# Patient Record
Sex: MALE | Race: BLACK OR AFRICAN AMERICAN | NOT HISPANIC OR LATINO
[De-identification: names, ages, dates, MRNs, and addresses within clinical notes are randomized per-mention and may not be internally consistent; named-entity substitution may affect disease eponyms.]

---

## 2017-04-08 ENCOUNTER — APPOINTMENT (OUTPATIENT)
Dept: MRI IMAGING | Facility: HOSPITAL | Age: 77
End: 2017-04-08

## 2017-04-11 ENCOUNTER — OUTPATIENT (OUTPATIENT)
Dept: OUTPATIENT SERVICES | Facility: HOSPITAL | Age: 77
LOS: 1 days | Discharge: HOME | End: 2017-04-11
Payer: MEDICARE

## 2017-04-11 ENCOUNTER — APPOINTMENT (OUTPATIENT)
Dept: MRI IMAGING | Facility: HOSPITAL | Age: 77
End: 2017-04-11

## 2017-04-11 DIAGNOSIS — H81.319 AURAL VERTIGO, UNSPECIFIED EAR: ICD-10-CM

## 2017-04-11 PROCEDURE — 70551 MRI BRAIN STEM W/O DYE: CPT | Mod: 26

## 2019-08-20 ENCOUNTER — INPATIENT (INPATIENT)
Facility: HOSPITAL | Age: 79
LOS: 1 days | Discharge: HOME | End: 2019-08-22
Attending: INTERNAL MEDICINE | Admitting: INTERNAL MEDICINE
Payer: MEDICARE

## 2019-08-20 VITALS
DIASTOLIC BLOOD PRESSURE: 75 MMHG | WEIGHT: 220.02 LBS | HEIGHT: 68 IN | HEART RATE: 89 BPM | TEMPERATURE: 99 F | RESPIRATION RATE: 18 BRPM | SYSTOLIC BLOOD PRESSURE: 150 MMHG | OXYGEN SATURATION: 96 %

## 2019-08-20 DIAGNOSIS — R42 DIZZINESS AND GIDDINESS: ICD-10-CM

## 2019-08-20 DIAGNOSIS — S22.42XA MULTIPLE FRACTURES OF RIBS, LEFT SIDE, INITIAL ENCOUNTER FOR CLOSED FRACTURE: ICD-10-CM

## 2019-08-20 DIAGNOSIS — E11.9 TYPE 2 DIABETES MELLITUS WITHOUT COMPLICATIONS: ICD-10-CM

## 2019-08-20 DIAGNOSIS — I10 ESSENTIAL (PRIMARY) HYPERTENSION: ICD-10-CM

## 2019-08-20 DIAGNOSIS — F10.10 ALCOHOL ABUSE, UNCOMPLICATED: ICD-10-CM
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
## 2019-08-20 DIAGNOSIS — Z29.9 ENCOUNTER FOR PROPHYLACTIC MEASURES, UNSPECIFIED: ICD-10-CM

## 2019-08-20 DIAGNOSIS — E78.00 PURE HYPERCHOLESTEROLEMIA, UNSPECIFIED: ICD-10-CM

## 2019-08-20 LAB
ALBUMIN SERPL ELPH-MCNC: 3.4 G/DL — SIGNIFICANT CHANGE UP (ref 3.3–5)
ALP SERPL-CCNC: 83 U/L — SIGNIFICANT CHANGE UP (ref 40–120)
ALT FLD-CCNC: 31 U/L — SIGNIFICANT CHANGE UP (ref 12–78)
ANION GAP SERPL CALC-SCNC: 6 MMOL/L — SIGNIFICANT CHANGE UP (ref 5–17)
AST SERPL-CCNC: 31 U/L — SIGNIFICANT CHANGE UP (ref 15–37)
BASOPHILS # BLD AUTO: 0.08 K/UL — SIGNIFICANT CHANGE UP (ref 0–0.2)
BASOPHILS NFR BLD AUTO: 1 % — SIGNIFICANT CHANGE UP (ref 0–2)
BILIRUB SERPL-MCNC: 0.4 MG/DL — SIGNIFICANT CHANGE UP (ref 0.2–1.2)
BUN SERPL-MCNC: 14 MG/DL — SIGNIFICANT CHANGE UP (ref 7–23)
CALCIUM SERPL-MCNC: 9.1 MG/DL — SIGNIFICANT CHANGE UP (ref 8.5–10.1)
CHLORIDE SERPL-SCNC: 103 MMOL/L — SIGNIFICANT CHANGE UP (ref 96–108)
CO2 SERPL-SCNC: 27 MMOL/L — SIGNIFICANT CHANGE UP (ref 22–31)
CREAT SERPL-MCNC: 1.06 MG/DL — SIGNIFICANT CHANGE UP (ref 0.5–1.3)
EOSINOPHIL # BLD AUTO: 0.31 K/UL — SIGNIFICANT CHANGE UP (ref 0–0.5)
EOSINOPHIL NFR BLD AUTO: 3.9 % — SIGNIFICANT CHANGE UP (ref 0–6)
ETHANOL SERPL-MCNC: <10 MG/DL — SIGNIFICANT CHANGE UP (ref 0–10)
GLUCOSE BLDC GLUCOMTR-MCNC: 218 MG/DL — HIGH (ref 70–99)
GLUCOSE SERPL-MCNC: 127 MG/DL — HIGH (ref 70–99)
HCT VFR BLD CALC: 34.4 % — LOW (ref 39–50)
HGB BLD-MCNC: 11.8 G/DL — LOW (ref 13–17)
IMM GRANULOCYTES NFR BLD AUTO: 0.2 % — SIGNIFICANT CHANGE UP (ref 0–1.5)
LYMPHOCYTES # BLD AUTO: 2.79 K/UL — SIGNIFICANT CHANGE UP (ref 1–3.3)
LYMPHOCYTES # BLD AUTO: 34.7 % — SIGNIFICANT CHANGE UP (ref 13–44)
MAGNESIUM SERPL-MCNC: 2.3 MG/DL — SIGNIFICANT CHANGE UP (ref 1.6–2.6)
MCHC RBC-ENTMCNC: 33.9 PG — SIGNIFICANT CHANGE UP (ref 27–34)
MCHC RBC-ENTMCNC: 34.3 GM/DL — SIGNIFICANT CHANGE UP (ref 32–36)
MCV RBC AUTO: 98.9 FL — SIGNIFICANT CHANGE UP (ref 80–100)
MONOCYTES # BLD AUTO: 0.63 K/UL — SIGNIFICANT CHANGE UP (ref 0–0.9)
MONOCYTES NFR BLD AUTO: 7.8 % — SIGNIFICANT CHANGE UP (ref 2–14)
NEUTROPHILS # BLD AUTO: 4.2 K/UL — SIGNIFICANT CHANGE UP (ref 1.8–7.4)
NEUTROPHILS NFR BLD AUTO: 52.4 % — SIGNIFICANT CHANGE UP (ref 43–77)
NRBC # BLD: 0 /100 WBCS — SIGNIFICANT CHANGE UP (ref 0–0)
PLATELET # BLD AUTO: 206 K/UL — SIGNIFICANT CHANGE UP (ref 150–400)
POTASSIUM SERPL-MCNC: 4 MMOL/L — SIGNIFICANT CHANGE UP (ref 3.5–5.3)
POTASSIUM SERPL-SCNC: 4 MMOL/L — SIGNIFICANT CHANGE UP (ref 3.5–5.3)
PROT SERPL-MCNC: 7.9 GM/DL — SIGNIFICANT CHANGE UP (ref 6–8.3)
RBC # BLD: 3.48 M/UL — LOW (ref 4.2–5.8)
RBC # FLD: 12.5 % — SIGNIFICANT CHANGE UP (ref 10.3–14.5)
SODIUM SERPL-SCNC: 136 MMOL/L — SIGNIFICANT CHANGE UP (ref 135–145)
WBC # BLD: 8.03 K/UL — SIGNIFICANT CHANGE UP (ref 3.8–10.5)
WBC # FLD AUTO: 8.03 K/UL — SIGNIFICANT CHANGE UP (ref 3.8–10.5)

## 2019-08-20 PROCEDURE — 99285 EMERGENCY DEPT VISIT HI MDM: CPT

## 2019-08-20 PROCEDURE — 72125 CT NECK SPINE W/O DYE: CPT | Mod: 26

## 2019-08-20 PROCEDURE — 93010 ELECTROCARDIOGRAM REPORT: CPT

## 2019-08-20 PROCEDURE — 76376 3D RENDER W/INTRP POSTPROCES: CPT | Mod: 26

## 2019-08-20 PROCEDURE — 76377 3D RENDER W/INTRP POSTPROCES: CPT | Mod: 26,76

## 2019-08-20 PROCEDURE — 74176 CT ABD & PELVIS W/O CONTRAST: CPT | Mod: 26

## 2019-08-20 PROCEDURE — 70486 CT MAXILLOFACIAL W/O DYE: CPT | Mod: 26

## 2019-08-20 PROCEDURE — 70450 CT HEAD/BRAIN W/O DYE: CPT | Mod: 26

## 2019-08-20 PROCEDURE — 99223 1ST HOSP IP/OBS HIGH 75: CPT | Mod: AI

## 2019-08-20 PROCEDURE — 71250 CT THORAX DX C-: CPT | Mod: 26

## 2019-08-20 RX ORDER — ENOXAPARIN SODIUM 100 MG/ML
40 INJECTION SUBCUTANEOUS DAILY
Refills: 0 | Status: DISCONTINUED | OUTPATIENT
Start: 2019-08-20 | End: 2019-08-22

## 2019-08-20 RX ORDER — LOSARTAN POTASSIUM 100 MG/1
100 TABLET, FILM COATED ORAL DAILY
Refills: 0 | Status: DISCONTINUED | OUTPATIENT
Start: 2019-08-20 | End: 2019-08-22

## 2019-08-20 RX ORDER — DEXTROSE 50 % IN WATER 50 %
15 SYRINGE (ML) INTRAVENOUS ONCE
Refills: 0 | Status: DISCONTINUED | OUTPATIENT
Start: 2019-08-20 | End: 2019-08-22

## 2019-08-20 RX ORDER — OXYCODONE AND ACETAMINOPHEN 5; 325 MG/1; MG/1
2 TABLET ORAL EVERY 6 HOURS
Refills: 0 | Status: DISCONTINUED | OUTPATIENT
Start: 2019-08-20 | End: 2019-08-20

## 2019-08-20 RX ORDER — DEXTROSE 50 % IN WATER 50 %
12.5 SYRINGE (ML) INTRAVENOUS ONCE
Refills: 0 | Status: DISCONTINUED | OUTPATIENT
Start: 2019-08-20 | End: 2019-08-22

## 2019-08-20 RX ORDER — INSULIN LISPRO 100/ML
VIAL (ML) SUBCUTANEOUS AT BEDTIME
Refills: 0 | Status: DISCONTINUED | OUTPATIENT
Start: 2019-08-20 | End: 2019-08-22

## 2019-08-20 RX ORDER — THIAMINE MONONITRATE (VIT B1) 100 MG
100 TABLET ORAL DAILY
Refills: 0 | Status: DISCONTINUED | OUTPATIENT
Start: 2019-08-20 | End: 2019-08-22

## 2019-08-20 RX ORDER — INSULIN LISPRO 100/ML
VIAL (ML) SUBCUTANEOUS
Refills: 0 | Status: DISCONTINUED | OUTPATIENT
Start: 2019-08-20 | End: 2019-08-22

## 2019-08-20 RX ORDER — DEXTROSE 50 % IN WATER 50 %
25 SYRINGE (ML) INTRAVENOUS ONCE
Refills: 0 | Status: DISCONTINUED | OUTPATIENT
Start: 2019-08-20 | End: 2019-08-22

## 2019-08-20 RX ORDER — HYDROCHLOROTHIAZIDE 25 MG
25 TABLET ORAL DAILY
Refills: 0 | Status: DISCONTINUED | OUTPATIENT
Start: 2019-08-20 | End: 2019-08-22

## 2019-08-20 RX ORDER — MECLIZINE HCL 12.5 MG
25 TABLET ORAL EVERY 8 HOURS
Refills: 0 | Status: DISCONTINUED | OUTPATIENT
Start: 2019-08-20 | End: 2019-08-22

## 2019-08-20 RX ORDER — GLUCAGON INJECTION, SOLUTION 0.5 MG/.1ML
1 INJECTION, SOLUTION SUBCUTANEOUS ONCE
Refills: 0 | Status: DISCONTINUED | OUTPATIENT
Start: 2019-08-20 | End: 2019-08-22

## 2019-08-20 RX ORDER — OXYCODONE AND ACETAMINOPHEN 5; 325 MG/1; MG/1
1 TABLET ORAL EVERY 6 HOURS
Refills: 0 | Status: DISCONTINUED | OUTPATIENT
Start: 2019-08-20 | End: 2019-08-22

## 2019-08-20 RX ORDER — FOLIC ACID 0.8 MG
1 TABLET ORAL DAILY
Refills: 0 | Status: DISCONTINUED | OUTPATIENT
Start: 2019-08-20 | End: 2019-08-22

## 2019-08-20 RX ORDER — OXYCODONE AND ACETAMINOPHEN 5; 325 MG/1; MG/1
1 TABLET ORAL ONCE
Refills: 0 | Status: DISCONTINUED | OUTPATIENT
Start: 2019-08-20 | End: 2019-08-20

## 2019-08-20 RX ORDER — AMLODIPINE BESYLATE 2.5 MG/1
5 TABLET ORAL DAILY
Refills: 0 | Status: DISCONTINUED | OUTPATIENT
Start: 2019-08-20 | End: 2019-08-22

## 2019-08-20 RX ORDER — SODIUM CHLORIDE 9 MG/ML
1000 INJECTION, SOLUTION INTRAVENOUS
Refills: 0 | Status: DISCONTINUED | OUTPATIENT
Start: 2019-08-20 | End: 2019-08-22

## 2019-08-20 RX ORDER — ATORVASTATIN CALCIUM 80 MG/1
10 TABLET, FILM COATED ORAL AT BEDTIME
Refills: 0 | Status: DISCONTINUED | OUTPATIENT
Start: 2019-08-20 | End: 2019-08-22

## 2019-08-20 RX ORDER — ACETAMINOPHEN 500 MG
650 TABLET ORAL EVERY 6 HOURS
Refills: 0 | Status: DISCONTINUED | OUTPATIENT
Start: 2019-08-20 | End: 2019-08-22

## 2019-08-20 RX ADMIN — OXYCODONE AND ACETAMINOPHEN 1 TABLET(S): 5; 325 TABLET ORAL at 15:34

## 2019-08-20 RX ADMIN — ATORVASTATIN CALCIUM 10 MILLIGRAM(S): 80 TABLET, FILM COATED ORAL at 22:04

## 2019-08-20 RX ADMIN — OXYCODONE AND ACETAMINOPHEN 1 TABLET(S): 5; 325 TABLET ORAL at 16:48

## 2019-08-20 RX ADMIN — Medication 25 MILLIGRAM(S): at 22:05

## 2019-08-20 NOTE — ED ADULT NURSE NOTE - NSIMPLEMENTINTERV_GEN_ALL_ED
Implemented All Fall Risk Interventions:  [place] to call system. Call bell, personal items and telephone within reach. Instruct patient to call for assistance. Room bathroom lighting operational. Non-slip footwear when patient is off stretcher. Physically safe environment: no spills, clutter or unnecessary equipment. Stretcher in lowest position, wheels locked, appropriate side rails in place. Provide visual cue, wrist band, yellow gown, etc. Monitor gait and stability. Monitor for mental status changes and reorient to person, place, and time. Review medications for side effects contributing to fall risk. Reinforce activity limits and safety measures with patient and family.

## 2019-08-20 NOTE — H&P ADULT - NSHPLABSRESULTS_GEN_ALL_CORE
11.8   8.03  )-----------( 206      ( 20 Aug 2019 17:09 )             34.4     08-20    136  |  103  |  14  ----------------------------<  127<H>  4.0   |  27  |  1.06    Ca    9.1      20 Aug 2019 17:09  Mg     2.3     08-20    TPro  7.9  /  Alb  3.4  /  TBili  0.4  /  DBili  x   /  AST  31  /  ALT  31  /  AlkPhos  83  08-20    CT Maxillofacial No Cont (08.20.19 @ 15:45)  IMPRESSION:  Facial bones intact without fracture.       CT Cervical Spine No Cont (08.20.19 @ 15:30)  IMPRESSION:  Degenerative changes without evidence of fracture    CT Abdomen and Pelvis No Cont (08.20.19 @ 15:29)  IMPRESSION:   Acute, minimally displaced fracture involving the first right   costosternal joint, extending into the lateral aspect of the manubrium.   There is also a nondisplaced fracture of the right anterior second rib.  There is a 3.5 cm hypodense lesion in the left kidney which cannot be   characterized as a simple cyst. Correlation with nonemergent renal   ultrasound is recommended.

## 2019-08-20 NOTE — ED ADULT NURSE NOTE - HOW OFTEN DO YOU HAVE SIX OR MORE DRINKS ON ONE OCCASION?
"Chief Complaint   Patient presents with     Breast Problem       Initial BP 96/58 (BP Location: Left arm, Patient Position: Sitting, Cuff Size: Adult Regular)   Pulse 68   Temp 97.6  F (36.4  C) (Tympanic)   Resp 20   Ht 1.6 m (5' 3\")   Wt 75.1 kg (165 lb 9.6 oz)   SpO2 96%   BMI 29.33 kg/m   Estimated body mass index is 29.33 kg/m  as calculated from the following:    Height as of this encounter: 1.6 m (5' 3\").    Weight as of this encounter: 75.1 kg (165 lb 9.6 oz).  Medication Reconciliation: complete    Zuleika Rossi LPN  "
Less than Monthly

## 2019-08-20 NOTE — ED ADULT TRIAGE NOTE - ESI TRIAGE ACUITY LEVEL, MLM
Last 5 Patient Entered Readings                                      Current 30 Day Average: 137/76     Recent Readings 5/20/2019 5/20/2019 5/13/2019 5/13/2019 5/12/2019    SBP (mmHg) 137 137 139 139 133    DBP (mmHg) 74 74 75 75 71    Pulse 79 79 79 79 81        Digital Medicine: Health  Follow Up    Lifestyle Modifications:    Dietary Modifications (Sodium intake <2,000mg/day, food labels, dining out):   Patient incorporates plenty of fresh fruits and vegetables into her diet and seldom eats fast foods.  Patient states that she probably eats too much sodium.  Reviewed AHA recommendations of low sodium diet and the range for what a low-sodium option is.    Physical Activity:   Patient states that she has a gym membership to [place] but with the lenten season and running her [employer]'s weekly fish koch it has limited her ability time wise to attend the gym regularly but expresses intentions to make it a part of her routine. I suggested that until she can get back on track there that we can set a goal for her to start walking 2-3x per week for at least 10 minutes at a time.   Patient agreed to walking goal.     Medication Therapy:   Patient has been compliant with the medication regimen.    Patient has the following medication side effects/concerns: None  (Frequency/Alleviating factors/Precipitating factors, etc.)     Follow up with Mrs. Parisa EARL [place] completed. No further questions or concerns. Will continue to follow up to achieve health goals.    Notes:  Patient agreed to goal of walking 2-3x per week for at least 10 minutes at a time.  Will f/u on exercise goal, diet (water intake) at next outreach.      
2

## 2019-08-20 NOTE — ED ADULT NURSE NOTE - OBJECTIVE STATEMENT
received er bed 16 c/o r orbital ecchymosis with pain at site c/o b/l clavicular pain and b/l rib area pain s/p missed a step on saturday and fell while outside in yard denies loc at time of incident hx dizziness x 2 years seen here in past and at ent no abnormal findings per pt denies anticoagulation

## 2019-08-20 NOTE — ED PROVIDER NOTE - CLINICAL SUMMARY MEDICAL DECISION MAKING FREE TEXT BOX
pt pw fall, sustained rib fractures. will admit for pain control for alcohol withdrawal as patient's wife believes him to be a daily etoh drinker.

## 2019-08-20 NOTE — ED PROVIDER NOTE - PHYSICAL EXAMINATION
Gen: Alert, NAD  Head: NC, AT   Eyes: PERRL, EOMI, normal lids/conjunctiva  ENT: normal hearing, patent oropharynx without erythema/exudate, uvula midline  Neck: supple, no tenderness, Trachea midline  Pulm: Bilateral BS, normal resp effort, no wheeze/stridor/retractions  CV: RRR, no M/R/G, 2+ radial and dp pulses bl, no edema  Abd: morbidly obese. soft, NT/ND, +BS, no hepatosplenomegaly  Mskel: extremities x4 with normal ROM and no joint effusions. no ctl spine ttp.   Skin: no rash, no bruising   Neuro: AAOx3, no sensory/motor deficits, CN 2-12 intact

## 2019-08-20 NOTE — H&P ADULT - NSHPPHYSICALEXAM_GEN_ALL_CORE
Physical Exam:   GENERAL: NAD, well-groomed, well-developed  HEAD:  Atraumatic, Normocephalic  EYES: +ecchymotic eye to left eye. EOMI, PERRLA, conjunctiva and sclera clear  ENMT: No tonsillar erythema, exudates, or enlargement; Moist mucous membranes, No lesions  NECK: Supple, No JVD  NERVOUS SYSTEM:  Alert & Oriented X3, Good concentration; Motor Strength 5/5 B/L upper and lower extremities, No arm/leg drift, good finger grasp  CHEST/LUNG: +TTP right 1st and 2nd rib. Clear to percussion bilaterally; No rales, rhonchi, wheezing, or rubs  HEART: Regular rate and rhythm; No murmurs  ABDOMEN: Soft, Nontender, Nondistended; Bowel sounds present  EXTREMITIES:  2+ Peripheral Pulses, No clubbing, cyanosis, or edema  LYMPH: No lymphadenopathy noted  SKIN: No rashes or lesions

## 2019-08-20 NOTE — PATIENT PROFILE ADULT - NSPROHMDIABETBLDGLCUSUAL_GEN_A_NUR
Nutrition Care Plan    Nutrition Diagnosis:   Overweight/obesity  related to poor food choices as evidenced by pt report of \"I'd really like to start eating better,\" BMI 49.31 kg/m^2  Altered nutrition-related laboratory values  related to diabetes milltus, type 2, uncontrolled as evidenced by fasting glucose 272-295 mg/dL, exogenous insulin dependency.     Intervention:  Modified diet:    Regular diet discontinued. RD ordered consistent carb liberal to manage blood glucose levels. Pt reports not following any diet at home however, is agreeable to beginning a path towards wellness and learning about carbohydrate counting.  RD assisted with menus x3.   Purpose of the nutrition education:   Discussed and provided [facility] handout \"Carbohydrates and Effects on blood glucose,\" stressing the importance of consistency in intake results in a consistent blood glucose level; 15 g of CHO = 1 exchange and how this translates to [facility] menu items, and specific foods that do and do not have impact on blood glucose levels. Pt verbalized inadequate knowledge before education and basic knowledge after education.      Monitoring and Evaluation:  Amount of food:   Goal - pt to consume >75% of 3 meals in accordance with diet ordered.. Will monitor intakes.    Glucose, fasting:    Glucose, casual:    Goal - fasting glucose <140, causal glucose <180 mg/dL      known

## 2019-08-20 NOTE — ED ADULT NURSE NOTE - CHIEF COMPLAINT QUOTE
as per pt " On Saturday afternoon I was walking down the stairs when I fell to the ground. I remember having couple alcoholic drinks but no recollection falling, and for the past couple days I have been dizzy, bruise to the right side of my face, hand, left shoulder and abdomen." hx htn, vertigo. dm, hdl.

## 2019-08-20 NOTE — ED ADULT TRIAGE NOTE - CHIEF COMPLAINT QUOTE
as per pt " On Saturday afternoon I was walking down the stairs when I fell to the ground. I have no recollection falling, and for the past couple days I have been dizzy, bruise to the right side of my face, hand, left shoulder and abdomen." hx htn, vertigo. dm, hdl. as per pt " On Saturday afternoon I was walking down the stairs when I fell to the ground. I remember having couple alcoholic drinks but no recollection falling, and for the past couple days I have been dizzy, bruise to the right side of my face, hand, left shoulder and abdomen." hx htn, vertigo. dm, hdl.

## 2019-08-20 NOTE — H&P ADULT - PROBLEM SELECTOR PLAN 2
closed fractures, first rib minimally displaced, 2nd rib nondisplaced.   -tylenol and percocet for pain control

## 2019-08-20 NOTE — H&P ADULT - HISTORY OF PRESENT ILLNESS
Patient is a 79 y/o male with PMHx DM, HTN, HLD, etoh abuse complaining of generalized pain 2/2 to fall on saturday. Patient admits to feeling dizzy for 2 years and has been taking meclizine. Patient states he had MRI, which was negative for acute findings. Patient take meclizine for dizziness, which has been helping at night. Patient admits to drinking prior to fall and states "drinking makes me dizzier". Patient believe fall was mechanical but cannot fully recall fall. +head trauma. +rib pain and abdominal pain. Denies N/V/D, fever, chills, chest pain, palpitations, sob, urinary/bowel changes.     In Er, labs unremarkable. CT head negative for acute changes/hemorrhage, CT chest and AP showing ribs fractures, renal cyst.     ER attending discussed patient with Dr. Christianson, who accepted patient for admission.

## 2019-08-20 NOTE — ED PROVIDER NOTE - OBJECTIVE STATEMENT
Pertinent PMH/PSH/FHx/SHx and Review of Systems contained within:  78m hx htn, dm pw and etoh abuser pw fall 3 days ago. patient notes that he was in the yard having some drinks when he tripped and fell onto his face and left side. he notes no loc but cannot recall parts of the fall. he has left abd and shoulder pain. he has no ha, vision loss, epistaxis, hematuria, cp, sob, numbness. nothing taken for symptoms.   Fh and Sh not otherwise contributory  ROS otherwise negative

## 2019-08-20 NOTE — SBIRT NOTE ADULT - NSSBIRTBRIEFINTDET_GEN_A_CORE
met with pt at bedside at reviewed AUDIT score. Pt was receptive to information and said that he wants to cut back after this fall; adding " I am scared". Pt commented that he abruptly quit cigarettes and believes that he can reduce this , too.

## 2019-08-20 NOTE — H&P ADULT - NSHPREVIEWOFSYSTEMS_GEN_ALL_CORE
REVIEW OF SYSTEMS:  CONSTITUTIONAL: No fever, weight loss, or fatigue  EYES: No eye pain, visual disturbances, or discharge  ENMT:  No difficulty hearing, tinnitus, vertigo; No sinus or throat pain  NECK: No pain or stiffness  BREASTS: No pain, masses, or nipple discharge  RESPIRATORY: No cough, wheezing, chills or hemoptysis; No shortness of breath  CARDIOVASCULAR: No chest pain, palpitations, dizziness, or leg swelling  GASTROINTESTINAL: +abdominal pain. No epigastric pain. No nausea, vomiting, or hematemesis; No diarrhea r constipation. No melena or hematochezia.  GENITOURINARY: No dysuria, No frequency, No hematuria, No incontinence  NEUROLOGICAL: No headaches, memory loss, loss of strength, numbness, or tremors  SKIN: No itching, burning, rashes, or lesions   LYMPH NODES: No enlarged glands  ENDOCRINE: No heat or cold intolerance; No hair loss  MUSCULOSKELETAL: +rib pain. No swelling; No muscle, back, or extremity pain  PSYCHIATRIC: No depression, anxiety, mood swings, or difficulty sleeping  HEME/LYMPH: No easy bruising, or bleeding gums  ALLERGY AND IMMUNOLOGIC: No hives or eczema

## 2019-08-20 NOTE — H&P ADULT - PROBLEM SELECTOR PLAN 4
CIWA protocol in placed, add taper if patient goes into withdrawal  -thiamine, folic acid, multivitamin

## 2019-08-21 DIAGNOSIS — N28.1 CYST OF KIDNEY, ACQUIRED: ICD-10-CM

## 2019-08-21 LAB
GLUCOSE BLDC GLUCOMTR-MCNC: 135 MG/DL — HIGH (ref 70–99)
GLUCOSE BLDC GLUCOMTR-MCNC: 159 MG/DL — HIGH (ref 70–99)
GLUCOSE BLDC GLUCOMTR-MCNC: 172 MG/DL — HIGH (ref 70–99)
GLUCOSE BLDC GLUCOMTR-MCNC: 198 MG/DL — HIGH (ref 70–99)
HBA1C BLD-MCNC: 7.3 % — HIGH (ref 4–5.6)

## 2019-08-21 PROCEDURE — 99233 SBSQ HOSP IP/OBS HIGH 50: CPT

## 2019-08-21 RX ADMIN — Medication 25 MILLIGRAM(S): at 05:46

## 2019-08-21 RX ADMIN — ENOXAPARIN SODIUM 40 MILLIGRAM(S): 100 INJECTION SUBCUTANEOUS at 11:23

## 2019-08-21 RX ADMIN — Medication 1 TABLET(S): at 11:22

## 2019-08-21 RX ADMIN — Medication 1 MILLIGRAM(S): at 11:22

## 2019-08-21 RX ADMIN — Medication 100 MILLIGRAM(S): at 11:22

## 2019-08-21 RX ADMIN — ATORVASTATIN CALCIUM 10 MILLIGRAM(S): 80 TABLET, FILM COATED ORAL at 21:53

## 2019-08-21 RX ADMIN — OXYCODONE AND ACETAMINOPHEN 1 TABLET(S): 5; 325 TABLET ORAL at 15:29

## 2019-08-21 RX ADMIN — AMLODIPINE BESYLATE 5 MILLIGRAM(S): 2.5 TABLET ORAL at 05:46

## 2019-08-21 RX ADMIN — LOSARTAN POTASSIUM 100 MILLIGRAM(S): 100 TABLET, FILM COATED ORAL at 05:45

## 2019-08-21 RX ADMIN — Medication 25 MILLIGRAM(S): at 15:26

## 2019-08-21 RX ADMIN — OXYCODONE AND ACETAMINOPHEN 1 TABLET(S): 5; 325 TABLET ORAL at 16:00

## 2019-08-21 RX ADMIN — Medication 25 MILLIGRAM(S): at 21:53

## 2019-08-21 NOTE — PROGRESS NOTE ADULT - SUBJECTIVE AND OBJECTIVE BOX
Patient is a 78y old  Male who presents with a chief complaint of fall, rib fractures (20 Aug 2019 19:30), has no SOB, complains rib pain.       OVERNIGHT EVENTS: none    MEDICATIONS  (STANDING):  amLODIPine   Tablet 5 milliGRAM(s) Oral daily  atorvastatin 10 milliGRAM(s) Oral at bedtime  dextrose 5%. 1000 milliLiter(s) (50 mL/Hr) IV Continuous <Continuous>  dextrose 50% Injectable 12.5 Gram(s) IV Push once  dextrose 50% Injectable 25 Gram(s) IV Push once  dextrose 50% Injectable 25 Gram(s) IV Push once  enoxaparin Injectable 40 milliGRAM(s) SubCutaneous daily  folic acid 1 milliGRAM(s) Oral daily  hydrochlorothiazide 25 milliGRAM(s) Oral daily  insulin lispro (HumaLOG) corrective regimen sliding scale   SubCutaneous three times a day before meals  insulin lispro (HumaLOG) corrective regimen sliding scale   SubCutaneous at bedtime  losartan 100 milliGRAM(s) Oral daily  meclizine 25 milliGRAM(s) Oral every 8 hours  multivitamin 1 Tablet(s) Oral daily  thiamine 100 milliGRAM(s) Oral daily    MEDICATIONS  (PRN):  acetaminophen   Tablet .. 650 milliGRAM(s) Oral every 6 hours PRN Mild Pain (1 - 3)  dextrose 40% Gel 15 Gram(s) Oral once PRN Blood Glucose LESS THAN 70 milliGRAM(s)/deciliter  glucagon  Injectable 1 milliGRAM(s) IntraMuscular once PRN Glucose LESS THAN 70 milligrams/deciliter  oxyCODONE    5 mG/acetaminophen 325 mG 1 Tablet(s) Oral every 6 hours PRN Moderate Pain (4 - 6)        REVIEW OF SYSTEMS:  CONSTITUTIONAL: No fever, weight loss, or fatigue  EYES: No eye pain, visual disturbances, or discharge  ENMT:  No difficulty hearing, tinnitus, vertigo; No sinus or throat pain  NECK: No pain or stiffness  RESPIRATORY: No cough, wheezing, chills or hemoptysis; No shortness of breath  CARDIOVASCULAR: No chest pain, palpitations, dizziness, or leg swelling  GASTROINTESTINAL: No abdominal or epigastric pain. No nausea, vomiting, or hematemesis;    GENITOURINARY: No dysuria, frequency, hematuria, or incontinence  NEUROLOGICAL: No headaches, memory loss, loss of strength, numbness, or tremors  SKIN: No itching, burning, rashes, or lesions      Vital Signs Last 24 Hrs  T(C): 36.9 (21 Aug 2019 12:01), Max: 37.1 (20 Aug 2019 20:20)  T(F): 98.5 (21 Aug 2019 12:01), Max: 98.8 (20 Aug 2019 20:20)  HR: 76 (21 Aug 2019 12:01) (64 - 76)  BP: 143/59 (21 Aug 2019 12:01) (119/71 - 155/62)  BP(mean): --  RR: 17 (21 Aug 2019 12:01) (17 - 18)  SpO2: 97% (21 Aug 2019 12:01) (95% - 99%)    PHYSICAL EXAM:  GENERAL: NAD, well-groomed, well-developed  HEAD:  Atraumatic, Normocephalic  EYES: EOMI, PERRLA, conjunctiva and sclera clear  ENMT: No tonsillar erythema, exudates, or enlargement; Moist mucous membranes   NECK: Supple, No JVD   NERVOUS SYSTEM:  Alert & Oriented X3, Good concentration; Motor Strength 5/5 B/L upper and lower extremities; DTRs 2+ intact and symmetric  CHEST/LUNG: Clear to auscultation bilaterally; No rales, rhonchi, wheezing, or rubs, some tenderness to bilateral lateral chest to palpation, or percussion.  HEART: Regular rate and rhythm; No murmurs, rubs, or gallops  ABDOMEN: Soft, Nontender, Nondistended; Bowel sounds present  EXTREMITIES:  2+ Peripheral Pulses, No clubbing, cyanosis, or edema  LYMPH: No lymphadenopathy noted  SKIN: No rashes or lesions    LABS:                        11.8   8.03  )-----------( 206      ( 20 Aug 2019 17:09 )             34.4     08-20    136  |  103  |  14  ----------------------------<  127<H>  4.0   |  27  |  1.06    Ca    9.1      20 Aug 2019 17:09  Mg     2.3     08-20    TPro  7.9  /  Alb  3.4  /  TBili  0.4  /  DBili  x   /  AST  31  /  ALT  31  /  AlkPhos  83  08-20       cardiac markers     CAPILLARY BLOOD GLUCOSE      POCT Blood Glucose.: 198 mg/dL (21 Aug 2019 11:07)  POCT Blood Glucose.: 159 mg/dL (21 Aug 2019 07:49)  POCT Blood Glucose.: 218 mg/dL (20 Aug 2019 22:03)    Cultures    RADIOLOGY & ADDITIONAL TESTS:    Imaging Personally Reviewed:  [ ] YES  [ ] NO    Consultant(s) Notes Reviewed:  [ ] YES  [ ] NO    Care Discussed with Consultants/Other Providers [ ] YES  [ ] NO

## 2019-08-22 ENCOUNTER — TRANSCRIPTION ENCOUNTER (OUTPATIENT)
Age: 79
End: 2019-08-22

## 2019-08-22 VITALS
SYSTOLIC BLOOD PRESSURE: 109 MMHG | OXYGEN SATURATION: 97 % | TEMPERATURE: 98 F | HEART RATE: 73 BPM | RESPIRATION RATE: 18 BRPM | DIASTOLIC BLOOD PRESSURE: 59 MMHG

## 2019-08-22 LAB
APPEARANCE UR: CLEAR — SIGNIFICANT CHANGE UP
BILIRUB UR-MCNC: NEGATIVE — SIGNIFICANT CHANGE UP
COLOR SPEC: YELLOW — SIGNIFICANT CHANGE UP
DIFF PNL FLD: NEGATIVE — SIGNIFICANT CHANGE UP
GLUCOSE BLDC GLUCOMTR-MCNC: 148 MG/DL — HIGH (ref 70–99)
GLUCOSE BLDC GLUCOMTR-MCNC: 156 MG/DL — HIGH (ref 70–99)
GLUCOSE BLDC GLUCOMTR-MCNC: 157 MG/DL — HIGH (ref 70–99)
GLUCOSE UR QL: NEGATIVE MG/DL — SIGNIFICANT CHANGE UP
KETONES UR-MCNC: NEGATIVE — SIGNIFICANT CHANGE UP
LEUKOCYTE ESTERASE UR-ACNC: NEGATIVE — SIGNIFICANT CHANGE UP
NITRITE UR-MCNC: NEGATIVE — SIGNIFICANT CHANGE UP
PH UR: 6 — SIGNIFICANT CHANGE UP (ref 5–8)
PROT UR-MCNC: NEGATIVE MG/DL — SIGNIFICANT CHANGE UP
SP GR SPEC: 1.01 — SIGNIFICANT CHANGE UP (ref 1.01–1.02)
UROBILINOGEN FLD QL: NEGATIVE MG/DL — SIGNIFICANT CHANGE UP

## 2019-08-22 PROCEDURE — 99239 HOSP IP/OBS DSCHRG MGMT >30: CPT

## 2019-08-22 RX ORDER — ATORVASTATIN CALCIUM 80 MG/1
1 TABLET, FILM COATED ORAL
Qty: 0 | Refills: 0 | DISCHARGE

## 2019-08-22 RX ORDER — FOLIC ACID 0.8 MG
1 TABLET ORAL
Qty: 30 | Refills: 0
Start: 2019-08-22 | End: 2019-09-20

## 2019-08-22 RX ORDER — AMLODIPINE BESYLATE 2.5 MG/1
1 TABLET ORAL
Qty: 0 | Refills: 0 | DISCHARGE

## 2019-08-22 RX ORDER — ATORVASTATIN CALCIUM 80 MG/1
1 TABLET, FILM COATED ORAL
Qty: 0 | Refills: 0 | DISCHARGE
Start: 2019-08-22

## 2019-08-22 RX ORDER — THIAMINE MONONITRATE (VIT B1) 100 MG
1 TABLET ORAL
Qty: 30 | Refills: 0
Start: 2019-08-22 | End: 2019-09-20

## 2019-08-22 RX ORDER — AMLODIPINE BESYLATE 2.5 MG/1
1 TABLET ORAL
Qty: 0 | Refills: 0 | DISCHARGE
Start: 2019-08-22

## 2019-08-22 RX ADMIN — LOSARTAN POTASSIUM 100 MILLIGRAM(S): 100 TABLET, FILM COATED ORAL at 06:13

## 2019-08-22 RX ADMIN — AMLODIPINE BESYLATE 5 MILLIGRAM(S): 2.5 TABLET ORAL at 06:13

## 2019-08-22 RX ADMIN — Medication 1 MILLIGRAM(S): at 11:26

## 2019-08-22 RX ADMIN — Medication 100 MILLIGRAM(S): at 11:25

## 2019-08-22 RX ADMIN — Medication 25 MILLIGRAM(S): at 06:13

## 2019-08-22 RX ADMIN — ENOXAPARIN SODIUM 40 MILLIGRAM(S): 100 INJECTION SUBCUTANEOUS at 11:25

## 2019-08-22 RX ADMIN — OXYCODONE AND ACETAMINOPHEN 1 TABLET(S): 5; 325 TABLET ORAL at 12:26

## 2019-08-22 RX ADMIN — Medication 1 TABLET(S): at 11:25

## 2019-08-22 RX ADMIN — OXYCODONE AND ACETAMINOPHEN 1 TABLET(S): 5; 325 TABLET ORAL at 11:26

## 2019-08-22 NOTE — PROGRESS NOTE ADULT - PROBLEM SELECTOR PLAN 4
-CIWA protocol in placed, add taper if patient goes into withdrawal  -thiamine, folic acid, multivitamin
-CIWA protocol in placed, add taper if patient goes into withdrawal  -thiamine, folic acid, multivitamin

## 2019-08-22 NOTE — PROGRESS NOTE ADULT - PROBLEM SELECTOR PLAN 2
-closed fractures, first rib minimally displaced, 2nd rib nondisplaced.   -pain meds as needed
-closed fractures, first rib minimally displaced, 2nd rib nondisplaced.   -pain meds as needed

## 2019-08-22 NOTE — DISCHARGE NOTE PROVIDER - HOSPITAL COURSE
77 y/o male with PMHx DM, HTN, HLD, etoh abuse, now admitted for fall (presumed mechanical fall 2/2 to alcoholic intake) , rib fractures              Problem/Plan - 1:    ·  Problem: Fall, initial encounter.  Plan: -Mechanical fall 2/2 to alcohol intake, fall precautions.          Problem/Plan - 2:    ·  Problem: Closed fracture of multiple ribs of left side, initial encounter.  Plan: -closed fractures, first rib minimally displaced, 2nd rib nondisplaced.     -pain meds as needed. Percocet sent to pharmacy.          Problem/Plan - 3:    ·  Problem: Vertigo.  Plan: - MRI in April 2017 negative for acute pathology    - continue meclizine 25mg q8hrs PRN    - PT eval noted, no need.          Problem/Plan - 4:    ·  Problem: Alcohol abuse.  Plan: -CIWA protocol in placed, add taper if patient goes into withdrawal    -thiamine, folic acid, multivitamin.          Problem/Plan - 5:    ·  Problem: High cholesterol.  Plan: continue atorvastatin.          Problem/Plan - 6:    Problem: Type 2 diabetes mellitus without complication, without long-term current use of insulin. Plan: AIC: 7.3. Dc with home mediations         Problem/Plan - 7:    ·  Problem: Essential hypertension.  Plan: - on Norvasc, Losartan, HCTZ.          Problem/Plan - 8:    ·  Problem: Renal cyst, left.  Plan: - cyst is 3.5 cm, needs renal sonogram as outpatient.     -outpatient renal u/s script given 79 y/o male with PMHx DM, HTN, HLD, etoh abuse, now admitted for fall (presumed mechanical fall 2/2 to alcoholic intake) , right second rib fracture              Problem/Plan - 1:    ·  Problem: Fall, initial encounter.  Plan: -Mechanical fall 2/2 to alcohol intake, fall precautions.          Problem/Plan - 2:    ·  Problem: Closed fracture of multiple ribs on the right side, initial encounter.  Plan: -closed fractures 2nd rib nondisplaced.     -pain meds as needed. Percocet sent to pharmacy.          Problem/Plan - 3:    ·  Problem: Vertigo.  Plan: - MRI in April 2017 negative for acute pathology    - continue meclizine 25mg q8hrs PRN    - PT eval noted, no need.          Problem/Plan - 4:    ·  Problem: Alcohol abuse.  Plan: -CIWA protocol in placed, add taper if patient goes into withdrawal    -thiamine, folic acid, multivitamin.          Problem/Plan - 5:    ·  Problem: High cholesterol.  Plan: continue atorvastatin.          Problem/Plan - 6:    Problem: Type 2 diabetes mellitus without complication, without long-term current use of insulin. Plan: AIC: 7.3. Dc with home mediations        Problem: Essential hypertension.  Plan: - on Norvasc, Losartan, HCTZ.          Problem/Plan - 8:    ·  Problem: Renal cyst, left.  Plan: - cyst is 3.5 cm, needs renal sonogram as outpatient. Script for sonogram given.         Note: Patient had acute right second rib fracture.

## 2019-08-22 NOTE — PROGRESS NOTE ADULT - ASSESSMENT
77 y/o male with PMHx DM, HTN, HLD, etoh abuse, now admitted for fall (presumed mechanical fall 2/2 to alcoholic intake) , rib fractures    IMPROVE VTE Individual Risk Assessment          RISK                                                          Points    [  ] Previous VTE                                                3    [  ] Thrombophilia                                             2    [  ] Lower limb paralysis                                   2        (unable to hold up >15 seconds)      [  ] Current Cancer                                             2         (within 6 months)    [  ] Immobilization > 24 hrs                              1    [  ] ICU/CCU stay > 24 hours                            1    [ x ] Age > 60                                                        1    IMPROVE VTE Score _____1____
77 y/o male with PMHx DM, HTN, HLD, etoh abuse, now admitted for fall (presumed mechanical fall 2/2 to alcoholic intake) , rib fractures    IMPROVE VTE Individual Risk Assessment          RISK                                                          Points    [  ] Previous VTE                                                3    [  ] Thrombophilia                                             2    [  ] Lower limb paralysis                                   2        (unable to hold up >15 seconds)      [  ] Current Cancer                                             2         (within 6 months)    [  ] Immobilization > 24 hrs                              1    [  ] ICU/CCU stay > 24 hours                            1    [ x ] Age > 60                                                        1    IMPROVE VTE Score _____1____

## 2019-08-22 NOTE — PROGRESS NOTE ADULT - PROBLEM SELECTOR PLAN 1
-Mechanical fall 2/2 to alcohol intake  -fall precautions
-Mechanical fall 2/2 to alcohol intake, fall precautions

## 2019-08-22 NOTE — DISCHARGE NOTE NURSING/CASE MANAGEMENT/SOCIAL WORK - NSDCDPATPORTLINK_GEN_ALL_CORE
You can access the [facility] Portal, offered by [facility], by registering with the following website: [email or web address]

## 2019-08-22 NOTE — PROGRESS NOTE ADULT - PROBLEM SELECTOR PLAN 3
- MRI in April 2017 negative for acute pathology  - continue meclizine 25mg q8hrs  - PT eval noted, no need.
- MRI in April 2017 negative for acute pathology  - continue meclizine 25mg q8hrs  - PT eval noted, no need.

## 2019-08-22 NOTE — DISCHARGE NOTE PROVIDER - NSDCFUSCHEDAPPT_GEN_ALL_CORE_FT
No VICENTE DAHL ; 08/29/2019 ; [facility]PP Ultrasnd  [address] Spine appears normal, range of motion is not limited, no muscle or joint tenderness

## 2019-08-22 NOTE — DISCHARGE NOTE PROVIDER - NSDCCPCAREPLAN_GEN_ALL_CORE_FT
PRINCIPAL DISCHARGE DIAGNOSIS  Diagnosis: Closed fracture of multiple ribs of left side, initial encounter  Assessment and Plan of Treatment: Physical therapy seen, no needs required. May discharge home. Pain management. Take percocet every 6 hours as needed for severe pain. Follow up with PCP within 2 weeks of discharge date. fall precautions.      SECONDARY DISCHARGE DIAGNOSES  Diagnosis: Renal cyst, left  Assessment and Plan of Treatment: script given to have bilateral alex ultrasound.    Diagnosis: Essential hypertension  Assessment and Plan of Treatment: continue home bp medications    Diagnosis: Alcohol abuse  Assessment and Plan of Treatment: encourage sobriety. thaimine, folic acid, multivitamin sent to pharmacy    Diagnosis: Vertigo  Assessment and Plan of Treatment: continue meclizine 25mg every 8 hours as needed for dizziness. STOP DRINKING BECAUSE IT MAKES YOU DIZZIER.    Diagnosis: High cholesterol  Assessment and Plan of Treatment: continue home cholesterol medication

## 2019-08-22 NOTE — DISCHARGE NOTE NURSING/CASE MANAGEMENT/SOCIAL WORK - NSDCPEWEB_GEN_ALL_CORE
[place] website --- [email or web address] Tobacco [email or web address] website --- [email or web address]

## 2019-08-22 NOTE — PROGRESS NOTE ADULT - SUBJECTIVE AND OBJECTIVE BOX
INTERVAL HPI/OVERNIGHT EVENTS:  Pt seen and examined at bedside.     Allergies/Intolerance: No Known Allergies      MEDICATIONS  (STANDING):  amLODIPine   Tablet 5 milliGRAM(s) Oral daily  atorvastatin 10 milliGRAM(s) Oral at bedtime  dextrose 5%. 1000 milliLiter(s) (50 mL/Hr) IV Continuous <Continuous>  dextrose 50% Injectable 12.5 Gram(s) IV Push once  dextrose 50% Injectable 25 Gram(s) IV Push once  dextrose 50% Injectable 25 Gram(s) IV Push once  enoxaparin Injectable 40 milliGRAM(s) SubCutaneous daily  folic acid 1 milliGRAM(s) Oral daily  hydrochlorothiazide 25 milliGRAM(s) Oral daily  insulin lispro (HumaLOG) corrective regimen sliding scale   SubCutaneous three times a day before meals  insulin lispro (HumaLOG) corrective regimen sliding scale   SubCutaneous at bedtime  losartan 100 milliGRAM(s) Oral daily  meclizine 25 milliGRAM(s) Oral every 8 hours  multivitamin 1 Tablet(s) Oral daily  thiamine 100 milliGRAM(s) Oral daily    MEDICATIONS  (PRN):  acetaminophen   Tablet .. 650 milliGRAM(s) Oral every 6 hours PRN Mild Pain (1 - 3)  dextrose 40% Gel 15 Gram(s) Oral once PRN Blood Glucose LESS THAN 70 milliGRAM(s)/deciliter  glucagon  Injectable 1 milliGRAM(s) IntraMuscular once PRN Glucose LESS THAN 70 milligrams/deciliter  oxyCODONE    5 mG/acetaminophen 325 mG 1 Tablet(s) Oral every 6 hours PRN Moderate Pain (4 - 6)        ROS: all systems reviewed and wnl      PHYSICAL EXAMINATION:  Vital Signs Last 24 Hrs  T(C): 36.5 (22 Aug 2019 05:54), Max: 36.9 (21 Aug 2019 12:01)  T(F): 97.7 (22 Aug 2019 05:54), Max: 98.5 (21 Aug 2019 12:01)  HR: 69 (22 Aug 2019 06:09) (63 - 77)  BP: 136/67 (22 Aug 2019 06:09) (119/63 - 143/59)  BP(mean): --  RR: 18 (22 Aug 2019 05:54) (17 - 18)  SpO2: 97% (22 Aug 2019 05:54) (95% - 98%)  CAPILLARY BLOOD GLUCOSE      POCT Blood Glucose.: 148 mg/dL (22 Aug 2019 07:53)  POCT Blood Glucose.: 172 mg/dL (21 Aug 2019 21:52)  POCT Blood Glucose.: 135 mg/dL (21 Aug 2019 15:54)  POCT Blood Glucose.: 198 mg/dL (21 Aug 2019 11:07)      08-22 @ 07:01  -  08-22 @ 10:51  --------------------------------------------------------  IN: 240 mL / OUT: 0 mL / NET: 240 mL        GENERAL:   NECK: supple, No JVD  CHEST/LUNG: clear to auscultation bilaterally; no rales, rhonchi, or wheezing b/l  HEART: normal S1, S2  ABDOMEN: BS+, soft, ND, NT   EXTREMITIES:  pulses palpable; no clubbing, cyanosis, or edema b/l LEs  SKIN: no rashes or lesions      LABS:                        11.8   8.03  )-----------( 206      ( 20 Aug 2019 17:09 )             34.4     08-20    136  |  103  |  14  ----------------------------<  127<H>  4.0   |  27  |  1.06    Ca    9.1      20 Aug 2019 17:09  Mg     2.3     08-20    TPro  7.9  /  Alb  3.4  /  TBili  0.4  /  DBili  x   /  AST  31  /  ALT  31  /  AlkPhos  83  08-20

## 2019-08-22 NOTE — DISCHARGE NOTE NURSING/CASE MANAGEMENT/SOCIAL WORK - NSDCPEEMAIL_GEN_ALL_CORE
[facility] email [email or web address]

## 2019-08-29 ENCOUNTER — APPOINTMENT (OUTPATIENT)
Dept: ULTRASOUND IMAGING | Facility: HOSPITAL | Age: 79
End: 2019-08-29

## 2019-08-29 ENCOUNTER — OUTPATIENT (OUTPATIENT)
Dept: OUTPATIENT SERVICES | Facility: HOSPITAL | Age: 79
LOS: 1 days | Discharge: HOME | End: 2019-08-29
Payer: MEDICARE

## 2019-08-29 DIAGNOSIS — N28.1 CYST OF KIDNEY, ACQUIRED: ICD-10-CM

## 2019-08-29 DIAGNOSIS — N23 UNSPECIFIED RENAL COLIC: ICD-10-CM

## 2019-08-29 DIAGNOSIS — R42 DIZZINESS AND GIDDINESS: ICD-10-CM

## 2019-08-29 DIAGNOSIS — E11.9 TYPE 2 DIABETES MELLITUS WITHOUT COMPLICATIONS: ICD-10-CM
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
## 2019-08-29 DIAGNOSIS — I10 ESSENTIAL (PRIMARY) HYPERTENSION: ICD-10-CM

## 2019-08-29 DIAGNOSIS — F10.10 ALCOHOL ABUSE, UNCOMPLICATED: ICD-10-CM

## 2019-08-29 DIAGNOSIS — E78.5 HYPERLIPIDEMIA, UNSPECIFIED: ICD-10-CM

## 2019-08-29 DIAGNOSIS — Z79.84 LONG TERM (CURRENT) USE OF ORAL HYPOGLYCEMIC DRUGS: ICD-10-CM

## 2019-08-29 DIAGNOSIS — S22.41XA MULTIPLE FRACTURES OF RIBS, RIGHT SIDE, INITIAL ENCOUNTER FOR CLOSED FRACTURE: ICD-10-CM
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
## 2019-08-29 PROCEDURE — 76775 US EXAM ABDO BACK WALL LIM: CPT | Mod: 26

## 2021-03-08 NOTE — ED ADULT NURSE NOTE - NSSEPSISNEWALTERMENTAL_ED_A_ED
No
17M p/w palpitations x 3 days. Hx of anxiety. Vitals reviewed to be wnl. Physical-nad,perrl,mmm,rrr,ctab,abd softntnd,fromx4,anox3. ekg- nl. ED CXR reviewed by me which did not reveal a ptx, infiltrate, or effusion. Pt is well appearing, no emergent interventions, will fu with pmd. dc home

## 2022-01-01 NOTE — ED ADULT NURSE NOTE - CAS TRG GENERAL AIRWAY, MLM
Received new patient referral to Pediatric Cardiology for Trisomy 21, Down syndrome . Patient is scheduled 5/5. Sent new patient letter via email.    Parents are aware, procedures/surgeries are completed in [place].    Does the patient have [employer] coverage? No    If yes, parent is aware any appointments or procedures scheduled in [place] may not be authorized and they may have to be seen at [facility] in [place] as they are in [facility].    
Patent

## 2022-09-09 NOTE — ED ADULT NURSE NOTE - TEMPLATE
-- DO NOT REPLY / DO NOT REPLY ALL --  -- Message is from Engagement Center Operations (ECO) --    General Patient Message Please call patient 9/9/2022. She is requesting if her procedure scheduled 9/23/2022 can be done earlier.     Caller Information       Type Contact Phone/Fax    09/08/2022 09:14 PM CDT Phone (Incoming) Sheryl Guo (Self) [phone] (M)        Alternative phone number: [phone]    Can a detailed message be left? Yes    Message Turnaround:     Is it Working Hours? No - After Hours/Weekend/Holiday     Did the caller agree that this message can wait until the office reopens in the morning? YES - The Message Can Wait - Send a message to the provider's clinical support pool     [place]:    Please give this turnaround time to the caller:   \"This message will be sent to [state Provider's name]. The clinical team will fulfill your request as soon as they review your message.\"                
-- DO NOT REPLY / DO NOT REPLY ALL --  -- Message is from [facility] Center [employer] (ECO) --    Provider paged via [employer] Documentation - The below message was copied and pasted from a [employer] page:      8/19/2022 3:04:16 PM  [facility] Contact Center  ACC NURSE  Umer Rodriguez MD  Secure Text  [phone] PATIENT NUMBER -------------------------------- ACC NURSE LINE (IF QUESTIONS ONLY - [phone]) URGENT FROM: [facility] REQUESTED DR:UMER RODRIGUEZ DR. DR. UMER RODRIGUEZ DECLINED ED DISPOSITION FOR INTERMITTENT GASPING FOR AIR WHILE WALKING , CHEST DISCOMFORT IN BETWEEN BREAST . PLEASE CALL TO ADVISED. [phone]. ANTU          Route encounter to 'Provider On-Call' clinical support pool that was paged. 'Sign and Close Workspace'  
Onset: since 5/8/2022  Location / description: rectal bleeding and left sided abdominal pain  Precipitating Factors: history of rectal bleeding, HTN, diabettes  Pain Scale (1-10), 10 highest: 3-4/10  Associated Symptoms: moderate bleeding and patient notices her medication in stool, frequent thirst, occasional lightheadedness,  What improves / worsens symptoms: none  Symptom specific medications: none  LMP : No LMP recorded (lmp unknown). Patient has had a hysterectomy.  Are you pregnant or breast feeding: NA  Recent visits (last 3-4 weeks) for same reason or recent surgery: 8/23/2022    PLAN:   Directed to Emergency Department    Patient/Caller refuses to follow recommendations.    Reason for Disposition  • [1] MODERATE rectal bleeding (small blood clots, passing blood without stool, or toilet water turns red) AND [2] more than once a day    Protocols used: RECTAL BLEEDING-A-  Patient has GI procedure scheduled 9/23/2022. Message sent to Dr. Mars's office (GI provider) per patient request for earlier date if possible.     
Patient having a lot of anxiety related symptoms, will evaluate on August 23  
Regarding: [place] rectal bleeding abdominal pain on left   ----- Message from Cassi Bill sent at 9/8/2022  9:18 PM CDT -----  ..Patient Name: Sheryl Guo    Specialist or PCP Name: Umer Rodriguez     Symptoms: rectal bleeding with abdominal pain on left side     Pregnant (females aged 13-60. If Yes, how long?) : n/a     Call Back # : [phone]    Which State are you currently located in?: [place]     Name of Clinic Site / [facility]     Use following scripting for patients waiting for a callback:   “Nurse callback times vary based on call volumes; please be aware the return phone call may come from an unidentified phone number. If your symptoms worsen or become life threatening while waiting, you should seek immediate assistance by calling 911 or going to the ER for evaluation.”   
Spoke to pt's daughter jayla brownx rescheduled to 09/16 from 09/23. Covid appt 09/13. Radha verbalized all understanding.  
Fall

## 2022-09-30 ENCOUNTER — INPATIENT (INPATIENT)
Facility: HOSPITAL | Age: 82
LOS: 4 days | Discharge: NURSING, REHAB OR LONG-TERM CARE | End: 2022-10-05
Attending: HOSPITALIST | Admitting: HOSPITALIST

## 2022-09-30 VITALS
SYSTOLIC BLOOD PRESSURE: 177 MMHG | RESPIRATION RATE: 17 BRPM | DIASTOLIC BLOOD PRESSURE: 84 MMHG | HEIGHT: 68 IN | TEMPERATURE: 99 F | WEIGHT: 229.94 LBS | OXYGEN SATURATION: 97 % | HEART RATE: 93 BPM

## 2022-09-30 LAB
ALBUMIN SERPL ELPH-MCNC: 3.5 G/DL — SIGNIFICANT CHANGE UP (ref 3.3–5)
ALP SERPL-CCNC: 84 U/L — SIGNIFICANT CHANGE UP (ref 40–120)
ALT FLD-CCNC: 16 U/L — SIGNIFICANT CHANGE UP (ref 12–78)
AMMONIA BLD-MCNC: <10 UMOL/L — LOW (ref 11–32)
ANION GAP SERPL CALC-SCNC: 10 MMOL/L — SIGNIFICANT CHANGE UP (ref 5–17)
APTT BLD: 28.2 SEC — SIGNIFICANT CHANGE UP (ref 27.5–35.5)
AST SERPL-CCNC: 11 U/L — LOW (ref 15–37)
BASOPHILS # BLD AUTO: 0.04 K/UL — SIGNIFICANT CHANGE UP (ref 0–0.2)
BASOPHILS NFR BLD AUTO: 0.5 % — SIGNIFICANT CHANGE UP (ref 0–2)
BILIRUB SERPL-MCNC: 0.8 MG/DL — SIGNIFICANT CHANGE UP (ref 0.2–1.2)
BLD GP AB SCN SERPL QL: SIGNIFICANT CHANGE UP
BUN SERPL-MCNC: 7 MG/DL — SIGNIFICANT CHANGE UP (ref 7–23)
CALCIUM SERPL-MCNC: 9.6 MG/DL — SIGNIFICANT CHANGE UP (ref 8.5–10.1)
CHLORIDE SERPL-SCNC: 102 MMOL/L — SIGNIFICANT CHANGE UP (ref 96–108)
CO2 SERPL-SCNC: 26 MMOL/L — SIGNIFICANT CHANGE UP (ref 22–31)
CREAT SERPL-MCNC: 1.07 MG/DL — SIGNIFICANT CHANGE UP (ref 0.5–1.3)
EGFR: 70 ML/MIN/1.73M2 — SIGNIFICANT CHANGE UP
EOSINOPHIL # BLD AUTO: 0 K/UL — SIGNIFICANT CHANGE UP (ref 0–0.5)
EOSINOPHIL NFR BLD AUTO: 0 % — SIGNIFICANT CHANGE UP (ref 0–6)
FLUAV AG NPH QL: SIGNIFICANT CHANGE UP
FLUBV AG NPH QL: SIGNIFICANT CHANGE UP
GLUCOSE BLDC GLUCOMTR-MCNC: 180 MG/DL — HIGH (ref 70–99)
GLUCOSE SERPL-MCNC: 176 MG/DL — HIGH (ref 70–99)
HCT VFR BLD CALC: 41.7 % — SIGNIFICANT CHANGE UP (ref 39–50)
HGB BLD-MCNC: 14.5 G/DL — SIGNIFICANT CHANGE UP (ref 13–17)
IMM GRANULOCYTES NFR BLD AUTO: 0.2 % — SIGNIFICANT CHANGE UP (ref 0–0.9)
INR BLD: 1.03 RATIO — SIGNIFICANT CHANGE UP (ref 0.88–1.16)
LACTATE SERPL-SCNC: 2.6 MMOL/L — HIGH (ref 0.7–2)
LYMPHOCYTES # BLD AUTO: 1.06 K/UL — SIGNIFICANT CHANGE UP (ref 1–3.3)
LYMPHOCYTES # BLD AUTO: 12.6 % — LOW (ref 13–44)
MAGNESIUM SERPL-MCNC: 1.5 MG/DL — LOW (ref 1.6–2.6)
MCHC RBC-ENTMCNC: 34 PG — SIGNIFICANT CHANGE UP (ref 27–34)
MCHC RBC-ENTMCNC: 34.8 G/DL — SIGNIFICANT CHANGE UP (ref 32–36)
MCV RBC AUTO: 97.7 FL — SIGNIFICANT CHANGE UP (ref 80–100)
MONOCYTES # BLD AUTO: 0.22 K/UL — SIGNIFICANT CHANGE UP (ref 0–0.9)
MONOCYTES NFR BLD AUTO: 2.6 % — SIGNIFICANT CHANGE UP (ref 2–14)
NEUTROPHILS # BLD AUTO: 7.07 K/UL — SIGNIFICANT CHANGE UP (ref 1.8–7.4)
NEUTROPHILS NFR BLD AUTO: 84.1 % — HIGH (ref 43–77)
NRBC # BLD: 0 /100 WBCS — SIGNIFICANT CHANGE UP (ref 0–0)
PLATELET # BLD AUTO: 234 K/UL — SIGNIFICANT CHANGE UP (ref 150–400)
POTASSIUM SERPL-MCNC: 3.8 MMOL/L — SIGNIFICANT CHANGE UP (ref 3.5–5.3)
POTASSIUM SERPL-SCNC: 3.8 MMOL/L — SIGNIFICANT CHANGE UP (ref 3.5–5.3)
PROT SERPL-MCNC: 8.3 GM/DL — SIGNIFICANT CHANGE UP (ref 6–8.3)
PROTHROM AB SERPL-ACNC: 12.3 SEC — SIGNIFICANT CHANGE UP (ref 10.5–13.4)
RBC # BLD: 4.27 M/UL — SIGNIFICANT CHANGE UP (ref 4.2–5.8)
RBC # FLD: 13.2 % — SIGNIFICANT CHANGE UP (ref 10.3–14.5)
SARS-COV-2 RNA SPEC QL NAA+PROBE: SIGNIFICANT CHANGE UP
SODIUM SERPL-SCNC: 138 MMOL/L — SIGNIFICANT CHANGE UP (ref 135–145)
WBC # BLD: 8.41 K/UL — SIGNIFICANT CHANGE UP (ref 3.8–10.5)
WBC # FLD AUTO: 8.41 K/UL — SIGNIFICANT CHANGE UP (ref 3.8–10.5)

## 2022-09-30 PROCEDURE — 74177 CT ABD & PELVIS W/CONTRAST: CPT | Mod: 26,MA

## 2022-09-30 PROCEDURE — 99291 CRITICAL CARE FIRST HOUR: CPT

## 2022-09-30 RX ORDER — IBUPROFEN 200 MG
600 TABLET ORAL ONCE
Refills: 0 | Status: COMPLETED | OUTPATIENT
Start: 2022-09-30 | End: 2022-09-30

## 2022-09-30 RX ORDER — ONDANSETRON 8 MG/1
8 TABLET, FILM COATED ORAL ONCE
Refills: 0 | Status: COMPLETED | OUTPATIENT
Start: 2022-09-30 | End: 2022-09-30

## 2022-09-30 RX ORDER — PHENOBARBITAL 60 MG
260 TABLET ORAL ONCE
Refills: 0 | Status: DISCONTINUED | OUTPATIENT
Start: 2022-09-30 | End: 2022-09-30

## 2022-09-30 RX ORDER — THIAMINE MONONITRATE (VIT B1) 100 MG
500 TABLET ORAL ONCE
Refills: 0 | Status: COMPLETED | OUTPATIENT
Start: 2022-09-30 | End: 2022-09-30

## 2022-09-30 RX ORDER — SODIUM CHLORIDE 9 MG/ML
1000 INJECTION INTRAMUSCULAR; INTRAVENOUS; SUBCUTANEOUS ONCE
Refills: 0 | Status: COMPLETED | OUTPATIENT
Start: 2022-09-30 | End: 2022-09-30

## 2022-09-30 RX ORDER — ACETAMINOPHEN 500 MG
975 TABLET ORAL ONCE
Refills: 0 | Status: COMPLETED | OUTPATIENT
Start: 2022-09-30 | End: 2022-09-30

## 2022-09-30 RX ORDER — PIPERACILLIN AND TAZOBACTAM 4; .5 G/20ML; G/20ML
3.38 INJECTION, POWDER, LYOPHILIZED, FOR SOLUTION INTRAVENOUS ONCE
Refills: 0 | Status: COMPLETED | OUTPATIENT
Start: 2022-09-30 | End: 2022-09-30

## 2022-09-30 RX ADMIN — PIPERACILLIN AND TAZOBACTAM 200 GRAM(S): 4; .5 INJECTION, POWDER, LYOPHILIZED, FOR SOLUTION INTRAVENOUS at 22:25

## 2022-09-30 RX ADMIN — ONDANSETRON 8 MILLIGRAM(S): 8 TABLET, FILM COATED ORAL at 20:33

## 2022-09-30 RX ADMIN — Medication 975 MILLIGRAM(S): at 23:51

## 2022-09-30 RX ADMIN — Medication 260 MILLIGRAM(S): at 22:25

## 2022-09-30 RX ADMIN — Medication 2 MILLIGRAM(S): at 22:25

## 2022-09-30 RX ADMIN — SODIUM CHLORIDE 1000 MILLILITER(S): 9 INJECTION INTRAMUSCULAR; INTRAVENOUS; SUBCUTANEOUS at 22:40

## 2022-09-30 RX ADMIN — SODIUM CHLORIDE 1000 MILLILITER(S): 9 INJECTION INTRAMUSCULAR; INTRAVENOUS; SUBCUTANEOUS at 20:33

## 2022-09-30 NOTE — ED ADULT TRIAGE NOTE - CHIEF COMPLAINT QUOTE
As per EMS pt's family reports pt with nausea and vomiting. Vomits described as coffee grounds. Denies taking blood thinners.

## 2022-09-30 NOTE — ED ADULT NURSE NOTE - ED STAT RN HANDOFF DETAILS
Handoff report given to CHANDRIKA Benitez. Pt responsive to painful stimuli, respirations appear equal and unlabored, v/s as documented. Tylenol administered for elevated temp. NAD noted. Safety measures maintained.

## 2022-09-30 NOTE — ED PROVIDER NOTE - CLINICAL SUMMARY MEDICAL DECISION MAKING FREE TEXT BOX
pt pw ams/delirium. concern for etoh withdrawal. will give phenobarb and benzos. concern for sepsis with abd pathology. needs ct scan. needs fluids.  pt hx of etoh abuse. consider withdrawal? start phenobarb and ativan. ciwa protocol. give thiamine.   I read ekg as afib rate 83, no st elevation or depression, qtc 470, narrow qrs, normal axis. no hx of afib. favor starting on heparin AFTER rule out does not need OR.

## 2022-09-30 NOTE — ED ADULT NURSE NOTE - NSIMPLEMENTINTERV_GEN_ALL_ED
Implemented All Universal Safety Interventions:  [place] to call system. Call bell, personal items and telephone within reach. Instruct patient to call for assistance. Room bathroom lighting operational. Non-slip footwear when patient is off stretcher. Physically safe environment: no spills, clutter or unnecessary equipment. Stretcher in lowest position, wheels locked, appropriate side rails in place.

## 2022-09-30 NOTE — ED PROVIDER NOTE - OBJECTIVE STATEMENT
81M hx dm, htn, chronic cholecystitis pw vomiting x3 hrs. per daughter, she came home and saw him with coffee grounds in his vomit basin and decreased responsiveness.

## 2022-09-30 NOTE — ED ADULT NURSE NOTE - OBJECTIVE STATEMENT
81 year old male came in c/o As per EMS pt's family reports pt with nausea and vomiting. Vomits described as coffee grounds. Denies taking blood thinners. placed on the monitors upon A&Ox3, family a bedside, not actively vomiting, iv placed , lab done, fluids running, PMH HTN, Diabetes complaint with medication, denies abd pain, denies constipation denies past alcohol abuse

## 2022-09-30 NOTE — PHARMACOTHERAPY INTERVENTION NOTE - COMMENTS
Spoke to MD and suggested to d/c ativan or phenobarbital, not both. MD wants to continue order as is.

## 2022-09-30 NOTE — ED PROVIDER NOTE - PHYSICAL EXAMINATION
Gen: Alert, NAD  Head: NC, AT   Eyes: PERRL, EOMI, normal lids/conjunctiva  ENT: normal hearing, patent oropharynx without erythema/exudate, uvula midline  Neck: supple, no tenderness, Trachea midline  Pulm: Bilateral BS, normal resp effort, no wheeze/stridor/retractions  CV: RRR, no M/R/G, 2+ radial and dp pulses bl, no edema  Abd: obese, epigastric ttp.   Mskel: extremities x4 with normal ROM and no joint effusions. no ctl spine ttp.   Skin: no rash, no bruising   Neuro: AAOx1 not to self or time, whole body tremors

## 2022-10-01 DIAGNOSIS — F10.11 ALCOHOL ABUSE, IN REMISSION: ICD-10-CM

## 2022-10-01 DIAGNOSIS — E11.9 TYPE 2 DIABETES MELLITUS WITHOUT COMPLICATIONS: ICD-10-CM

## 2022-10-01 DIAGNOSIS — E87.2 ACIDOSIS: ICD-10-CM

## 2022-10-01 DIAGNOSIS — I10 ESSENTIAL (PRIMARY) HYPERTENSION: ICD-10-CM

## 2022-10-01 DIAGNOSIS — K92.0 HEMATEMESIS: ICD-10-CM

## 2022-10-01 DIAGNOSIS — I48.91 UNSPECIFIED ATRIAL FIBRILLATION: ICD-10-CM

## 2022-10-01 DIAGNOSIS — E83.42 HYPOMAGNESEMIA: ICD-10-CM

## 2022-10-01 DIAGNOSIS — K92.2 GASTROINTESTINAL HEMORRHAGE, UNSPECIFIED: ICD-10-CM

## 2022-10-01 LAB
A1C WITH ESTIMATED AVERAGE GLUCOSE RESULT: 6.4 % — HIGH (ref 4–5.6)
ALBUMIN SERPL ELPH-MCNC: 3.5 G/DL — SIGNIFICANT CHANGE UP (ref 3.3–5)
ALP SERPL-CCNC: 83 U/L — SIGNIFICANT CHANGE UP (ref 40–120)
ALT FLD-CCNC: 15 U/L — SIGNIFICANT CHANGE UP (ref 12–78)
ANION GAP SERPL CALC-SCNC: 8 MMOL/L — SIGNIFICANT CHANGE UP (ref 5–17)
APPEARANCE UR: CLEAR — SIGNIFICANT CHANGE UP
AST SERPL-CCNC: 13 U/L — LOW (ref 15–37)
BACTERIA # UR AUTO: NEGATIVE — SIGNIFICANT CHANGE UP
BASE EXCESS BLDA CALC-SCNC: 2.8 MMOL/L — SIGNIFICANT CHANGE UP (ref -2–3)
BASOPHILS # BLD AUTO: 0.02 K/UL — SIGNIFICANT CHANGE UP (ref 0–0.2)
BASOPHILS # BLD AUTO: 0.03 K/UL — SIGNIFICANT CHANGE UP (ref 0–0.2)
BASOPHILS NFR BLD AUTO: 0.2 % — SIGNIFICANT CHANGE UP (ref 0–2)
BASOPHILS NFR BLD AUTO: 0.3 % — SIGNIFICANT CHANGE UP (ref 0–2)
BILIRUB SERPL-MCNC: 0.9 MG/DL — SIGNIFICANT CHANGE UP (ref 0.2–1.2)
BILIRUB UR-MCNC: NEGATIVE — SIGNIFICANT CHANGE UP
BLOOD GAS COMMENTS ARTERIAL: SIGNIFICANT CHANGE UP
BUN SERPL-MCNC: 8 MG/DL — SIGNIFICANT CHANGE UP (ref 7–23)
CALCIUM SERPL-MCNC: 9.2 MG/DL — SIGNIFICANT CHANGE UP (ref 8.5–10.1)
CHLORIDE SERPL-SCNC: 102 MMOL/L — SIGNIFICANT CHANGE UP (ref 96–108)
CHOLEST SERPL-MCNC: 184 MG/DL — SIGNIFICANT CHANGE UP
CO2 BLDA-SCNC: 27 MMOL/L — HIGH (ref 19–24)
CO2 SERPL-SCNC: 26 MMOL/L — SIGNIFICANT CHANGE UP (ref 22–31)
COLOR SPEC: SIGNIFICANT CHANGE UP
CREAT SERPL-MCNC: 1 MG/DL — SIGNIFICANT CHANGE UP (ref 0.5–1.3)
DIFF PNL FLD: ABNORMAL
EGFR: 76 ML/MIN/1.73M2 — SIGNIFICANT CHANGE UP
EOSINOPHIL # BLD AUTO: 0 K/UL — SIGNIFICANT CHANGE UP (ref 0–0.5)
EOSINOPHIL # BLD AUTO: 0.01 K/UL — SIGNIFICANT CHANGE UP (ref 0–0.5)
EOSINOPHIL NFR BLD AUTO: 0 % — SIGNIFICANT CHANGE UP (ref 0–6)
EOSINOPHIL NFR BLD AUTO: 0.1 % — SIGNIFICANT CHANGE UP (ref 0–6)
EPI CELLS # UR: SIGNIFICANT CHANGE UP
ESTIMATED AVERAGE GLUCOSE: 137 MG/DL — HIGH (ref 68–114)
ETHANOL SERPL-MCNC: <10 MG/DL — SIGNIFICANT CHANGE UP (ref 0–10)
GAS PNL BLDA: SIGNIFICANT CHANGE UP
GLUCOSE BLDC GLUCOMTR-MCNC: 121 MG/DL — HIGH (ref 70–99)
GLUCOSE BLDC GLUCOMTR-MCNC: 139 MG/DL — HIGH (ref 70–99)
GLUCOSE SERPL-MCNC: 116 MG/DL — HIGH (ref 70–99)
GLUCOSE UR QL: 50 MG/DL
HCO3 BLDA-SCNC: 26 MMOL/L — SIGNIFICANT CHANGE UP (ref 21–28)
HCT VFR BLD CALC: 40.6 % — SIGNIFICANT CHANGE UP (ref 39–50)
HCT VFR BLD CALC: 41 % — SIGNIFICANT CHANGE UP (ref 39–50)
HDLC SERPL-MCNC: 56 MG/DL — SIGNIFICANT CHANGE UP
HGB BLD-MCNC: 14.1 G/DL — SIGNIFICANT CHANGE UP (ref 13–17)
HGB BLD-MCNC: 14.2 G/DL — SIGNIFICANT CHANGE UP (ref 13–17)
HOROWITZ INDEX BLDA+IHG-RTO: 21 — SIGNIFICANT CHANGE UP
IMM GRANULOCYTES NFR BLD AUTO: 0.3 % — SIGNIFICANT CHANGE UP (ref 0–0.9)
IMM GRANULOCYTES NFR BLD AUTO: 0.4 % — SIGNIFICANT CHANGE UP (ref 0–0.9)
KETONES UR-MCNC: ABNORMAL
LACTATE SERPL-SCNC: 2.1 MMOL/L — HIGH (ref 0.7–2)
LEUKOCYTE ESTERASE UR-ACNC: ABNORMAL
LIDOCAIN IGE QN: 105 U/L — SIGNIFICANT CHANGE UP (ref 73–393)
LIPID PNL WITH DIRECT LDL SERPL: 112 MG/DL — HIGH
LYMPHOCYTES # BLD AUTO: 1.4 K/UL — SIGNIFICANT CHANGE UP (ref 1–3.3)
LYMPHOCYTES # BLD AUTO: 16.7 % — SIGNIFICANT CHANGE UP (ref 13–44)
LYMPHOCYTES # BLD AUTO: 2.55 K/UL — SIGNIFICANT CHANGE UP (ref 1–3.3)
LYMPHOCYTES # BLD AUTO: 26.6 % — SIGNIFICANT CHANGE UP (ref 13–44)
MAGNESIUM SERPL-MCNC: 2.2 MG/DL — SIGNIFICANT CHANGE UP (ref 1.6–2.6)
MCHC RBC-ENTMCNC: 34.1 PG — HIGH (ref 27–34)
MCHC RBC-ENTMCNC: 34.6 G/DL — SIGNIFICANT CHANGE UP (ref 32–36)
MCHC RBC-ENTMCNC: 34.7 G/DL — SIGNIFICANT CHANGE UP (ref 32–36)
MCHC RBC-ENTMCNC: 34.8 PG — HIGH (ref 27–34)
MCV RBC AUTO: 100.2 FL — HIGH (ref 80–100)
MCV RBC AUTO: 98.3 FL — SIGNIFICANT CHANGE UP (ref 80–100)
MONOCYTES # BLD AUTO: 0.4 K/UL — SIGNIFICANT CHANGE UP (ref 0–0.9)
MONOCYTES # BLD AUTO: 0.87 K/UL — SIGNIFICANT CHANGE UP (ref 0–0.9)
MONOCYTES NFR BLD AUTO: 4.8 % — SIGNIFICANT CHANGE UP (ref 2–14)
MONOCYTES NFR BLD AUTO: 9.1 % — SIGNIFICANT CHANGE UP (ref 2–14)
NEUTROPHILS # BLD AUTO: 6.1 K/UL — SIGNIFICANT CHANGE UP (ref 1.8–7.4)
NEUTROPHILS # BLD AUTO: 6.53 K/UL — SIGNIFICANT CHANGE UP (ref 1.8–7.4)
NEUTROPHILS NFR BLD AUTO: 63.7 % — SIGNIFICANT CHANGE UP (ref 43–77)
NEUTROPHILS NFR BLD AUTO: 77.8 % — HIGH (ref 43–77)
NITRITE UR-MCNC: POSITIVE
NON HDL CHOLESTEROL: 128 MG/DL — SIGNIFICANT CHANGE UP
NRBC # BLD: 0 /100 WBCS — SIGNIFICANT CHANGE UP (ref 0–0)
NRBC # BLD: 0 /100 WBCS — SIGNIFICANT CHANGE UP (ref 0–0)
PCO2 BLDA: 35 MMHG — SIGNIFICANT CHANGE UP (ref 32–46)
PH BLDA: 7.48 — HIGH (ref 7.35–7.45)
PH UR: 7 — SIGNIFICANT CHANGE UP (ref 5–8)
PHOSPHATE SERPL-MCNC: 2.9 MG/DL — SIGNIFICANT CHANGE UP (ref 2.5–4.5)
PLATELET # BLD AUTO: 220 K/UL — SIGNIFICANT CHANGE UP (ref 150–400)
PLATELET # BLD AUTO: 231 K/UL — SIGNIFICANT CHANGE UP (ref 150–400)
PO2 BLDA: 93 MMHG — SIGNIFICANT CHANGE UP (ref 83–108)
POTASSIUM SERPL-MCNC: 3.3 MMOL/L — LOW (ref 3.5–5.3)
POTASSIUM SERPL-SCNC: 3.3 MMOL/L — LOW (ref 3.5–5.3)
PROT SERPL-MCNC: 8.5 GM/DL — HIGH (ref 6–8.3)
PROT UR-MCNC: 15 MG/DL
RBC # BLD: 4.05 M/UL — LOW (ref 4.2–5.8)
RBC # BLD: 4.17 M/UL — LOW (ref 4.2–5.8)
RBC # FLD: 13.3 % — SIGNIFICANT CHANGE UP (ref 10.3–14.5)
RBC # FLD: 13.4 % — SIGNIFICANT CHANGE UP (ref 10.3–14.5)
RBC CASTS # UR COMP ASSIST: SIGNIFICANT CHANGE UP /HPF (ref 0–4)
SAO2 % BLDA: 98.9 % — HIGH (ref 94–98)
SODIUM SERPL-SCNC: 136 MMOL/L — SIGNIFICANT CHANGE UP (ref 135–145)
SP GR SPEC: 1 — LOW (ref 1.01–1.02)
TRIGL SERPL-MCNC: 79 MG/DL — SIGNIFICANT CHANGE UP
TSH SERPL-MCNC: 0.56 UIU/ML — SIGNIFICANT CHANGE UP (ref 0.36–3.74)
UROBILINOGEN FLD QL: NEGATIVE MG/DL — SIGNIFICANT CHANGE UP
WBC # BLD: 8.39 K/UL — SIGNIFICANT CHANGE UP (ref 3.8–10.5)
WBC # BLD: 9.58 K/UL — SIGNIFICANT CHANGE UP (ref 3.8–10.5)
WBC # FLD AUTO: 8.39 K/UL — SIGNIFICANT CHANGE UP (ref 3.8–10.5)
WBC # FLD AUTO: 9.58 K/UL — SIGNIFICANT CHANGE UP (ref 3.8–10.5)
WBC UR QL: SIGNIFICANT CHANGE UP

## 2022-10-01 PROCEDURE — 93306 TTE W/DOPPLER COMPLETE: CPT | Mod: 26

## 2022-10-01 PROCEDURE — 93010 ELECTROCARDIOGRAM REPORT: CPT

## 2022-10-01 PROCEDURE — 88305 TISSUE EXAM BY PATHOLOGIST: CPT | Mod: 26

## 2022-10-01 PROCEDURE — 70450 CT HEAD/BRAIN W/O DYE: CPT | Mod: 26

## 2022-10-01 PROCEDURE — 88312 SPECIAL STAINS GROUP 1: CPT | Mod: 26

## 2022-10-01 PROCEDURE — 12345: CPT | Mod: NC

## 2022-10-01 PROCEDURE — 99223 1ST HOSP IP/OBS HIGH 75: CPT

## 2022-10-01 RX ORDER — MECLIZINE HCL 12.5 MG
25 TABLET ORAL
Refills: 0 | Status: DISCONTINUED | OUTPATIENT
Start: 2022-10-01 | End: 2022-10-05

## 2022-10-01 RX ORDER — SODIUM CHLORIDE 9 MG/ML
1000 INJECTION, SOLUTION INTRAVENOUS
Refills: 0 | Status: DISCONTINUED | OUTPATIENT
Start: 2022-10-01 | End: 2022-10-05

## 2022-10-01 RX ORDER — LANOLIN ALCOHOL/MO/W.PET/CERES
3 CREAM (GRAM) TOPICAL AT BEDTIME
Refills: 0 | Status: DISCONTINUED | OUTPATIENT
Start: 2022-10-01 | End: 2022-10-05

## 2022-10-01 RX ORDER — INSULIN LISPRO 100/ML
VIAL (ML) SUBCUTANEOUS
Refills: 0 | Status: DISCONTINUED | OUTPATIENT
Start: 2022-10-01 | End: 2022-10-05

## 2022-10-01 RX ORDER — DEXTROSE 50 % IN WATER 50 %
12.5 SYRINGE (ML) INTRAVENOUS ONCE
Refills: 0 | Status: DISCONTINUED | OUTPATIENT
Start: 2022-10-01 | End: 2022-10-05

## 2022-10-01 RX ORDER — GLUCAGON INJECTION, SOLUTION 0.5 MG/.1ML
1 INJECTION, SOLUTION SUBCUTANEOUS ONCE
Refills: 0 | Status: DISCONTINUED | OUTPATIENT
Start: 2022-10-01 | End: 2022-10-05

## 2022-10-01 RX ORDER — THIAMINE MONONITRATE (VIT B1) 100 MG
100 TABLET ORAL DAILY
Refills: 0 | Status: DISCONTINUED | OUTPATIENT
Start: 2022-10-01 | End: 2022-10-05

## 2022-10-01 RX ORDER — DEXTROSE 50 % IN WATER 50 %
25 SYRINGE (ML) INTRAVENOUS ONCE
Refills: 0 | Status: DISCONTINUED | OUTPATIENT
Start: 2022-10-01 | End: 2022-10-05

## 2022-10-01 RX ORDER — POTASSIUM CHLORIDE 20 MEQ
10 PACKET (EA) ORAL
Refills: 0 | Status: COMPLETED | OUTPATIENT
Start: 2022-10-01 | End: 2022-10-01

## 2022-10-01 RX ORDER — DEXTROSE MONOHYDRATE, SODIUM CHLORIDE, AND POTASSIUM CHLORIDE 50; .745; 4.5 G/1000ML; G/1000ML; G/1000ML
1000 INJECTION, SOLUTION INTRAVENOUS
Refills: 0 | Status: DISCONTINUED | OUTPATIENT
Start: 2022-10-01 | End: 2022-10-05

## 2022-10-01 RX ORDER — SODIUM CHLORIDE 9 MG/ML
1000 INJECTION INTRAMUSCULAR; INTRAVENOUS; SUBCUTANEOUS
Refills: 0 | Status: DISCONTINUED | OUTPATIENT
Start: 2022-10-01 | End: 2022-10-01

## 2022-10-01 RX ORDER — INFLUENZA VIRUS VACCINE 15; 15; 15; 15 UG/.5ML; UG/.5ML; UG/.5ML; UG/.5ML
0.7 SUSPENSION INTRAMUSCULAR ONCE
Refills: 0 | Status: DISCONTINUED | OUTPATIENT
Start: 2022-10-01 | End: 2022-10-05

## 2022-10-01 RX ORDER — ACETAMINOPHEN 500 MG
650 TABLET ORAL EVERY 6 HOURS
Refills: 0 | Status: DISCONTINUED | OUTPATIENT
Start: 2022-10-01 | End: 2022-10-05

## 2022-10-01 RX ORDER — POTASSIUM CHLORIDE 20 MEQ
40 PACKET (EA) ORAL EVERY 4 HOURS
Refills: 0 | Status: COMPLETED | OUTPATIENT
Start: 2022-10-01 | End: 2022-10-01

## 2022-10-01 RX ORDER — AMLODIPINE BESYLATE 2.5 MG/1
5 TABLET ORAL DAILY
Refills: 0 | Status: DISCONTINUED | OUTPATIENT
Start: 2022-10-01 | End: 2022-10-03

## 2022-10-01 RX ORDER — ONDANSETRON 8 MG/1
4 TABLET, FILM COATED ORAL EVERY 8 HOURS
Refills: 0 | Status: DISCONTINUED | OUTPATIENT
Start: 2022-10-01 | End: 2022-10-05

## 2022-10-01 RX ORDER — DEXTROSE 50 % IN WATER 50 %
15 SYRINGE (ML) INTRAVENOUS ONCE
Refills: 0 | Status: DISCONTINUED | OUTPATIENT
Start: 2022-10-01 | End: 2022-10-05

## 2022-10-01 RX ORDER — LOSARTAN POTASSIUM 100 MG/1
100 TABLET, FILM COATED ORAL DAILY
Refills: 0 | Status: DISCONTINUED | OUTPATIENT
Start: 2022-10-01 | End: 2022-10-05

## 2022-10-01 RX ORDER — PANTOPRAZOLE SODIUM 20 MG/1
40 TABLET, DELAYED RELEASE ORAL
Refills: 0 | Status: DISCONTINUED | OUTPATIENT
Start: 2022-10-01 | End: 2022-10-05

## 2022-10-01 RX ORDER — HYDROCHLOROTHIAZIDE 25 MG
25 TABLET ORAL DAILY
Refills: 0 | Status: DISCONTINUED | OUTPATIENT
Start: 2022-10-01 | End: 2022-10-05

## 2022-10-01 RX ORDER — FOLIC ACID 0.8 MG
1 TABLET ORAL DAILY
Refills: 0 | Status: DISCONTINUED | OUTPATIENT
Start: 2022-10-01 | End: 2022-10-05

## 2022-10-01 RX ORDER — ATORVASTATIN CALCIUM 80 MG/1
10 TABLET, FILM COATED ORAL AT BEDTIME
Refills: 0 | Status: DISCONTINUED | OUTPATIENT
Start: 2022-10-01 | End: 2022-10-05

## 2022-10-01 RX ORDER — MAGNESIUM SULFATE 500 MG/ML
1 VIAL (ML) INJECTION ONCE
Refills: 0 | Status: COMPLETED | OUTPATIENT
Start: 2022-10-01 | End: 2022-10-01

## 2022-10-01 RX ADMIN — PANTOPRAZOLE SODIUM 40 MILLIGRAM(S): 20 TABLET, DELAYED RELEASE ORAL at 17:05

## 2022-10-01 RX ADMIN — Medication 105 MILLIGRAM(S): at 00:13

## 2022-10-01 RX ADMIN — PANTOPRAZOLE SODIUM 40 MILLIGRAM(S): 20 TABLET, DELAYED RELEASE ORAL at 06:37

## 2022-10-01 RX ADMIN — Medication 100 GRAM(S): at 03:42

## 2022-10-01 RX ADMIN — Medication 100 MILLIEQUIVALENT(S): at 21:43

## 2022-10-01 RX ADMIN — PIPERACILLIN AND TAZOBACTAM 3.38 GRAM(S): 4; .5 INJECTION, POWDER, LYOPHILIZED, FOR SOLUTION INTRAVENOUS at 00:00

## 2022-10-01 RX ADMIN — Medication 25 MILLIGRAM(S): at 06:36

## 2022-10-01 RX ADMIN — Medication 1 MILLIGRAM(S): at 14:16

## 2022-10-01 RX ADMIN — Medication 1 TABLET(S): at 14:16

## 2022-10-01 RX ADMIN — DEXTROSE MONOHYDRATE, SODIUM CHLORIDE, AND POTASSIUM CHLORIDE 60 MILLILITER(S): 50; .745; 4.5 INJECTION, SOLUTION INTRAVENOUS at 15:16

## 2022-10-01 RX ADMIN — SODIUM CHLORIDE 100 MILLILITER(S): 9 INJECTION INTRAMUSCULAR; INTRAVENOUS; SUBCUTANEOUS at 03:55

## 2022-10-01 RX ADMIN — DEXTROSE MONOHYDRATE, SODIUM CHLORIDE, AND POTASSIUM CHLORIDE 60 MILLILITER(S): 50; .745; 4.5 INJECTION, SOLUTION INTRAVENOUS at 15:55

## 2022-10-01 RX ADMIN — Medication 100 MILLIGRAM(S): at 14:16

## 2022-10-01 RX ADMIN — SODIUM CHLORIDE 100 MILLILITER(S): 9 INJECTION INTRAMUSCULAR; INTRAVENOUS; SUBCUTANEOUS at 06:36

## 2022-10-01 RX ADMIN — Medication 975 MILLIGRAM(S): at 00:50

## 2022-10-01 RX ADMIN — Medication 650 MILLIGRAM(S): at 03:42

## 2022-10-01 RX ADMIN — LOSARTAN POTASSIUM 100 MILLIGRAM(S): 100 TABLET, FILM COATED ORAL at 06:37

## 2022-10-01 RX ADMIN — Medication 500 MILLIGRAM(S): at 01:30

## 2022-10-01 RX ADMIN — AMLODIPINE BESYLATE 5 MILLIGRAM(S): 2.5 TABLET ORAL at 06:37

## 2022-10-01 NOTE — PROGRESS NOTE ADULT - SUBJECTIVE AND OBJECTIVE BOX
81M hx DM, HLD, HTN, chronic cholecystitis, prior ETOH abuse p/w several episodes of coffee ground emesis, lactic acidosis and new onstet A. Fib and has begun to go into alcohol withdrawal. Pt has been satrted on IV PPI. Will call GI. Pt admitted to tele and consult cardio for     new onset AF. Hold off on AC/ASA due to likely upper GI bleed. Echo read is pending. Will also start CIWA w/ Ativan for alcohol withdrawal. [facility].    81M hx DM, HLD, HTN, chronic cholecystitis, prior ETOH abuse p/w several episodes of coffee ground emesis, lactic acidosis and new onstet A. Fib and has begun to go into alcohol withdrawal. Pt has been satrted on IV PPI. Will call GI. Pt admitted to tele and consult cardio for     new onset AF. Hold off on AC/ASA due to likely upper GI bleed. Echo read is pending. Will also start CIWA w/ Ativan for alcohol withdrawal. Pt is lying in bed in NAD, lethargic. Will get CT head.

## 2022-10-01 NOTE — H&P ADULT - PROBLEM SELECTOR PROBLEM 6
Hide Topical Anesthesia?: No
Additional Anesthesia Volume In Cc (Will Not Render If 0): 0
Hemostasis: Aluminum Chloride
DM2 (diabetes mellitus, type 2)
Silver Nitrate Text: The wound bed was treated with silver nitrate after the biopsy was performed.
Detail Level: Detailed
Anesthesia Type: 1% lidocaine with epinephrine
Biopsy Method: Dermablade
Notification Instructions: Patient will be notified of biopsy results. However, patient instructed to call the office if not contacted within 2 weeks.
Electrodesiccation Text: The wound bed was treated with electrodesiccation after the biopsy was performed.
Curettage Text: The wound bed was treated with curettage after the biopsy was performed.
Dressing: bandage
Biopsy Type: H and E
Was A Bandage Applied: Yes
Billing Type: Third-Party Bill
Electrodesiccation And Curettage Text: The wound bed was treated with electrodesiccation and curettage after the biopsy was performed.
Wound Care: Vaseline
Type Of Destruction Used: Curettage
Information: Selecting Yes will display possible errors in your note based on the variables you have selected. This validation is only offered as a suggestion for you. PLEASE NOTE THAT THE VALIDATION TEXT WILL BE REMOVED WHEN YOU FINALIZE YOUR NOTE. IF YOU WANT TO FAX A PRELIMINARY NOTE YOU WILL NEED TO TOGGLE THIS TO 'NO' IF YOU DO NOT WANT IT IN YOUR FAXED NOTE.
Consent: Written consent was obtained and risks were reviewed including but not limited to scarring, infection, bleeding, scabbing, incomplete removal, nerve damage and allergy to anesthesia.
Cryotherapy Text: The wound bed was treated with cryotherapy after the biopsy was performed.
Depth Of Biopsy: dermis
Post-Care Instructions: I reviewed with the patient in detail post-care instructions. Patient is to keep the biopsy site dry overnight, and then apply bacitracin twice daily until healed. Patient may apply hydrogen peroxide soaks to remove any crusting.
Anesthesia Volume In Cc: 0.5
Path Notes (To The Dermatopathologist): .

## 2022-10-01 NOTE — CONSULT NOTE ADULT - SUBJECTIVE AND OBJECTIVE BOX
HPI:  This is an 81M hx dm, htn, chronic cholecystitis, prior ETOH abuse quit a few mo ago, presenting due to several episodes of coffee ground emesis per daughter. He also appears more lethargic. Poor historian. on presentation juan j 177/84 and ekg shows new onstet AF 93 bpm. labs significant for normal Hgb 14.5, normal platelets, lactate 2.6, mag 1.5. Ct abd/pelvis unremarkable for acute pathology. denies cp, palpitations, syncope, orthopnea, pnd, h/a, abd pain, diarrhea, hematochezia, melena.      (01 Oct 2022 00:48)  ----- As Above ----- Lethargic, History obtained from chart, daughter  The patient had been in his USOH until yesterday when he was found by his daughter to be very lethargic and had black material all over him. He then vomited more black material. The last time he vomited black material, he had vomited a small amount of black material in the beginning of the year. The  last time he drank was fathers day weekend. The daughter denies all NSAIDs.  The patient has  " Chronic Cholecystitis. " The daughter states that they have been trying to get a doctor to put in a fistula.       PAST MEDICAL & SURGICAL HISTORY:  High blood pressure      Diabetes      High cholesterol          MEDICATIONS  (STANDING):  amLODIPine   Tablet 5 milliGRAM(s) Oral daily  atorvastatin 10 milliGRAM(s) Oral at bedtime  dextrose 5%. 1000 milliLiter(s) (50 mL/Hr) IV Continuous <Continuous>  dextrose 5%. 1000 milliLiter(s) (100 mL/Hr) IV Continuous <Continuous>  dextrose 50% Injectable 25 Gram(s) IV Push once  dextrose 50% Injectable 12.5 Gram(s) IV Push once  dextrose 50% Injectable 25 Gram(s) IV Push once  folic acid 1 milliGRAM(s) Oral daily  glucagon  Injectable 1 milliGRAM(s) IntraMuscular once  hydrochlorothiazide 25 milliGRAM(s) Oral daily  insulin lispro (ADMELOG) corrective regimen sliding scale   SubCutaneous three times a day before meals  losartan 100 milliGRAM(s) Oral daily  multivitamin 1 Tablet(s) Oral daily  pantoprazole  Injectable 40 milliGRAM(s) IV Push two times a day  potassium chloride   Powder 40 milliEquivalent(s) Oral every 4 hours  sodium chloride 0.9% with potassium chloride 20 mEq/L 1000 milliLiter(s) (60 mL/Hr) IV Continuous <Continuous>  thiamine 100 milliGRAM(s) Oral daily    MEDICATIONS  (PRN):  acetaminophen     Tablet .. 650 milliGRAM(s) Oral every 6 hours PRN Temp greater or equal to 38C (100.4F), Mild Pain (1 - 3)  aluminum hydroxide/magnesium hydroxide/simethicone Suspension 30 milliLiter(s) Oral every 4 hours PRN Dyspepsia  dextrose Oral Gel 15 Gram(s) Oral once PRN Blood Glucose LESS THAN 70 milliGRAM(s)/deciliter  LORazepam   Injectable 1 milliGRAM(s) IV Push every 2 hours PRN CIWA-Ar score increase by 2 points and a total score of 7 or less  LORazepam   Injectable 1 milliGRAM(s) IV Push every 1 hour PRN CIWA-Ar score 8 or greater  meclizine 25 milliGRAM(s) Oral four times a day PRN Dizziness  melatonin 3 milliGRAM(s) Oral at bedtime PRN Insomnia  ondansetron Injectable 4 milliGRAM(s) IV Push every 8 hours PRN Nausea and/or Vomiting      Allergies    No Known Allergies    Intolerances        FAMILY HISTORY:  No pertinent family history in first degree relatives        REVIEW OF SYSTEMS: Very lethargic     CONSTITUTIONAL: No fever, weight loss, or fatigue  EYES: No eye pain, visual disturbances, or discharge  ENMT:  No difficulty hearing, tinnitus, vertigo; No sinus or throat pain  NECK: No pain or stiffness  BREASTS: No pain, masses, or nipple discharge  RESPIRATORY: No cough, wheezing, chills or hemoptysis; No shortness of breath  CARDIOVASCULAR: No chest pain, palpitations, dizziness, or leg swelling  GASTROINTESTINAL: See above   GENITOURINARY: No dysuria, frequency, hematuria, or incontinence  NEUROLOGICAL: No headaches,  numbness, or tremors  SKIN: No itching, burning, rashes, or lesions   LYMPH NODES: No enlarged glands  ENDOCRINE: No heat or cold intolerance; No hair loss  MUSCULOSKELETAL: No joint pain or swelling; No muscle, back, or extremity pain  PSYCHIATRIC: No depression, anxiety, mood swings, or difficulty sleeping  HEME/LYMPH: No easy bruising, or bleeding gums  ALLERGY AND IMMUNOLOGIC: No hives or eczema          SOCIAL HISTORY:    FAMILY HISTORY:  No pertinent family history in first degree relatives        Vital Signs Last 24 Hrs  T(C): 36.4 (01 Oct 2022 15:50), Max: 38.5 (01 Oct 2022 03:12)  T(F): 97.6 (01 Oct 2022 15:50), Max: 101.3 (01 Oct 2022 03:12)  HR: 88 (01 Oct 2022 15:50) (70 - 109)  BP: 158/94 (01 Oct 2022 15:50) (106/79 - 200/83)  BP(mean): --  RR: 20 (01 Oct 2022 15:50) (17 - 21)  SpO2: 93% (01 Oct 2022 15:50) (93% - 100%)    Parameters below as of 01 Oct 2022 15:50  Patient On (Oxygen Delivery Method): room air        PHYSICAL EXAM:    GENERAL: NAD, well-groomed, well-developed  HEAD:  Atraumatic, Normocephalic  EYES: EOMI, PERRLA, conjunctiva and sclera clear  ENMT: No tonsillar erythema, exudates, or enlargement; Moist mucous membranes, Good dentition, No lesions  NECK: Supple, No JVD, Normal thyroid  NERVOUS SYSTEM:  Alert & Oriented X3, Good concentration; Motor Strength 5/5 B/L upper and lower extremities; DTRs 2+ intact and symmetric  CHEST/LUNG: Clear to percussion bilaterally; No rales, rhonchi, wheezing, or rubs  HEART: Regular rate and rhythm; No murmurs, rubs, or gallops  ABDOMEN: Soft, Nontender, Nondistended; Bowel sounds present  EXTREMITIES:  2+ Peripheral Pulses, No clubbing, cyanosis, or edema  LYMPH: No lymphadenopathy noted   RECTAL: No masses, prostate normal size and smooth, Guaiaci negative   BREAST: No palpable masses, skin no lesions no retractions, no discharges. adnexal no palpable masses noted   GYN: uterus normal size, adnexal, no palpable masses, no CMT, no uterine discharge   SKIN: No rashes or lesions    LABS:                        14.1   9.58  )-----------( 231      ( 01 Oct 2022 08:00 )             40.6       CBC:  10-01 @ 08:00  WBC  9.58  HGB 14.1  HCT 40.6 Plate 231  .2  10-01 @ 01:08  WBC  8.39  HGB 14.2  HCT 41.0 Plate 220  MCV 98.3  09-30 @ 20:25  WBC  8.41  HGB 14.5  HCT 41.7 Plate 234  MCV 97.7           01 Oct 2022 08:00    136    |  102    |  8      ----------------------------<  116    3.3     |  26     |  1.00   30 Sep 2022 20:25    138    |  102    |  7      ----------------------------<  176    3.8     |  26     |  1.07     Ca    9.2        01 Oct 2022 08:00  Ca    9.6        30 Sep 2022 20:25  Phos  2.9       01 Oct 2022 08:00  Mg     2.2       01 Oct 2022 08:00  Mg     1.5       30 Sep 2022 20:25    TPro  8.5    /  Alb  3.5    /  TBili  0.9    /  DBili  x      /  AST  13     /  ALT  15     /  AlkPhos  83     01 Oct 2022 08:00  TPro  8.3    /  Alb  3.5    /  TBili  0.8    /  DBili  x      /  AST  11     /  ALT  16     /  AlkPhos  84     30 Sep 2022 20:25    PT/INR - ( 30 Sep 2022 20:25 )   PT: 12.3 sec;   INR: 1.03 ratio         PTT - ( 30 Sep 2022 20:25 )  PTT:28.2 sec  Urinalysis Basic - ( 01 Oct 2022 01:08 )    Color: Pale Yellow / Appearance: Clear / S.005 / pH: x  Gluc: x / Ketone: Trace  / Bili: Negative / Urobili: Negative mg/dL   Blood: x / Protein: 15 mg/dL / Nitrite: Positive   Leuk Esterase: Trace / RBC: 0-2 /HPF / WBC 0-2   Sq Epi: x / Non Sq Epi: Occasional / Bacteria: Negative          RADIOLOGY & ADDITIONAL STUDIES: HPI:  This is an 81M hx dm, htn, chronic cholecystitis, prior ETOH abuse quit a few mo ago, presenting due to several episodes of coffee ground emesis per daughter. He also appears more lethargic. Poor historian. on presentation juan j 177/84 and ekg shows new onstet AF 93 bpm. labs significant for normal Hgb 14.5, normal platelets, lactate 2.6, mag 1.5. Ct abd/pelvis unremarkable for acute pathology. denies cp, palpitations, syncope, orthopnea, pnd, h/a, abd pain, diarrhea, hematochezia, melena.      (01 Oct 2022 00:48)  ----- As Above ----- Lethargic, History obtained from chart, daughter  The patient had been in his USOH until yesterday when he was found by his daughter to be very lethargic and had black material all over him. He then vomited more black material. The last time he vomited black material, he had vomited a small amount of black material in the beginning of the year. The  last time he drank was fathers day weekend. The daughter denies all NSAIDs.  The patient has  " Chronic Cholecystitis. " The daughter states that they have been trying to get a doctor to put in a fistula.       PAST MEDICAL & SURGICAL HISTORY:  High blood pressure      Diabetes      High cholesterol          MEDICATIONS  (STANDING):  amLODIPine   Tablet 5 milliGRAM(s) Oral daily  atorvastatin 10 milliGRAM(s) Oral at bedtime  dextrose 5%. 1000 milliLiter(s) (50 mL/Hr) IV Continuous <Continuous>  dextrose 5%. 1000 milliLiter(s) (100 mL/Hr) IV Continuous <Continuous>  dextrose 50% Injectable 25 Gram(s) IV Push once  dextrose 50% Injectable 12.5 Gram(s) IV Push once  dextrose 50% Injectable 25 Gram(s) IV Push once  folic acid 1 milliGRAM(s) Oral daily  glucagon  Injectable 1 milliGRAM(s) IntraMuscular once  hydrochlorothiazide 25 milliGRAM(s) Oral daily  insulin lispro (ADMELOG) corrective regimen sliding scale   SubCutaneous three times a day before meals  losartan 100 milliGRAM(s) Oral daily  multivitamin 1 Tablet(s) Oral daily  pantoprazole  Injectable 40 milliGRAM(s) IV Push two times a day  potassium chloride   Powder 40 milliEquivalent(s) Oral every 4 hours  sodium chloride 0.9% with potassium chloride 20 mEq/L 1000 milliLiter(s) (60 mL/Hr) IV Continuous <Continuous>  thiamine 100 milliGRAM(s) Oral daily    MEDICATIONS  (PRN):  acetaminophen     Tablet .. 650 milliGRAM(s) Oral every 6 hours PRN Temp greater or equal to 38C (100.4F), Mild Pain (1 - 3)  aluminum hydroxide/magnesium hydroxide/simethicone Suspension 30 milliLiter(s) Oral every 4 hours PRN Dyspepsia  dextrose Oral Gel 15 Gram(s) Oral once PRN Blood Glucose LESS THAN 70 milliGRAM(s)/deciliter  LORazepam   Injectable 1 milliGRAM(s) IV Push every 2 hours PRN CIWA-Ar score increase by 2 points and a total score of 7 or less  LORazepam   Injectable 1 milliGRAM(s) IV Push every 1 hour PRN CIWA-Ar score 8 or greater  meclizine 25 milliGRAM(s) Oral four times a day PRN Dizziness  melatonin 3 milliGRAM(s) Oral at bedtime PRN Insomnia  ondansetron Injectable 4 milliGRAM(s) IV Push every 8 hours PRN Nausea and/or Vomiting      Allergies    No Known Allergies    Intolerances        FAMILY HISTORY:  No pertinent family history in first degree relatives        REVIEW OF SYSTEMS: Very lethargic     CONSTITUTIONAL: No weight loss,   EYES: No eye pain, visual disturbances, or discharge  ENMT:  No difficulty hearing, tinnitus, vertigo; No sinus or throat pain  NECK: No pain or stiffness  BREASTS: No pain, masses, or nipple discharge  RESPIRATORY: No cough, wheezing, chills or hemoptysis; No shortness of breath  CARDIOVASCULAR: No chest pain, palpitations, dizziness, or leg swelling  GASTROINTESTINAL: See above   GENITOURINARY: No dysuria, frequency, hematuria, or incontinence  NEUROLOGICAL: No headaches,  numbness, or tremors  SKIN: No itching, burning, rashes, or lesions   LYMPH NODES: No enlarged glands  ENDOCRINE: No heat or cold intolerance; No hair loss  MUSCULOSKELETAL: No joint pain or swelling; No muscle, back, or extremity pain  PSYCHIATRIC: No depression, anxiety, mood swings, or difficulty sleeping  HEME/LYMPH: No easy bruising, or bleeding gums  ALLERGY AND IMMUNOLOGIC: No hives or eczema          SOCIAL HISTORY:    FAMILY HISTORY:  No pertinent family history in first degree relatives        Vital Signs Last 24 Hrs  T(C): 36.4 (01 Oct 2022 15:50), Max: 38.5 (01 Oct 2022 03:12)  T(F): 97.6 (01 Oct 2022 15:50), Max: 101.3 (01 Oct 2022 03:12)  HR: 88 (01 Oct 2022 15:50) (70 - 109)  BP: 158/94 (01 Oct 2022 15:50) (106/79 - 200/83)  BP(mean): --  RR: 20 (01 Oct 2022 15:50) (17 - 21)  SpO2: 93% (01 Oct 2022 15:50) (93% - 100%)    Parameters below as of 01 Oct 2022 15:50  Patient On (Oxygen Delivery Method): room air        PHYSICAL EXAM:    GENERAL: NAD, well-groomed, well-developed  HEAD:  Atraumatic, Normocephalic  EYES: EOMI, PERRLA, conjunctiva and sclera clear  NECK: Supple, No JVD, Normal thyroid  NERVOUS SYSTEM:  Very lethargic  CHEST/LUNG: Clear to percussion bilaterally; No rales, rhonchi, wheezing, or rubs  HEART: Regular rate and rhythm; No murmurs, rubs, or gallops  ABDOMEN: Soft, Nontender, Nondistended; Bowel sounds present  EXTREMITIES:  2+ Peripheral Pulses, No clubbing, cyanosis, or edema  LYMPH: No lymphadenopathy noted   RECTAL:  Deferred   SKIN: No rashes or lesions    LABS:                        14.1   9.58  )-----------( 231      ( 01 Oct 2022 08:00 )             40.6       CBC:  10-01 @ 08:00  WBC  9.58  HGB 14.1  HCT 40.6 Plate 231  .2  10-01 @ 01:08  WBC  8.39  HGB 14.2  HCT 41.0 Plate 220  MCV 98.3   @ 20:25  WBC  8.41  HGB 14.5  HCT 41.7 Plate 234  MCV 97.7           01 Oct 2022 08:00    136    |  102    |  8      ----------------------------<  116    3.3     |  26     |  1.00   30 Sep 2022 20:25    138    |  102    |  7      ----------------------------<  176    3.8     |  26     |  1.07     Ca    9.2        01 Oct 2022 08:00  Ca    9.6        30 Sep 2022 20:25  Phos  2.9       01 Oct 2022 08:00  Mg     2.2       01 Oct 2022 08:00  Mg     1.5       30 Sep 2022 20:25    TPro  8.5    /  Alb  3.5    /  TBili  0.9    /  DBili  x      /  AST  13     /  ALT  15     /  AlkPhos  83     01 Oct 2022 08:00  TPro  8.3    /  Alb  3.5    /  TBili  0.8    /  DBili  x      /  AST  11     /  ALT  16     /  AlkPhos  84     30 Sep 2022 20:25    PT/INR - ( 30 Sep 2022 20:25 )   PT: 12.3 sec;   INR: 1.03 ratio         PTT - ( 30 Sep 2022 20:25 )  PTT:28.2 sec  Urinalysis Basic - ( 01 Oct 2022 01:08 )    Color: Pale Yellow / Appearance: Clear / S.005 / pH: x  Gluc: x / Ketone: Trace  / Bili: Negative / Urobili: Negative mg/dL   Blood: x / Protein: 15 mg/dL / Nitrite: Positive   Leuk Esterase: Trace / RBC: 0-2 /HPF / WBC 0-2   Sq Epi: x / Non Sq Epi: Occasional / Bacteria: Negative          RADIOLOGY & ADDITIONAL STUDIES:  < from: CT Abdomen and Pelvis w/ IV Cont (22 @ 23:05) >    ACC: [id number] EXAM:  CT ABDOMEN AND PELVIS IC                          PROCEDURE DATE:  2022          INTERPRETATION:  CLINICAL INFORMATION: Abdominal pain    COMPARISON: CT chest abdomen pelvis 2019. Renal ultrasound 2019.    CONTRAST/COMPLICATIONS:  IV Contrast: Omnipaque 350  97 cc administered   03 cc discarded  Oral Contrast: NONE  Complications: None reported at time of study completion    PROCEDURE:  CT of the Abdomen and Pelvis was performed.  Sagittal and coronal reformats were performed.    FINDINGS:  LOWER CHEST: Within normal limits.    LIVER: Within normal limits.  BILE DUCTS: Normal caliber.  GALLBLADDER: Contracted limiting evaluation.  SPLEEN: Within normal limits.  PANCREAS: Within normal limits.  ADRENALS: Small right renal calcifications are unchanged.  KIDNEYS/URETERS: 5.5 cm left renal exophytic mildly complex cyst   unchanged from previous imaging.    BLADDER: Urinary bladder is collapsed limiting evaluation however the   bladder wall appears thickened. Correlate clinically UTI/bladder   pathology.  REPRODUCTIVE ORGANS: Prostate within normal limits.    BOWEL: Small hiatal hernia. No bowel obstruction. Appendix is normal.   Colonic diverticulosis without diverticulitis.  PERITONEUM: No ascites.  VESSELS: Atherosclerotic changes.  RETROPERITONEUM/LYMPH NODES: No lymphadenopathy.  ABDOMINAL WALL: Tiny fat-containing umbilical hernia. Small   fat-containing inguinal hernias.  BONES: Degenerative changes.    IMPRESSION:    Urinary bladder is collapsed limiting evaluation however the bladder wall   appears thickened. Correlate clinically UTI/bladder pathology.      --- End of Report ---            MARINO JUAN MD; Attending Radiologist  This document has been electronically signed. Sep 30 2022 11:46PM    < end of copied text >

## 2022-10-01 NOTE — H&P ADULT - HISTORY OF PRESENT ILLNESS
This is an 81M hx dm, htn, chronic cholecystitis, prior ETOH abuse quit a few mo ago, presenting due to several episodes of coffee ground emesis per daughter. He also appears more lethargic. Poor historian. on presentation juan j 177/84 and ekg shows new onstet AF 93 bpm. labs significant for normal Hgb 14.5, normal platelets, lactate 2.6, mag 1.5. Ct abd/pelvis unremarkable for acute pathology. denies cp, palpitations, syncope, orthopnea, pnd, h/a, abd pain, diarrhea, hematochezia, melena.

## 2022-10-01 NOTE — H&P ADULT - NSHPPHYSICALEXAM_GEN_ALL_CORE
Constitutional: NAD AAO x 3  HEENT PERRLA EOMI  CV irregularly irregular S1S2  Pulm cta b/l   GI soft nontender nondistended + BS   Neuro CN II-XII grossly intact   Extremities no edema or calf tenderness

## 2022-10-01 NOTE — H&P ADULT - ASSESSMENT
This is an 81M hx dm, htn, chronic cholecystitis, prior ETOH abuse quit a few mo ago, presenting due to several episodes of coffee ground emesis per daughter. He also appears more lethargic. Poor historian. on presentation juan j 177/84 and ekg shows new onstet AF 93 bpm. labs significant for normal Hgb 14.5, normal platelets, lactate 2.6, mag 1.5. Ct abd/pelvis unremarkable for acute pathology. denies cp, palpitations, syncope, orthopnea, pnd, h/a, abd pain, diarrhea, hematochezia, melena.     coffee ground emesis r/u uGIB  new onset AF  hypomagnesemia   lactic acidosis   past etoh abuse   htn   dm   -ppi iv 40 mg bid, IVF  -npo, gi consult   -monitor h/h  -rate controlled af. chadsvasc 4 however has possible gib. no ac at this time   -tte, tsh, lipid panel, a1c  -telemetry  -replete lytes prn  -iss bgm  -scds

## 2022-10-01 NOTE — PATIENT PROFILE ADULT - FALL HARM RISK - HARM RISK INTERVENTIONS
Assistance with ambulation/Assistance OOB with selected safe patient handling equipment/Communicate Risk of Fall with Harm to all staff/Discuss with provider need for PT consult/Monitor gait and stability/Reinforce activity limits and safety measures with patient and family/Sit up slowly, dangle for a short time, stand at bedside before walking/Tailored Fall Risk Interventions/Visual Cue: Yellow wristband and red socks/Bed in lowest position, wheels locked, appropriate side rails in place/Call bell, personal items and telephone in reach/Instruct patient to call for assistance before getting out of bed or chair/Non-slip footwear when patient is out of bed/[place] to call system/Physically safe environment - no spills, clutter or unnecessary equipment/Purposeful Proactive Rounding/Room/bathroom lighting operational, light cord in reach

## 2022-10-02 LAB
ANION GAP SERPL CALC-SCNC: 10 MMOL/L — SIGNIFICANT CHANGE UP (ref 5–17)
BUN SERPL-MCNC: 7 MG/DL — SIGNIFICANT CHANGE UP (ref 7–23)
CALCIUM SERPL-MCNC: 8.9 MG/DL — SIGNIFICANT CHANGE UP (ref 8.5–10.1)
CHLORIDE SERPL-SCNC: 101 MMOL/L — SIGNIFICANT CHANGE UP (ref 96–108)
CO2 SERPL-SCNC: 24 MMOL/L — SIGNIFICANT CHANGE UP (ref 22–31)
CREAT SERPL-MCNC: 0.9 MG/DL — SIGNIFICANT CHANGE UP (ref 0.5–1.3)
CULTURE RESULTS: NO GROWTH — SIGNIFICANT CHANGE UP
EGFR: 86 ML/MIN/1.73M2 — SIGNIFICANT CHANGE UP
GLUCOSE BLDC GLUCOMTR-MCNC: 113 MG/DL — HIGH (ref 70–99)
GLUCOSE BLDC GLUCOMTR-MCNC: 117 MG/DL — HIGH (ref 70–99)
GLUCOSE BLDC GLUCOMTR-MCNC: 123 MG/DL — HIGH (ref 70–99)
GLUCOSE BLDC GLUCOMTR-MCNC: 126 MG/DL — HIGH (ref 70–99)
GLUCOSE BLDC GLUCOMTR-MCNC: 131 MG/DL — HIGH (ref 70–99)
GLUCOSE BLDC GLUCOMTR-MCNC: 134 MG/DL — HIGH (ref 70–99)
GLUCOSE SERPL-MCNC: 122 MG/DL — HIGH (ref 70–99)
HCT VFR BLD CALC: 39.3 % — SIGNIFICANT CHANGE UP (ref 39–50)
HGB BLD-MCNC: 13.5 G/DL — SIGNIFICANT CHANGE UP (ref 13–17)
LACTATE SERPL-SCNC: 1.1 MMOL/L — SIGNIFICANT CHANGE UP (ref 0.7–2)
MAGNESIUM SERPL-MCNC: 1.8 MG/DL — SIGNIFICANT CHANGE UP (ref 1.6–2.6)
MCHC RBC-ENTMCNC: 32.9 PG — SIGNIFICANT CHANGE UP (ref 27–34)
MCHC RBC-ENTMCNC: 34.4 G/DL — SIGNIFICANT CHANGE UP (ref 32–36)
MCV RBC AUTO: 95.9 FL — SIGNIFICANT CHANGE UP (ref 80–100)
NRBC # BLD: 0 /100 WBCS — SIGNIFICANT CHANGE UP (ref 0–0)
PHOSPHATE SERPL-MCNC: 3 MG/DL — SIGNIFICANT CHANGE UP (ref 2.5–4.5)
PLATELET # BLD AUTO: 224 K/UL — SIGNIFICANT CHANGE UP (ref 150–400)
POTASSIUM SERPL-MCNC: 3.3 MMOL/L — LOW (ref 3.5–5.3)
POTASSIUM SERPL-SCNC: 3.3 MMOL/L — LOW (ref 3.5–5.3)
RBC # BLD: 4.1 M/UL — LOW (ref 4.2–5.8)
RBC # FLD: 13.1 % — SIGNIFICANT CHANGE UP (ref 10.3–14.5)
SODIUM SERPL-SCNC: 135 MMOL/L — SIGNIFICANT CHANGE UP (ref 135–145)
SPECIMEN SOURCE: SIGNIFICANT CHANGE UP
WBC # BLD: 6.82 K/UL — SIGNIFICANT CHANGE UP (ref 3.8–10.5)
WBC # FLD AUTO: 6.82 K/UL — SIGNIFICANT CHANGE UP (ref 3.8–10.5)

## 2022-10-02 PROCEDURE — 76775 US EXAM ABDO BACK WALL LIM: CPT | Mod: 26

## 2022-10-02 PROCEDURE — 99233 SBSQ HOSP IP/OBS HIGH 50: CPT

## 2022-10-02 RX ORDER — POTASSIUM CHLORIDE 20 MEQ
40 PACKET (EA) ORAL EVERY 4 HOURS
Refills: 0 | Status: COMPLETED | OUTPATIENT
Start: 2022-10-02 | End: 2022-10-02

## 2022-10-02 RX ADMIN — Medication 100 MILLIGRAM(S): at 12:01

## 2022-10-02 RX ADMIN — Medication 1 MILLIGRAM(S): at 12:01

## 2022-10-02 RX ADMIN — PANTOPRAZOLE SODIUM 40 MILLIGRAM(S): 20 TABLET, DELAYED RELEASE ORAL at 17:29

## 2022-10-02 RX ADMIN — Medication 1 MILLIGRAM(S): at 05:13

## 2022-10-02 RX ADMIN — DEXTROSE MONOHYDRATE, SODIUM CHLORIDE, AND POTASSIUM CHLORIDE 60 MILLILITER(S): 50; .745; 4.5 INJECTION, SOLUTION INTRAVENOUS at 05:16

## 2022-10-02 RX ADMIN — Medication 100 MILLIEQUIVALENT(S): at 00:34

## 2022-10-02 RX ADMIN — DEXTROSE MONOHYDRATE, SODIUM CHLORIDE, AND POTASSIUM CHLORIDE 60 MILLILITER(S): 50; .745; 4.5 INJECTION, SOLUTION INTRAVENOUS at 21:30

## 2022-10-02 RX ADMIN — Medication 1 TABLET(S): at 12:02

## 2022-10-02 RX ADMIN — Medication 40 MILLIEQUIVALENT(S): at 15:37

## 2022-10-02 RX ADMIN — Medication 1 DROP(S): at 21:31

## 2022-10-02 RX ADMIN — DEXTROSE MONOHYDRATE, SODIUM CHLORIDE, AND POTASSIUM CHLORIDE 60 MILLILITER(S): 50; .745; 4.5 INJECTION, SOLUTION INTRAVENOUS at 17:28

## 2022-10-02 RX ADMIN — PANTOPRAZOLE SODIUM 40 MILLIGRAM(S): 20 TABLET, DELAYED RELEASE ORAL at 05:14

## 2022-10-02 RX ADMIN — Medication 40 MILLIEQUIVALENT(S): at 17:29

## 2022-10-02 RX ADMIN — ATORVASTATIN CALCIUM 10 MILLIGRAM(S): 80 TABLET, FILM COATED ORAL at 21:31

## 2022-10-02 NOTE — PROGRESS NOTE ADULT - ASSESSMENT
81M hx DM, HLD, HTN, chronic cholecystitis, prior ETOH abuse p/w several episodes of coffee ground emesis, lactic acidosis and new onstet A. Fib.     Suspected UGIB  - c/w IV Protonix   - clear liquids for now   - GI plans on EGD   - monitor H&H    hypokalemia  - replace w/ KCl    A. fib  - may be new onset- daughter reports no history of A. fib  - c/w tele monitoring - patient had a 3.13 sec pause on monitor today  - cardio note read and appreciated   - no AC as patient had suspected UGIB  - Echo showed EF 60-65%, mildly enlarged left atrium & trace mitral valve regurgitation  - TSH is 0.557    Lethargy  - resolved  - CT head showed a chronic lacunar infarct in the lateral aspect of the right lentiform nucleus but no gross CT evidence of intracranial pathology    Confusion  - suspect dementia - daughter notes that the patient has been progressively more forgetful  - CT head showed a chronic lacunar infarct in the lateral aspect of the right lentiform nucleus but no gross CT evidence of intracranial pathology    Tremors  - unlikely to be alcohol withdrawal - daughter says patient has tremors at baseline      lactic acidosis   - resolved    5.5 cm left renal exophytic mildly complex cyst  - unchanged from previous imaging on 8/20/2019  - will get US    HTN  - c/w Norvasc, Losartan and HCTX    HLD  - c/w Lipitor     DM II  - hgb A1C is 6.4  - c/w ISS     Prophylaxis:  DVT: SCD  GI: Protonix

## 2022-10-02 NOTE — CONSULT NOTE ADULT - SUBJECTIVE AND OBJECTIVE BOX
CHIEF COMPLAINT:  Patient is a 81y old  Male who presents with a chief complaint of gi bleed    HPI:  This is an 81M hx dm, htn, chronic cholecystitis, prior ETOH abuse quit a few mo ago, presenting due to several episodes of coffee ground emesis per daughter. He also appears more lethargic. Poor historian. on presentation juan j 177/84 and ekg shows new onstet AF 93 bpm. labs significant for normal Hgb 14.5, normal platelets, lactate 2.6, mag 1.5. Ct abd/pelvis unremarkable for acute pathology. denies cp, palpitations, syncope, orthopnea, pnd, h/a, abd pain, diarrhea, hematochezia, melena.       ALLERGIES:  No Known Allergies    Home Medications:  amLODIPine 5 mg oral tablet: 1 tab(s) orally once a day (22 Aug 2019 14:46)  Artificial Tears ophthalmic solution: 1 drop(s) to each affected eye 4 times a day (02 Oct 2022 07:44)  atorvastatin 10 mg oral tablet: 1 tab(s) orally once a day (at bedtime) (22 Aug 2019 14:46)  Flonase 50 mcg/inh nasal spray: 1 spray(s) nasal once a day (02 Oct 2022 07:45)  indomethacin 75 mg oral capsule, extended release: 1 cap(s) orally once a day (02 Oct 2022 07:43)  losartan-hydroCHLOROthiazide 100 mg-25 mg oral tablet: 1 tab(s) orally once a day (20 Aug 2019 15:45)  meclizine 25 mg oral tablet: 1 tab(s) orally 2 times a day (20 Aug 2019 15:45)  metFORMIN: 500  orally 2 times a day (20 Aug 2019 15:45)  omeprazole 40 mg oral delayed release capsule: 1 cap(s) orally once a day (02 Oct 2022 07:42)  primidone 50 mg oral tablet: 50 milligram(s) orally once a day (02 Oct 2022 07:42)    PAST MEDICAL & SURGICAL HISTORY:  High blood pressure      Diabetes      High cholesterol            FAMILY HISTORY:  No pertinent family history in first degree relatives        SOCIAL HISTORY:    REVIEW OF SYSTEMS:  General:  No wt loss, fevers, chills, night sweats  Eyes:  Good vision, no reported pain  ENT:  No sore throat, pain, runny nose, dysphagia  CV:  No pain, palpitations, hypo/hypertension  Resp:  No dyspnea, cough, tachypnea, wheezing  GI:  No pain, nausea, vomiting, diarrhea, constipation  :  No pain, bleeding, incontinence, nocturia  Muscle:  No pain, weakness  Breast:  No pain, abscess, mass, discharge  Neuro:  No weakness, tingling, memory problems  Psych:  No fatigue, insomnia, mood problems, depression  Endocrine:  No polyuria, polydipsia, cold/heat intolerance  Heme:  No petechiae, ecchymosis, easy bruisability  Skin:  No rash, edema    PHYSICAL EXAM:  Vital Signs:  Vital Signs Last 24 Hrs  T(C): 37 (02 Oct 2022 04:24), Max: 37 (02 Oct 2022 04:24)  T(F): 98.6 (02 Oct 2022 04:24), Max: 98.6 (02 Oct 2022 04:24)  HR: 75 (02 Oct 2022 04:24) (70 - 100)  BP: 145/77 (02 Oct 2022 04:24) (145/77 - 160/85)  RR: 19 (02 Oct 2022 04:24) (18 - 20)  SpO2: 94% (02 Oct 2022 04:24) (93% - 96%)    Parameters below as of 01 Oct 2022 15:50  Patient On (Oxygen Delivery Method): room air      Tele:     Constitutional: well developed, normal appearance, well groomed, well nourished, no deformities and no acute distress.   Eyes: the conjunctiva exhibited no abnormalities and the eyelids demonstrated no xanthelasmas.   HEENT: normal oral mucosa, no oral pallor and no oral cyanosis.   Neck: normal jugular venous A waves present, normal jugular venous V waves present and no jugular venous mueller A waves.   Pulmonary: no respiratory distress, normal respiratory rhythm and effort, no accessory muscle use and lungs were clear to auscultation bilaterally.   Cardiovascular: heart rate and rhythm were normal, normal S1 and S2 and no murmur, gallop, rub, heave or thrill are present.   Abdomen: soft, non-tender, no hepato-splenomegaly and no abdominal mass palpated.   Musculoskeletal: the gait could not be assessed..   Extremities: no clubbing of the fingernails, no localized cyanosis, no petechial hemorrhages and no ischemic changes.   Skin: normal skin color and pigmentation, no rash, no venous stasis, no skin lesions, no skin ulcer and no xanthoma was observed.   Psychiatric: oriented to person, place, and time, the affect was normal, the mood was normal and not feeling anxious.      LABORATORY:                          13.5   6.82  )-----------( 224      ( 02 Oct 2022 06:55 )             39.3     10    135  |  101  |  7   ----------------------------<  122<H>  3.3<L>   |  24  |  0.90    Ca    8.9      02 Oct 2022 06:55  Phos  3.0     10  Mg     1.8     10-02    TPro  8.5<H>  /  Alb  3.5  /  TBili  0.9  /  DBili  x   /  AST  13<L>  /  ALT  15  /  AlkPhos  83  10    ABG - ( 01 Oct 2022 22:46 )  pH, Arterial: 7.48  pH, Blood: x     /  pCO2: 35    /  pO2: 93    / HCO3: 26    / Base Excess: 2.8   /  SaO2: 98.9        CAPILLARY BLOOD GLUCOSE  POCT Blood Glucose.: 131 mg/dL (02 Oct 2022 00:55)  POCT Blood Glucose.: 139 mg/dL (01 Oct 2022 17:00)    LIVER FUNCTIONS - ( 01 Oct 2022 08:00 )  Alb: 3.5 g/dL / Pro: 8.5 gm/dL / ALK PHOS: 83 U/L / ALT: 15 U/L / AST: 13 U/L / GGT: x           PT/INR - ( 30 Sep 2022 20:25 )   PT: 12.3 sec;   INR: 1.03 ratio         PTT - ( 30 Sep 2022 20:25 )  PTT:28.2 sec  Urinalysis Basic - ( 01 Oct 2022 01:08 )    Color: Pale Yellow / Appearance: Clear / S.005 / pH: x  Gluc: x / Ketone: Trace  / Bili: Negative / Urobili: Negative mg/dL   Blood: x / Protein: 15 mg/dL / Nitrite: Positive   Leuk Esterase: Trace / RBC: 0-2 /HPF / WBC 0-2   Sq Epi: x / Non Sq Epi: Occasional / Bacteria: Negative      IMAGING:  < from: CT Head No Cont (10.01.22 @ 20:06) >  Motion degraded.  No gross CT evidence of intracranial pathology.    < end of copied text >    < from: CT Abdomen and Pelvis w/ IV Cont (22 @ 23:05) >  Urinary bladder is collapsed limiting evaluation however the bladder wall   appears thickened. Correlate clinically UTI/bladder pathology.    < end of copied text >    ASSESSMENT:  This is an 81M hx dm, htn, chronic cholecystitis, prior ETOH abuse quit a few mo ago, presenting due to several episodes of coffee ground emesis per daughter. He also appears more lethargic. Poor historian. on presentation juan j 177/84 and ekg shows new onstet AF 93 bpm. labs significant for normal Hgb 14.5, normal platelets, lactate 2.6, mag 1.5. Ct abd/pelvis unremarkable for acute pathology. denies cp, palpitations, syncope, orthopnea, pnd, h/a, abd pain, diarrhea, hematochezia, melena.       PLAN:       amLODIPine   Tablet 5 milliGRAM(s) Oral daily  atorvastatin 10 milliGRAM(s) Oral at bedtime  folic acid 1 milliGRAM(s) Oral daily  glucagon  Injectable 1 milliGRAM(s) IntraMuscular once  hydrochlorothiazide 25 milliGRAM(s) Oral daily  influenza  Vaccine (HIGH DOSE) 0.7 milliLiter(s) IntraMuscular once  insulin lispro (ADMELOG) corrective regimen sliding scale   SubCutaneous three times a day before meals  losartan 100 milliGRAM(s) Oral daily  multivitamin 1 Tablet(s) Oral daily  pantoprazole  Injectable 40 milliGRAM(s) IV Push two times a day  sodium chloride 0.9% with potassium chloride 20 mEq/L 1000 milliLiter(s) (60 mL/Hr) IV Continuous <Continuous>  thiamine 100 milliGRAM(s) Oral daily    Prelim note    Jd Sykes MD, FACC, FASE, FASNC, FACP  Director, Heart Failure Services  [facility]  , Department of Cardiology  [facility]     CHIEF COMPLAINT:  Patient is a 81y old  Male who presents with a chief complaint of gi bleed    HPI:  81-year-old with hypertension, diabetes, chronic cholecystitis, history of alcohol abuse quit a few months back, admitted with coffee-ground emesis, lethargy and apparently is unable communicate today as he appears obtunded.  Seen to be in newly recognized atrial fibrillation.  He is seen resting comfortably in bed earlier today in no acute distress.  Plan is for an EGD and gastric work-up.  Baseline tremor also noted. Discussed with patient's adult daughter this morning at bedside.    ALLERGIES:  No Known Allergies    Home Medications:  amLODIPine 5 mg oral tablet: 1 tab(s) orally once a day (22 Aug 2019 14:46)  Artificial Tears ophthalmic solution: 1 drop(s) to each affected eye 4 times a day (02 Oct 2022 07:44)  atorvastatin 10 mg oral tablet: 1 tab(s) orally once a day (at bedtime) (22 Aug 2019 14:46)  Flonase 50 mcg/inh nasal spray: 1 spray(s) nasal once a day (02 Oct 2022 07:45)  indomethacin 75 mg oral capsule, extended release: 1 cap(s) orally once a day (02 Oct 2022 07:43)  losartan-hydroCHLOROthiazide 100 mg-25 mg oral tablet: 1 tab(s) orally once a day (20 Aug 2019 15:45)  meclizine 25 mg oral tablet: 1 tab(s) orally 2 times a day (20 Aug 2019 15:45)  metFORMIN: 500  orally 2 times a day (20 Aug 2019 15:45)  omeprazole 40 mg oral delayed release capsule: 1 cap(s) orally once a day (02 Oct 2022 07:42)  primidone 50 mg oral tablet: 50 milligram(s) orally once a day (02 Oct 2022 07:42)    PAST MEDICAL & SURGICAL HISTORY:  High blood pressure      Diabetes      High cholesterol            FAMILY HISTORY:  No pertinent family history in first degree relatives        SOCIAL HISTORY:  nonsmoker    REVIEW OF SYSTEMS:  General:  No wt loss, fevers, chills, night sweats  Eyes:  Good vision, no reported pain  ENT:  No sore throat, pain, runny nose, dysphagia  CV:  No pain, palpitations, hypo/hypertension  Resp:  No dyspnea, cough, tachypnea, wheezing  GI:  No pain, nausea, vomiting, diarrhea, constipation  :  No pain, bleeding, incontinence, nocturia  Muscle:  No pain, weakness  Breast:  No pain, abscess, mass, discharge  Neuro:  No weakness, tingling, memory problems  Psych:  No fatigue, insomnia, mood problems, depression  Endocrine:  No polyuria, polydipsia, cold/heat intolerance  Heme:  No petechiae, ecchymosis, easy bruisability  Skin:  No rash, edema    PHYSICAL EXAM:  Vital Signs:  Vital Signs Last 24 Hrs  T(C): 37 (02 Oct 2022 04:24), Max: 37 (02 Oct 2022 04:24)  T(F): 98.6 (02 Oct 2022 04:24), Max: 98.6 (02 Oct 2022 04:24)  HR: 75 (02 Oct 2022 04:24) (70 - 100)  BP: 145/77 (02 Oct 2022 04:24) (145/77 - 160/85)  RR: 19 (02 Oct 2022 04:24) (18 - 20)  SpO2: 94% (02 Oct 2022 04:24) (93% - 96%)    Parameters below as of 01 Oct 2022 15:50  Patient On (Oxygen Delivery Method): room air      Tele: Atrial fibrillation with periods of slow ventricular response.  3-second pause noted today.    Constitutional: well developed, normal appearance, well groomed, well nourished, no deformities and no acute distress.   Eyes: the conjunctiva exhibited no abnormalities and the eyelids demonstrated no xanthelasmas.   HEENT: normal oral mucosa, no oral pallor and no oral cyanosis.   Neck: normal jugular venous A waves present, normal jugular venous V waves present and no jugular venous mueller A waves.   Pulmonary: no respiratory distress, normal respiratory rhythm and effort, no accessory muscle use and lungs were clear to auscultation bilaterally.   Cardiovascular: heart rate and rhythm were normal, normal S1 and S2 and no murmur, gallop, rub, heave or thrill are present.   Abdomen: soft, non-tender.  Musculoskeletal: the gait could not be assessed.  Extremities: no clubbing of the fingernails, no localized cyanosis, no petechial hemorrhages and no ischemic changes.   Skin: normal skin color and pigmentation, no rash, no venous stasis, no skin lesions, no skin ulcer and no xanthoma was observed.       LABORATORY:                          13.5   6.82  )-----------( 224      ( 02 Oct 2022 06:55 )             39.3     10-02    135  |  101  |  7   ----------------------------<  122<H>  3.3<L>   |  24  |  0.90    Ca    8.9      02 Oct 2022 06:55  Phos  3.0     10-  Mg     1.8     10    TPro  8.5<H>  /  Alb  3.5  /  TBili  0.9  /  DBili  x   /  AST  13<L>  /  ALT  15  /  AlkPhos  83  10    ABG - ( 01 Oct 2022 22:46 )  pH, Arterial: 7.48  pH, Blood: x     /  pCO2: 35    /  pO2: 93    / HCO3: 26    / Base Excess: 2.8   /  SaO2: 98.9        CAPILLARY BLOOD GLUCOSE  POCT Blood Glucose.: 131 mg/dL (02 Oct 2022 00:55)  POCT Blood Glucose.: 139 mg/dL (01 Oct 2022 17:00)    LIVER FUNCTIONS - ( 01 Oct 2022 08:00 )  Alb: 3.5 g/dL / Pro: 8.5 gm/dL / ALK PHOS: 83 U/L / ALT: 15 U/L / AST: 13 U/L / GGT: x           PT/INR - ( 30 Sep 2022 20:25 )   PT: 12.3 sec;   INR: 1.03 ratio         PTT - ( 30 Sep 2022 20:25 )  PTT:28.2 sec  Urinalysis Basic - ( 01 Oct 2022 01:08 )    Color: Pale Yellow / Appearance: Clear / S.005 / pH: x  Gluc: x / Ketone: Trace  / Bili: Negative / Urobili: Negative mg/dL   Blood: x / Protein: 15 mg/dL / Nitrite: Positive   Leuk Esterase: Trace / RBC: 0-2 /HPF / WBC 0-2   Sq Epi: x / Non Sq Epi: Occasional / Bacteria: Negative      IMAGING:  < from: 12 Lead ECG (10.01.22 @ 16:47) >  Atrial fibrillation  Abnormal ECG  When compared with ECG of 01-OCT-2022 16:39,  Previous ECG has undetermined rhythm, needs review    < end of copied text >    < from: CT Head No Cont (10.01.22 @ 20:06) >  Motion degraded.  No gross CT evidence of intracranial pathology.    < end of copied text >    < from: CT Abdomen and Pelvis w/ IV Cont (22 @ 23:05) >  Urinary bladder is collapsed limiting evaluation however the bladder wall   appears thickened. Correlate clinically UTI/bladder pathology.    < end of copied text >    ASSESSMENT:  81-year-old with hypertension, diabetes, chronic cholecystitis, history of alcohol abuse quit a few months back, admitted with coffee-ground emesis, lethargy and apparently is unable communicate today as he appears obtunded.  Seen to be in newly recognized atrial fibrillation.  He is seen resting comfortably in bed earlier today in no acute distress.  Plan is for an EGD and gastric work-up.  Baseline tremor also noted. Discussed with patient's adult daughter this morning at bedside.    PLAN:     Continue current antihypertensive regimen of hydrochlorothiazide, amlodipine and losartan for blood pressure management.  Avoid AV mery blockade medications given slow ventricular response of A. fib which needs to be monitored for presence of early sick sinus syndrome on telemetry.  Continue GI management.  Watch for alcohol withdrawal. Echo ordered to assess LV function and valvular status.  Avoidance of anticoagulation antiplatelets in the setting is noted.  Follow H&H.      Jd Sykes MD, FACC, DEVON, BLAKE, FACP  Director, Heart Failure Services  [facility]  , Department of Cardiology  [facility]     CHIEF COMPLAINT:  Patient is a 81y old  Male who presents with a chief complaint of gi bleed    HPI:  81-year-old with hypertension, diabetes, chronic cholecystitis, history of alcohol abuse quit a few months back, admitted with coffee-ground emesis, lethargy and apparently is unable communicate today as he appears obtunded.  Seen to be in newly recognized atrial fibrillation.  He is seen resting comfortably in bed earlier today in no acute distress.  Plan is for an EGD and gastric work-up.  Baseline tremor also noted. Discussed with patient's adult daughter this morning at bedside.    ALLERGIES:  No Known Allergies    Home Medications:  amLODIPine 5 mg oral tablet: 1 tab(s) orally once a day (22 Aug 2019 14:46)  Artificial Tears ophthalmic solution: 1 drop(s) to each affected eye 4 times a day (02 Oct 2022 07:44)  atorvastatin 10 mg oral tablet: 1 tab(s) orally once a day (at bedtime) (22 Aug 2019 14:46)  Flonase 50 mcg/inh nasal spray: 1 spray(s) nasal once a day (02 Oct 2022 07:45)  indomethacin 75 mg oral capsule, extended release: 1 cap(s) orally once a day (02 Oct 2022 07:43)  losartan-hydroCHLOROthiazide 100 mg-25 mg oral tablet: 1 tab(s) orally once a day (20 Aug 2019 15:45)  meclizine 25 mg oral tablet: 1 tab(s) orally 2 times a day (20 Aug 2019 15:45)  metFORMIN: 500  orally 2 times a day (20 Aug 2019 15:45)  omeprazole 40 mg oral delayed release capsule: 1 cap(s) orally once a day (02 Oct 2022 07:42)  primidone 50 mg oral tablet: 50 milligram(s) orally once a day (02 Oct 2022 07:42)    PAST MEDICAL & SURGICAL HISTORY:  High blood pressure      Diabetes      High cholesterol            FAMILY HISTORY:  No pertinent family history in first degree relatives        SOCIAL HISTORY:  nonsmoker    REVIEW OF SYSTEMS:  General:  No wt loss, fevers, chills, night sweats  Eyes:  Good vision, no reported pain  ENT:  No sore throat, pain, runny nose, dysphagia  CV:  No pain, palpitations, hypo/hypertension  Resp:  No dyspnea, cough, tachypnea, wheezing  GI:  No pain, nausea, vomiting, diarrhea, constipation  :  No pain, bleeding, incontinence, nocturia  Muscle:  No pain, weakness  Breast:  No pain, abscess, mass, discharge  Neuro:  No weakness, tingling, memory problems  Psych:  No fatigue, insomnia, mood problems, depression  Endocrine:  No polyuria, polydipsia, cold/heat intolerance  Heme:  No petechiae, ecchymosis, easy bruisability  Skin:  No rash, edema    PHYSICAL EXAM:  Vital Signs:  Vital Signs Last 24 Hrs  T(C): 37 (02 Oct 2022 04:24), Max: 37 (02 Oct 2022 04:24)  T(F): 98.6 (02 Oct 2022 04:24), Max: 98.6 (02 Oct 2022 04:24)  HR: 75 (02 Oct 2022 04:24) (70 - 100)  BP: 145/77 (02 Oct 2022 04:24) (145/77 - 160/85)  RR: 19 (02 Oct 2022 04:24) (18 - 20)  SpO2: 94% (02 Oct 2022 04:24) (93% - 96%)    Parameters below as of 01 Oct 2022 15:50  Patient On (Oxygen Delivery Method): room air      Tele: Atrial fibrillation with periods of slow ventricular response.  3-second pause noted today.    Constitutional: well developed, normal appearance, well groomed, well nourished, no deformities and no acute distress.   Eyes: the conjunctiva exhibited no abnormalities and the eyelids demonstrated no xanthelasmas.   HEENT: normal oral mucosa, no oral pallor and no oral cyanosis.   Neck: normal jugular venous A waves present, normal jugular venous V waves present and no jugular venous mueller A waves.   Pulmonary: no respiratory distress, normal respiratory rhythm and effort, no accessory muscle use and lungs were clear to auscultation bilaterally.   Cardiovascular: heart rate and rhythm were normal, normal S1 and S2 and no murmur, gallop, rub, heave or thrill are present.   Abdomen: soft, non-tender.  Musculoskeletal: the gait could not be assessed.  Extremities: no clubbing of the fingernails, no localized cyanosis, no petechial hemorrhages and no ischemic changes.   Skin: normal skin color and pigmentation, no rash, no venous stasis, no skin lesions, no skin ulcer and no xanthoma was observed.       LABORATORY:                          13.5   6.82  )-----------( 224      ( 02 Oct 2022 06:55 )             39.3     10-02    135  |  101  |  7   ----------------------------<  122<H>  3.3<L>   |  24  |  0.90    Ca    8.9      02 Oct 2022 06:55  Phos  3.0     10-  Mg     1.8     10    TPro  8.5<H>  /  Alb  3.5  /  TBili  0.9  /  DBili  x   /  AST  13<L>  /  ALT  15  /  AlkPhos  83  10    ABG - ( 01 Oct 2022 22:46 )  pH, Arterial: 7.48  pH, Blood: x     /  pCO2: 35    /  pO2: 93    / HCO3: 26    / Base Excess: 2.8   /  SaO2: 98.9        CAPILLARY BLOOD GLUCOSE  POCT Blood Glucose.: 131 mg/dL (02 Oct 2022 00:55)  POCT Blood Glucose.: 139 mg/dL (01 Oct 2022 17:00)    LIVER FUNCTIONS - ( 01 Oct 2022 08:00 )  Alb: 3.5 g/dL / Pro: 8.5 gm/dL / ALK PHOS: 83 U/L / ALT: 15 U/L / AST: 13 U/L / GGT: x           PT/INR - ( 30 Sep 2022 20:25 )   PT: 12.3 sec;   INR: 1.03 ratio         PTT - ( 30 Sep 2022 20:25 )  PTT:28.2 sec  Urinalysis Basic - ( 01 Oct 2022 01:08 )    Color: Pale Yellow / Appearance: Clear / S.005 / pH: x  Gluc: x / Ketone: Trace  / Bili: Negative / Urobili: Negative mg/dL   Blood: x / Protein: 15 mg/dL / Nitrite: Positive   Leuk Esterase: Trace / RBC: 0-2 /HPF / WBC 0-2   Sq Epi: x / Non Sq Epi: Occasional / Bacteria: Negative      IMAGING:  < from: 12 Lead ECG (10.01.22 @ 16:47) >  Atrial fibrillation  Abnormal ECG  When compared with ECG of 01-OCT-2022 16:39,  Previous ECG has undetermined rhythm, needs review    < end of copied text >    < from: CT Head No Cont (10.01.22 @ 20:06) >  Motion degraded.  No gross CT evidence of intracranial pathology.    < end of copied text >    < from: CT Abdomen and Pelvis w/ IV Cont (22 @ 23:05) >  Urinary bladder is collapsed limiting evaluation however the bladder wall   appears thickened. Correlate clinically UTI/bladder pathology.    < end of copied text >    < from: TTE Echo Complete w/o Contrast w/ Doppler (10.01.22 @ 13:12) >  Summary:   1. Left ventricular ejection fraction, by visual estimation, is 60 to   65%.   2. Technically adequate study.   3. Normal global left ventricular systolic function.   4. Normal left ventricular internal cavity size.   5. The mitral in-flow pattern reveals no discernable A-wave, therefore   no comment on diastolic function can be made.   6. There is mild concentric left ventricular hypertrophy.   7. Normal right ventricular size and function.   8. Mildly enlarged left atrium.   9. Normal right atrial size.  10. There is no evidence of pericardial effusion.  11. Trace mitral valve regurgitation.  12. Mild tricuspid regurgitation.  13. Sclerotic aortic valve with normal opening.    < end of copied text >    ASSESSMENT:  81-year-old with hypertension, diabetes, chronic cholecystitis, history of alcohol abuse quit a few months back, admitted with coffee-ground emesis, lethargy and apparently is unable communicate today as he appears obtunded.  Seen to be in newly recognized atrial fibrillation.  He is seen resting comfortably in bed earlier today in no acute distress.  Plan is for an EGD and gastric work-up.  Baseline tremor also noted. Discussed with patient's adult daughter this morning at bedside.    PLAN:     Continue current antihypertensive regimen of hydrochlorothiazide, amlodipine and losartan for blood pressure management.  Avoid AV mery blockade medications given slow ventricular response of A. fib which needs to be monitored for presence of early sick sinus syndrome on telemetry.  Continue GI management.  Watch for alcohol withdrawal. Avoidance of anticoagulation antiplatelets in the setting is noted.  Follow H&H.      Jd Sykes MD, FACC, BLAKE OSORIO, FACP  Director, Heart Failure Services  [facility]  , Department of Cardiology  [facility]

## 2022-10-02 NOTE — PHYSICAL THERAPY INITIAL EVALUATION ADULT - ADDITIONAL COMMENTS
2 weeks ago pt could be left alone in home, he was able to walk to nearby grocery stores without AD, pay with credit card at the store, do his own laundry in basement . Did not use any diaper

## 2022-10-02 NOTE — PHYSICAL THERAPY INITIAL EVALUATION ADULT - GENERAL OBSERVATIONS, REHAB EVAL
Pt recd supine NAD. Pt in deep sleep - needed max tactile, verbal stimuli to open eyes. Pt's dtr Caty @ bedside throughout session. She called pt's girlfriend Rosmery & grandblaine Subramanian on face time video call during PT session to motivate the patient. Pt is max A in bed mob Needed max A to maintain static sitt at EOB for 3 min . Unable to std with WR in multiplel trials using RW despite motivation by family members. Remained mostly quiet in session but able answer with 1-2 words intermittently. .Tremors noted in RUE throughout session. Noted L AC joint separation -dislocation  . Per daughter pt has painful & limited ROM because of it since August 2019 after a fall.

## 2022-10-02 NOTE — PHYSICAL THERAPY INITIAL EVALUATION ADULT - PERTINENT HX OF CURRENT PROBLEM, REHAB EVAL
Admitted for several coffee ground emesis  Per dtr in the last 2 weeks pt's aaron  started declining. His voice got hoarse, balance in walking decreased, Unable to negotiate stairs to do laundry in basement, unable to take shower 2* inability to climb the tub & hand shakes worsened

## 2022-10-02 NOTE — PROGRESS NOTE ADULT - ASSESSMENT
HPI:  This is an 81M hx dm, htn, chronic cholecystitis, prior ETOH abuse quit a few mo ago, presenting due to several episodes of coffee ground emesis per daughter. He also appears more lethargic. Poor historian. on presentation juan j 177/84 and ekg shows new onstet AF 93 bpm. labs significant for normal Hgb 14.5, normal platelets, lactate 2.6, mag 1.5. Ct abd/pelvis unremarkable for acute pathology. denies cp, palpitations, syncope, orthopnea, pnd, h/a, abd pain, diarrhea, hematochezia, melena.      (01 Oct 2022 00:48)  ----- As Above ----- Lethargic, History obtained from chart, daughter  The patient had been in his USOH until yesterday when he was found by his daughter to be very lethargic and had black material all over him. He then vomited more black material. The last time he vomited black material, he had vomited a small amount of black material in the beginning of the year. The  last time he drank was fathers day weekend. The daughter denies all NSAIDs.  The patient has  " Chronic Cholecystitis. " The daughter states that they have been trying to get a doctor to put in a fistula.       A) Coffee ground emesis - R/O PUD, gastritis, gastropathy, etc H/H  14.5 14.2  14.1 13.5 1) PPI 2) Clear liquid diet when alert enough  3) Will eventually need an EGD 4) Hold anticoagulants 5) NGT if it continues  B) Lethargy - Ammonia < 10 Head CT neg  C) " chronic cholecystitis ? - See Ct scan  1) Old records

## 2022-10-02 NOTE — PROGRESS NOTE ADULT - SUBJECTIVE AND OBJECTIVE BOX
Patient is a 81y old  Male who presents with a chief complaint of gi bleed (02 Oct 2022 10:36)      HPI:  This is an 81M hx dm, htn, chronic cholecystitis, prior ETOH abuse quit a few mo ago, presenting due to several episodes of coffee ground emesis per daughter. He also appears more lethargic. Poor historian. on presentation juan j 177/84 and ekg shows new onstet AF 93 bpm. labs significant for normal Hgb 14.5, normal platelets, lactate 2.6, mag 1.5. Ct abd/pelvis unremarkable for acute pathology. denies cp, palpitations, syncope, orthopnea, pnd, h/a, abd pain, diarrhea, hematochezia, melena.      (01 Oct 2022 00:48)      INTERVAL HPI/OVERNIGHT EVENTS: Patient seen earlier today  Less lethargic. Can converse slightly. The nurse denies melena, hematochezia, hematemesis, nausea, vomiting, abdominal pain, constipation, diarrhea, or change in bowel movements       MEDICATIONS  (STANDING):  amLODIPine   Tablet 5 milliGRAM(s) Oral daily  atorvastatin 10 milliGRAM(s) Oral at bedtime  dextrose 5%. 1000 milliLiter(s) (100 mL/Hr) IV Continuous <Continuous>  dextrose 5%. 1000 milliLiter(s) (50 mL/Hr) IV Continuous <Continuous>  dextrose 50% Injectable 25 Gram(s) IV Push once  dextrose 50% Injectable 12.5 Gram(s) IV Push once  dextrose 50% Injectable 25 Gram(s) IV Push once  folic acid 1 milliGRAM(s) Oral daily  glucagon  Injectable 1 milliGRAM(s) IntraMuscular once  hydrochlorothiazide 25 milliGRAM(s) Oral daily  influenza  Vaccine (HIGH DOSE) 0.7 milliLiter(s) IntraMuscular once  insulin lispro (ADMELOG) corrective regimen sliding scale   SubCutaneous three times a day before meals  losartan 100 milliGRAM(s) Oral daily  multivitamin 1 Tablet(s) Oral daily  pantoprazole  Injectable 40 milliGRAM(s) IV Push two times a day  sodium chloride 0.9% with potassium chloride 20 mEq/L 1000 milliLiter(s) (60 mL/Hr) IV Continuous <Continuous>  thiamine 100 milliGRAM(s) Oral daily    MEDICATIONS  (PRN):  acetaminophen     Tablet .. 650 milliGRAM(s) Oral every 6 hours PRN Temp greater or equal to 38C (100.4F), Mild Pain (1 - 3)  aluminum hydroxide/magnesium hydroxide/simethicone Suspension 30 milliLiter(s) Oral every 4 hours PRN Dyspepsia  dextrose Oral Gel 15 Gram(s) Oral once PRN Blood Glucose LESS THAN 70 milliGRAM(s)/deciliter  LORazepam   Injectable 1 milliGRAM(s) IV Push every 2 hours PRN CIWA-Ar score increase by 2 points and a total score of 7 or less  LORazepam   Injectable 1 milliGRAM(s) IV Push every 1 hour PRN CIWA-Ar score 8 or greater  meclizine 25 milliGRAM(s) Oral four times a day PRN Dizziness  melatonin 3 milliGRAM(s) Oral at bedtime PRN Insomnia  ondansetron Injectable 4 milliGRAM(s) IV Push every 8 hours PRN Nausea and/or Vomiting      FAMILY HISTORY:  No pertinent family history in first degree relatives        Allergies    No Known Allergies    Intolerances        PMH/PSH:  High blood pressure    Diabetes    High cholesterol          REVIEW OF SYSTEMS:  CONSTITUTIONAL: No fever, weight loss, or fatigue  EYES: No eye pain, visual disturbances, or discharge  ENMT:  No difficulty hearing, tinnitus, vertigo; No sinus or throat pain  NECK: No pain or stiffness  BREASTS: No pain, masses, or nipple discharge  RESPIRATORY: No cough, wheezing, chills or hemoptysis; No shortness of breath  CARDIOVASCULAR: No chest pain, palpitations, dizziness, or leg swelling  GASTROINTESTINAL: See above   GENITOURINARY: No dysuria, frequency, hematuria, or incontinence  NEUROLOGICAL: No headaches,  numbness, or tremors  SKIN: No itching, burning, rashes, or lesions   LYMPH NODES: No enlarged glands  ENDOCRINE: No heat or cold intolerance; No hair loss  MUSCULOSKELETAL: No joint pain or swelling; No muscle, back, or extremity pain  PSYCHIATRIC: No depression, anxiety, mood swings, or difficulty sleeping  HEME/LYMPH: No easy bruising, or bleeding gums  ALLERGY AND IMMUNOLOGIC: No hives or eczema    Vital Signs Last 24 Hrs  T(C): 36.8 (02 Oct 2022 16:37), Max: 37 (02 Oct 2022 04:24)  T(F): 98.2 (02 Oct 2022 16:37), Max: 98.6 (02 Oct 2022 04:24)  HR: 64 (02 Oct 2022 16:37) (64 - 100)  BP: 127/89 (02 Oct 2022 16:37) (127/89 - 173/89)  BP(mean): --  RR: 18 (02 Oct 2022 16:37) (16 - 19)  SpO2: 98% (02 Oct 2022 16:37) (94% - 98%)    Parameters below as of 02 Oct 2022 16:37  Patient On (Oxygen Delivery Method): room air        PHYSICAL EXAM:  GENERAL: NAD, well-groomed, well-developed  HEAD:  Atraumatic, Normocephalic  EYES: EOMI, PERRLA, conjunctiva and sclera clear  NECK: Supple, No JVD, Normal thyroid  NERVOUS SYSTEM:  Alert & Oriented X0, Poor concentration;   CHEST/LUNG: Clear to percussion bilaterally; No rales, rhonchi, wheezing, or rubs  HEART: Regular rate and rhythm; No murmurs, rubs, or gallops  ABDOMEN: Soft, Nontender, Nondistended; Bowel sounds present  EXTREMITIES:  2+ Peripheral Pulses, No clubbing, cyanosis, or edema  LYMPH: No lymphadenopathy noted  SKIN: No rashes or lesions    LAB  10-01 @ 01:08  amylase --   lipase 105                           13.5   6.82  )-----------( 224      ( 02 Oct 2022 06:55 )             39.3       CBC:  10-02 @ 06:55  WBC 6.82   Hgb 13.5   Hct 39.3   Plts 224  MCV 95.9  10-01 @ 08:00  WBC 9.58   Hgb 14.1   Hct 40.6   Plts 231  .2  10-01 @ 01:08  WBC 8.39   Hgb 14.2   Hct 41.0   Plts 220  MCV 98.3   @ 20:25  WBC 8.41   Hgb 14.5   Hct 41.7   Plts 234  MCV 97.7      Chemistry:  10-02 @ 06:55  Na+ 135  K+ 3.3  Cl- 101  CO2 24  BUN 7  Cr 0.90     10-01 @ 08:00  Na+ 136  K+ 3.3  Cl- 102  CO2 26  BUN 8  Cr 1.00      @ 20:25  Na+ 138  K+ 3.8  Cl- 102  CO2 26  BUN 7  Cr 1.07         Glucose, Serum: 122 mg/dL (10-02 @ 06:55)  Glucose, Serum: 116 mg/dL (10-01 @ 08:00)  Glucose, Serum: 176 mg/dL ( @ 20:25)      02 Oct 2022 06:55    135    |  101    |  7      ----------------------------<  122    3.3     |  24     |  0.90   01 Oct 2022 08:00    136    |  102    |  8      ----------------------------<  116    3.3     |  26     |  1.00   30 Sep 2022 20:25    138    |  102    |  7      ----------------------------<  176    3.8     |  26     |  1.07     Ca    8.9        02 Oct 2022 06:55  Ca    9.2        01 Oct 2022 08:00  Ca    9.6        30 Sep 2022 20:25  Phos  3.0       02 Oct 2022 06:55  Phos  2.9       01 Oct 2022 08:00  Mg     1.8       02 Oct 2022 06:55  Mg     2.2       01 Oct 2022 08:00  Mg     1.5       30 Sep 2022 20:25    TPro  8.5    /  Alb  3.5    /  TBili  0.9    /  DBili  x      /  AST  13     /  ALT  15     /  AlkPhos  83     01 Oct 2022 08:00  TPro  8.3    /  Alb  3.5    /  TBili  0.8    /  DBili  x      /  AST  11     /  ALT  16     /  AlkPhos  84     30 Sep 2022 20:25      PT/INR - ( 30 Sep 2022 20:25 )   PT: 12.3 sec;   INR: 1.03 ratio         PTT - ( 30 Sep 2022 20:25 )  PTT:28.2 sec    Urinalysis Basic - ( 01 Oct 2022 01:08 )    Color: Pale Yellow / Appearance: Clear / S.005 / pH: x  Gluc: x / Ketone: Trace  / Bili: Negative / Urobili: Negative mg/dL   Blood: x / Protein: 15 mg/dL / Nitrite: Positive   Leuk Esterase: Trace / RBC: 0-2 /HPF / WBC 0-2   Sq Epi: x / Non Sq Epi: Occasional / Bacteria: Negative        CAPILLARY BLOOD GLUCOSE      POCT Blood Glucose.: 123 mg/dL (02 Oct 2022 16:23)  POCT Blood Glucose.: 117 mg/dL (02 Oct 2022 11:03)  POCT Blood Glucose.: 113 mg/dL (02 Oct 2022 08:31)  POCT Blood Glucose.: 126 mg/dL (02 Oct 2022 08:04)  POCT Blood Glucose.: 134 mg/dL (02 Oct 2022 05:52)  POCT Blood Glucose.: 131 mg/dL (02 Oct 2022 00:55)          RADIOLOGY & ADDITIONAL TESTS:    Imaging Personally Reviewed:  [ ] YES  [ ] NO    Consultant(s) Notes Reviewed:  [ ] YES  [ ] NO    Care Discussed with Consultants/Other Providers [ ] YES  [ ] NO

## 2022-10-02 NOTE — PROGRESS NOTE ADULT - SUBJECTIVE AND OBJECTIVE BOX
81M hx DM, HLD, HTN, chronic cholecystitis, prior ETOH abuse p/w several episodes of coffee ground emesis, lactic acidosis and new onstet A. Fib. He is lying in bed in NAD.       MEDICATIONS  (STANDING):  amLODIPine   Tablet 5 milliGRAM(s) Oral daily  artificial  tears Solution 1 Drop(s) Both EYES every 6 hours  atorvastatin 10 milliGRAM(s) Oral at bedtime  dextrose 5%. 1000 milliLiter(s) (50 mL/Hr) IV Continuous <Continuous>  dextrose 5%. 1000 milliLiter(s) (100 mL/Hr) IV Continuous <Continuous>  dextrose 50% Injectable 25 Gram(s) IV Push once  dextrose 50% Injectable 12.5 Gram(s) IV Push once  dextrose 50% Injectable 25 Gram(s) IV Push once  folic acid 1 milliGRAM(s) Oral daily  glucagon  Injectable 1 milliGRAM(s) IntraMuscular once  hydrochlorothiazide 25 milliGRAM(s) Oral daily  influenza  Vaccine (HIGH DOSE) 0.7 milliLiter(s) IntraMuscular once  insulin lispro (ADMELOG) corrective regimen sliding scale   SubCutaneous three times a day before meals  losartan 100 milliGRAM(s) Oral daily  multivitamin 1 Tablet(s) Oral daily  pantoprazole  Injectable 40 milliGRAM(s) IV Push two times a day  sodium chloride 0.9% with potassium chloride 20 mEq/L 1000 milliLiter(s) (60 mL/Hr) IV Continuous <Continuous>  thiamine 100 milliGRAM(s) Oral daily    MEDICATIONS  (PRN):  acetaminophen     Tablet .. 650 milliGRAM(s) Oral every 6 hours PRN Temp greater or equal to 38C (100.4F), Mild Pain (1 - 3)  aluminum hydroxide/magnesium hydroxide/simethicone Suspension 30 milliLiter(s) Oral every 4 hours PRN Dyspepsia  dextrose Oral Gel 15 Gram(s) Oral once PRN Blood Glucose LESS THAN 70 milliGRAM(s)/deciliter  meclizine 25 milliGRAM(s) Oral four times a day PRN Dizziness  melatonin 3 milliGRAM(s) Oral at bedtime PRN Insomnia  ondansetron Injectable 4 milliGRAM(s) IV Push every 8 hours PRN Nausea and/or Vomiting      Allergies    No Known Allergies    Intolerances        Vital Signs Last 24 Hrs  T(C): 36.8 (02 Oct 2022 16:37), Max: 37 (02 Oct 2022 04:24)  T(F): 98.2 (02 Oct 2022 16:37), Max: 98.6 (02 Oct 2022 04:24)  HR: 64 (02 Oct 2022 16:37) (64 - 100)  BP: 127/89 (02 Oct 2022 16:37) (127/89 - 173/89)  BP(mean): --  RR: 18 (02 Oct 2022 16:37) (16 - 19)  SpO2: 98% (02 Oct 2022 16:37) (94% - 98%)    Parameters below as of 02 Oct 2022 16:37  Patient On (Oxygen Delivery Method): room air        PHYSICAL EXAM:  GENERAL: NAD, well-groomed, well-developed  HEAD:  Atraumatic, Normocephalic  EYES: EOMI, PERRLA   NECK: Supple   NERVOUS SYSTEM:  Alert & confused   CHEST/LUNG: Clear to auscultation bilaterally; No rales, rhonchi, wheezing, or rubs  HEART: Regular rate and rhythm; No murmurs, rubs, or gallops  ABDOMEN: Soft, Nontender, Nondistended; Bowel sounds present  EXTREMITIES: No clubbing, cyanosis, or edema     LABS:                        13.5   6.82  )-----------( 224      ( 02 Oct 2022 06:55 )             39.3     10-02    135  |  101  |  7   ----------------------------<  122<H>  3.3<L>   |  24  |  0.90    Ca    8.9      02 Oct 2022 06:55  Phos  3.0     10-02  Mg     1.8     10-02    TPro  8.5<H>  /  Alb  3.5  /  TBili  0.9  /  DBili  x   /  AST  13<L>  /  ALT  15  /  AlkPhos  83  10-01    PT/INR - ( 30 Sep 2022 20:25 )   PT: 12.3 sec;   INR: 1.03 ratio         PTT - ( 30 Sep 2022 20:25 )  PTT:28.2 sec  Urinalysis Basic - ( 01 Oct 2022 01:08 )    Color: Pale Yellow / Appearance: Clear / S.005 / pH: x  Gluc: x / Ketone: Trace  / Bili: Negative / Urobili: Negative mg/dL   Blood: x / Protein: 15 mg/dL / Nitrite: Positive   Leuk Esterase: Trace / RBC: 0-2 /HPF / WBC 0-2   Sq Epi: x / Non Sq Epi: Occasional / Bacteria: Negative       POCT Blood Glucose.: 123 mg/dL (02 Oct 2022 16:23)  POCT Blood Glucose.: 117 mg/dL (02 Oct 2022 11:03)  POCT Blood Glucose.: 113 mg/dL (02 Oct 2022 08:31)  POCT Blood Glucose.: 126 mg/dL (02 Oct 2022 08:04)  POCT Blood Glucose.: 134 mg/dL (02 Oct 2022 05:52)  POCT Blood Glucose.: 131 mg/dL (02 Oct 2022 00:55)      Culture - Urine (collected 01 Oct 2022 01:30)  Source: Clean Catch Clean Catch (Midstream)  Final Report (02 Oct 2022 08:29):    No growth    Culture - Blood (collected 30 Sep 2022 22:10)  Source: .Blood Blood  Preliminary Report (02 Oct 2022 11:00):    No growth to date.    Culture - Blood (collected 30 Sep 2022 22:10)  Source: .Blood Blood  Preliminary Report (02 Oct 2022 11:00):    No growth to date.      RADIOLOGY & ADDITIONAL TESTS:

## 2022-10-02 NOTE — PHYSICAL THERAPY INITIAL EVALUATION ADULT - DIAGNOSIS, PT EVAL
Difficulty walking Mouth with normal mucosa. Throat has no vesicles, no oropharyngeal exudates and uvula is midline.

## 2022-10-02 NOTE — PHYSICAL THERAPY INITIAL EVALUATION ADULT - ACTIVE RANGE OF MOTION EXAMINATION, REHAB EVAL
Limitation of B/l Sh  ROM  L>R.  B elbows/hands wfl/bilateral  lower extremity Active ROM was WFL (within functional limits)

## 2022-10-03 ENCOUNTER — TRANSCRIPTION ENCOUNTER (OUTPATIENT)
Age: 82
End: 2022-10-03

## 2022-10-03 LAB
ANION GAP SERPL CALC-SCNC: 7 MMOL/L — SIGNIFICANT CHANGE UP (ref 5–17)
BUN SERPL-MCNC: 8 MG/DL — SIGNIFICANT CHANGE UP (ref 7–23)
CALCIUM SERPL-MCNC: 9 MG/DL — SIGNIFICANT CHANGE UP (ref 8.5–10.1)
CHLORIDE SERPL-SCNC: 102 MMOL/L — SIGNIFICANT CHANGE UP (ref 96–108)
CO2 SERPL-SCNC: 27 MMOL/L — SIGNIFICANT CHANGE UP (ref 22–31)
CREAT SERPL-MCNC: 0.91 MG/DL — SIGNIFICANT CHANGE UP (ref 0.5–1.3)
EGFR: 85 ML/MIN/1.73M2 — SIGNIFICANT CHANGE UP
GLUCOSE BLDC GLUCOMTR-MCNC: 103 MG/DL — HIGH (ref 70–99)
GLUCOSE BLDC GLUCOMTR-MCNC: 105 MG/DL — HIGH (ref 70–99)
GLUCOSE BLDC GLUCOMTR-MCNC: 106 MG/DL — HIGH (ref 70–99)
GLUCOSE BLDC GLUCOMTR-MCNC: 145 MG/DL — HIGH (ref 70–99)
GLUCOSE BLDC GLUCOMTR-MCNC: 239 MG/DL — HIGH (ref 70–99)
GLUCOSE SERPL-MCNC: 110 MG/DL — HIGH (ref 70–99)
HCT VFR BLD CALC: 42.5 % — SIGNIFICANT CHANGE UP (ref 39–50)
HGB BLD-MCNC: 14.4 G/DL — SIGNIFICANT CHANGE UP (ref 13–17)
MAGNESIUM SERPL-MCNC: 1.9 MG/DL — SIGNIFICANT CHANGE UP (ref 1.6–2.6)
MCHC RBC-ENTMCNC: 33.3 PG — SIGNIFICANT CHANGE UP (ref 27–34)
MCHC RBC-ENTMCNC: 33.9 G/DL — SIGNIFICANT CHANGE UP (ref 32–36)
MCV RBC AUTO: 98.2 FL — SIGNIFICANT CHANGE UP (ref 80–100)
NRBC # BLD: 0 /100 WBCS — SIGNIFICANT CHANGE UP (ref 0–0)
PHOSPHATE SERPL-MCNC: 2.8 MG/DL — SIGNIFICANT CHANGE UP (ref 2.5–4.5)
PLATELET # BLD AUTO: 212 K/UL — SIGNIFICANT CHANGE UP (ref 150–400)
POTASSIUM SERPL-MCNC: 3.7 MMOL/L — SIGNIFICANT CHANGE UP (ref 3.5–5.3)
POTASSIUM SERPL-SCNC: 3.7 MMOL/L — SIGNIFICANT CHANGE UP (ref 3.5–5.3)
RBC # BLD: 4.33 M/UL — SIGNIFICANT CHANGE UP (ref 4.2–5.8)
RBC # FLD: 13 % — SIGNIFICANT CHANGE UP (ref 10.3–14.5)
SODIUM SERPL-SCNC: 136 MMOL/L — SIGNIFICANT CHANGE UP (ref 135–145)
WBC # BLD: 6.96 K/UL — SIGNIFICANT CHANGE UP (ref 3.8–10.5)
WBC # FLD AUTO: 6.96 K/UL — SIGNIFICANT CHANGE UP (ref 3.8–10.5)

## 2022-10-03 PROCEDURE — 99232 SBSQ HOSP IP/OBS MODERATE 35: CPT

## 2022-10-03 PROCEDURE — 99233 SBSQ HOSP IP/OBS HIGH 50: CPT | Mod: FS

## 2022-10-03 RX ORDER — AMLODIPINE BESYLATE 2.5 MG/1
10 TABLET ORAL DAILY
Refills: 0 | Status: DISCONTINUED | OUTPATIENT
Start: 2022-10-03 | End: 2022-10-05

## 2022-10-03 RX ADMIN — AMLODIPINE BESYLATE 5 MILLIGRAM(S): 2.5 TABLET ORAL at 06:25

## 2022-10-03 RX ADMIN — ATORVASTATIN CALCIUM 10 MILLIGRAM(S): 80 TABLET, FILM COATED ORAL at 22:04

## 2022-10-03 RX ADMIN — DEXTROSE MONOHYDRATE, SODIUM CHLORIDE, AND POTASSIUM CHLORIDE 60 MILLILITER(S): 50; .745; 4.5 INJECTION, SOLUTION INTRAVENOUS at 11:09

## 2022-10-03 RX ADMIN — Medication 1 DROP(S): at 22:06

## 2022-10-03 RX ADMIN — Medication 1 DROP(S): at 17:01

## 2022-10-03 RX ADMIN — Medication 1 TABLET(S): at 11:08

## 2022-10-03 RX ADMIN — PANTOPRAZOLE SODIUM 40 MILLIGRAM(S): 20 TABLET, DELAYED RELEASE ORAL at 06:25

## 2022-10-03 RX ADMIN — PANTOPRAZOLE SODIUM 40 MILLIGRAM(S): 20 TABLET, DELAYED RELEASE ORAL at 17:01

## 2022-10-03 RX ADMIN — Medication 100 MILLIGRAM(S): at 11:09

## 2022-10-03 RX ADMIN — Medication 1 DROP(S): at 11:08

## 2022-10-03 RX ADMIN — Medication 1 MILLIGRAM(S): at 11:08

## 2022-10-03 RX ADMIN — Medication 25 MILLIGRAM(S): at 06:26

## 2022-10-03 RX ADMIN — Medication 1 DROP(S): at 06:25

## 2022-10-03 RX ADMIN — LOSARTAN POTASSIUM 100 MILLIGRAM(S): 100 TABLET, FILM COATED ORAL at 06:25

## 2022-10-03 NOTE — PROGRESS NOTE ADULT - SUBJECTIVE AND OBJECTIVE BOX
Patient is a 81y old  Male who presents with gi bleed (02 Oct 2022 10:36)    PAST MEDICAL & SURGICAL HISTORY:  High blood pressure    Diabetes    High cholesterol    Cholecystitis     ETOH    INTERVAL HISTORY: resting in bed in no distress, awake and responsive, denies any chest pain or sob   	  MEDICATIONS:  MEDICATIONS  (STANDING):  amLODIPine   Tablet 5 milliGRAM(s) Oral daily  artificial  tears Solution 1 Drop(s) Both EYES every 6 hours  atorvastatin 10 milliGRAM(s) Oral at bedtime  folic acid 1 milliGRAM(s) Oral daily  hydrochlorothiazide 25 milliGRAM(s) Oral daily  influenza  Vaccine (HIGH DOSE) 0.7 milliLiter(s) IntraMuscular once  insulin lispro (ADMELOG) corrective regimen sliding scale   SubCutaneous three times a day before meals  losartan 100 milliGRAM(s) Oral daily  multivitamin 1 Tablet(s) Oral daily  pantoprazole  Injectable 40 milliGRAM(s) IV Push two times a day  sodium chloride 0.9% with potassium chloride 20 mEq/L 1000 milliLiter(s) (60 mL/Hr) IV Continuous <Continuous>  thiamine 100 milliGRAM(s) Oral daily    MEDICATIONS  (PRN):  acetaminophen     Tablet .. 650 milliGRAM(s) Oral every 6 hours PRN Temp greater or equal to 38C (100.4F), Mild Pain (1 - 3)  aluminum hydroxide/magnesium hydroxide/simethicone Suspension 30 milliLiter(s) Oral every 4 hours PRN Dyspepsia  dextrose Oral Gel 15 Gram(s) Oral once PRN Blood Glucose LESS THAN 70 milliGRAM(s)/deciliter  meclizine 25 milliGRAM(s) Oral four times a day PRN Dizziness  melatonin 3 milliGRAM(s) Oral at bedtime PRN Insomnia  ondansetron Injectable 4 milliGRAM(s) IV Push every 8 hours PRN Nausea and/or Vomiting    Vitals:  T(F): 98.1 (10-03-22 @ 11:45), Max: 98.1 (10-03-22 @ 11:45)  HR: 80 (10-03-22 @ 11:45) (69 - 87)  BP: 159/79 (10-03-22 @ 11:45) (159/79 - 165/89)  RR: 17 (10-03-22 @ 11:45) (17 - 18)  SpO2: 97% (10-03-22 @ 11:45) (97% - 98%)    10-03 @ 07:01  -  10-03 @ 16:46  --------------------------------------------------------  IN:    Oral Fluid: 1680 mL  Total IN: 1680 mL    OUT:  Total OUT: 0 mL    Total NET: 1680 mL    Weight (kg): 88.2 (10-01 @ 15:50)  BMI (kg/m2): 29.6 (10-01 @ 15:50)    PHYSICAL EXAM:  Neuro: Awake, responsive  CV: S1 S2 irreg irregular   Lungs: CTABL  GI: Soft, BS +, ND, NT  Extremities: Trace LE edema    TELEMETRY: afib    RADIOLOGY: < from: CT Abdomen and Pelvis w/ IV Cont (09.30.22 @ 23:05) >  LOWER CHEST: Within normal limits.    LIVER: Within normal limits.  BILE DUCTS: Normal caliber.  GALLBLADDER: Contracted limiting evaluation.  SPLEEN: Within normal limits.  PANCREAS: Within normal limits.  ADRENALS: Small right renal calcifications are unchanged.  KIDNEYS/URETERS: 5.5 cm left renal exophytic mildly complex cyst   unchanged from previous imaging.    BLADDER: Urinary bladder is collapsed limiting evaluation however the   bladder wall appears thickened. Correlate clinically UTI/bladder   pathology.  REPRODUCTIVE ORGANS: Prostate within normal limits.    BOWEL: Small hiatal hernia. No bowel obstruction. Appendix is normal.   Colonic diverticulosis without diverticulitis.  PERITONEUM: No ascites.  VESSELS: Atherosclerotic changes.  RETROPERITONEUM/LYMPH NODES: No lymphadenopathy.  ABDOMINAL WALL: Tiny fat-containing umbilical hernia. Small   fat-containing inguinal hernias.  BONES: Degenerative changes.    IMPRESSION:    Urinary bladder is collapsed limiting evaluation however the bladder wall   appears thickened. Correlate clinically UTI/bladder pathology.    < end of copied text >    DIAGNOSTIC TESTING:    [x ] Echocardiogram: < from: TTE Echo Complete w/o Contrast w/ Doppler (10.01.22 @ 13:12) >  Left Ventricle: The left ventricular internal cavity size is normal.  Global LV systolic function was normal. Left ventricular ejection   fraction, by visual estimation, is 60 to 65%. The mitral in-flow pattern   reveals no discernable A-wave, therefore no comment on diastolic function   can be made.  Right Ventricle: Normal right ventricular size and function.  Left Atrium: Mildly enlarged left atrium.  Right Atrium: Normal right atrial size.  Pericardium: There is no evidence of pericardial effusion.  Mitral Valve: Structurally normal mitral valve, with normal leaflet   excursion. Trace mitral valve regurgitation is seen.  Tricuspid Valve: Structurally normal tricuspid valve, with normal leaflet   excursion. The tricuspid valve is normal in structure. Mild tricuspid   regurgitation is visualized.  Aortic Valve: The aortic valve was not well visualized. Sclerotic aortic   valve with normal opening. No evidence of aortic valve regurgitation is   seen.  Pulmonic Valve: The pulmonic valve was not well visualized. No indication   of pulmonic valve regurgitation.  Aorta: Aortic root measured at Sinus of Valsalva is normal.  Venous: The inferior vena cava was normal sized, with respiratory size   variation greater than 50%.      Summary:   1. Left ventricular ejection fraction, by visual estimation, is 60 to   65%.   2. Technically adequate study.   3. Normal global left ventricular systolic function.   4. Normal left ventricular internal cavity size.   5. The mitral in-flow pattern reveals no discernable A-wave, therefore   no comment on diastolic function can be made.   6. There is mild concentric left ventricular hypertrophy.   7. Normal right ventricular size and function.   8. Mildly enlarged left atrium.   9. Normal right atrial size.  10. There is no evidence of pericardial effusion.  11. Trace mitral valve regurgitation.  12. Mild tricuspid regurgitation.  13. Sclerotic aortic valve with normal opening.    < end of copied text >     LABS:	 	    03 Oct 2022 06:25    136    |  102    |  8      ----------------------------<  110    3.7     |  27     |  0.91   02 Oct 2022 06:55    135    |  101    |  7      ----------------------------<  122    3.3     |  24     |  0.90   01 Oct 2022 08:00    136    |  102    |  8      ----------------------------<  116    3.3     |  26     |  1.00     Ca    9.0        03 Oct 2022 06:25  Phos  2.8       03 Oct 2022 06:25  Mg     1.9       03 Oct 2022 06:25                        14.4   6.96  )-----------( 212      ( 03 Oct 2022 06:25 )             42.5 ,                       13.5   6.82  )-----------( 224      ( 02 Oct 2022 06:55 )             39.3 ,                       14.1   9.58  )-----------( 231      ( 01 Oct 2022 08:00 )             40.6 ,                       14.2   8.39  )-----------( 220      ( 01 Oct 2022 01:08 )             41.0 ,                       14.5   8.41  )-----------( 234      ( 30 Sep 2022 20:25 )             41.7   Lipid Profile: 10-01 @ 08:00  HDL/Total Cholesterol: --  HDL Chol:              56 mg/dL  Serum Chol:            184 mg/dL  Direct LDL:            --  Triglycerides:         79 mg/dL    TSH: Thyroid Stimulating Hormone, Serum: 0.557 uIU/mL (10-01 @ 08:00)    INR: 1.03 ratio (09-30 @ 20:25)

## 2022-10-03 NOTE — PROGRESS NOTE ADULT - SUBJECTIVE AND OBJECTIVE BOX
Patient is a 81y old  Male who presents with a chief complaint of gi bleed (02 Oct 2022 10:36)      HPI:  This is an 81M hx dm, htn, chronic cholecystitis, prior ETOH abuse quit a few mo ago, presenting due to several episodes of coffee ground emesis per daughter. He also appears more lethargic. Poor historian. on presentation juan j 177/84 and ekg shows new onstet AF 93 bpm. labs significant for normal Hgb 14.5, normal platelets, lactate 2.6, mag 1.5. Ct abd/pelvis unremarkable for acute pathology. denies cp, palpitations, syncope, orthopnea, pnd, h/a, abd pain, diarrhea, hematochezia, melena.      (01 Oct 2022 00:48)      INTERVAL HPI/OVERNIGHT EVENTS:  The patient / nurse deny  melena, hematochezia, hematemesis, nausea, vomiting, abdominal pain, constipation, diarrhea, or change in bowel movements     MEDICATIONS  (STANDING):  amLODIPine   Tablet 5 milliGRAM(s) Oral daily  artificial  tears Solution 1 Drop(s) Both EYES every 6 hours  atorvastatin 10 milliGRAM(s) Oral at bedtime  dextrose 5%. 1000 milliLiter(s) (50 mL/Hr) IV Continuous <Continuous>  dextrose 5%. 1000 milliLiter(s) (100 mL/Hr) IV Continuous <Continuous>  dextrose 50% Injectable 25 Gram(s) IV Push once  dextrose 50% Injectable 12.5 Gram(s) IV Push once  dextrose 50% Injectable 25 Gram(s) IV Push once  folic acid 1 milliGRAM(s) Oral daily  glucagon  Injectable 1 milliGRAM(s) IntraMuscular once  hydrochlorothiazide 25 milliGRAM(s) Oral daily  influenza  Vaccine (HIGH DOSE) 0.7 milliLiter(s) IntraMuscular once  insulin lispro (ADMELOG) corrective regimen sliding scale   SubCutaneous three times a day before meals  losartan 100 milliGRAM(s) Oral daily  multivitamin 1 Tablet(s) Oral daily  pantoprazole  Injectable 40 milliGRAM(s) IV Push two times a day  sodium chloride 0.9% with potassium chloride 20 mEq/L 1000 milliLiter(s) (60 mL/Hr) IV Continuous <Continuous>  thiamine 100 milliGRAM(s) Oral daily    MEDICATIONS  (PRN):  acetaminophen     Tablet .. 650 milliGRAM(s) Oral every 6 hours PRN Temp greater or equal to 38C (100.4F), Mild Pain (1 - 3)  aluminum hydroxide/magnesium hydroxide/simethicone Suspension 30 milliLiter(s) Oral every 4 hours PRN Dyspepsia  dextrose Oral Gel 15 Gram(s) Oral once PRN Blood Glucose LESS THAN 70 milliGRAM(s)/deciliter  meclizine 25 milliGRAM(s) Oral four times a day PRN Dizziness  melatonin 3 milliGRAM(s) Oral at bedtime PRN Insomnia  ondansetron Injectable 4 milliGRAM(s) IV Push every 8 hours PRN Nausea and/or Vomiting      FAMILY HISTORY:  No pertinent family history in first degree relatives        Allergies    No Known Allergies    Intolerances        PMH/PSH:  High blood pressure    Diabetes    High cholesterol          REVIEW OF SYSTEMS:  CONSTITUTIONAL: No fever, weight loss, or fatigue  EYES: No eye pain, visual disturbances, or discharge  ENMT:  No difficulty hearing, tinnitus, vertigo; No sinus or throat pain  NECK: No pain or stiffness  BREASTS: No pain, masses, or nipple discharge  RESPIRATORY: No cough, wheezing, chills or hemoptysis; No shortness of breath  CARDIOVASCULAR: No chest pain, palpitations, dizziness, or leg swelling  GASTROINTESTINAL: No abdominal or epigastric pain. No nausea, vomiting, or hematemesis; No diarrhea or constipation. No melena or hematochezia.  GENITOURINARY: No dysuria, frequency, hematuria, or incontinence  NEUROLOGICAL: No headaches, memory loss, loss of strength, numbness, or tremors  SKIN: No itching, burning, rashes, or lesions   LYMPH NODES: No enlarged glands  ENDOCRINE: No heat or cold intolerance; No hair loss  MUSCULOSKELETAL: No joint pain or swelling; No muscle, back, or extremity pain  PSYCHIATRIC: No depression, anxiety, mood swings, or difficulty sleeping  HEME/LYMPH: No easy bruising, or bleeding gums  ALLERGY AND IMMUNOLOGIC: No hives or eczema    Vital Signs Last 24 Hrs  T(C): 36.7 (03 Oct 2022 11:45), Max: 36.8 (02 Oct 2022 16:37)  T(F): 98.1 (03 Oct 2022 11:45), Max: 98.2 (02 Oct 2022 16:37)  HR: 80 (03 Oct 2022 11:45) (64 - 87)  BP: 159/79 (03 Oct 2022 11:45) (127/89 - 165/89)  BP(mean): --  RR: 17 (03 Oct 2022 11:45) (17 - 18)  SpO2: 97% (03 Oct 2022 11:45) (97% - 98%)    Parameters below as of 03 Oct 2022 11:45  Patient On (Oxygen Delivery Method): room air        PHYSICAL EXAM:  GENERAL: NAD, well-groomed, well-developed  HEAD:  Atraumatic, Normocephalic  EYES: EOMI, PERRLA, conjunctiva and sclera clear  ENMT: No tonsillar erythema, exudates, or enlargement; Moist mucous membranes, Good dentition, No lesions  NECK: Supple, No JVD, Normal thyroid  NERVOUS SYSTEM:  Alert & Oriented X3, Good concentration; Motor Strength 5/5 B/L upper and lower extremities; DTRs 2+ intact and symmetric  CHEST/LUNG: Clear to percussion bilaterally; No rales, rhonchi, wheezing, or rubs  HEART: Regular rate and rhythm; No murmurs, rubs, or gallops  ABDOMEN: Soft, Nontender, Nondistended; Bowel sounds present  EXTREMITIES:  2+ Peripheral Pulses, No clubbing, cyanosis, or edema  LYMPH: No lymphadenopathy noted  SKIN: No rashes or lesions    LAB  10-01 @ 01:08  amylase --   lipase 105                           14.4   6.96  )-----------( 212      ( 03 Oct 2022 06:25 )             42.5       CBC:  10-03 @ 06:25  WBC 6.96   Hgb 14.4   Hct 42.5   Plts 212  MCV 98.2  10-02 @ 06:55  WBC 6.82   Hgb 13.5   Hct 39.3   Plts 224  MCV 95.9  10-01 @ 08:00  WBC 9.58   Hgb 14.1   Hct 40.6   Plts 231  .2  10-01 @ 01:08  WBC 8.39   Hgb 14.2   Hct 41.0   Plts 220  MCV 98.3  09-30 @ 20:25  WBC 8.41   Hgb 14.5   Hct 41.7   Plts 234  MCV 97.7      Chemistry:  10-03 @ 06:25  Na+ 136  K+ 3.7  Cl- 102  CO2 27  BUN 8  Cr 0.91     10-02 @ 06:55  Na+ 135  K+ 3.3  Cl- 101  CO2 24  BUN 7  Cr 0.90     10-01 @ 08:00  Na+ 136  K+ 3.3  Cl- 102  CO2 26  BUN 8  Cr 1.00     09-30 @ 20:25  Na+ 138  K+ 3.8  Cl- 102  CO2 26  BUN 7  Cr 1.07         Glucose, Serum: 110 mg/dL (10-03 @ 06:25)  Glucose, Serum: 122 mg/dL (10-02 @ 06:55)  Glucose, Serum: 116 mg/dL (10-01 @ 08:00)  Glucose, Serum: 176 mg/dL (09-30 @ 20:25)      03 Oct 2022 06:25    136    |  102    |  8      ----------------------------<  110    3.7     |  27     |  0.91   02 Oct 2022 06:55    135    |  101    |  7      ----------------------------<  122    3.3     |  24     |  0.90   01 Oct 2022 08:00    136    |  102    |  8      ----------------------------<  116    3.3     |  26     |  1.00   30 Sep 2022 20:25    138    |  102    |  7      ----------------------------<  176    3.8     |  26     |  1.07     Ca    9.0        03 Oct 2022 06:25  Ca    8.9        02 Oct 2022 06:55  Ca    9.2        01 Oct 2022 08:00  Ca    9.6        30 Sep 2022 20:25  Phos  2.8       03 Oct 2022 06:25  Phos  3.0       02 Oct 2022 06:55  Phos  2.9       01 Oct 2022 08:00  Mg     1.9       03 Oct 2022 06:25  Mg     1.8       02 Oct 2022 06:55  Mg     2.2       01 Oct 2022 08:00  Mg     1.5       30 Sep 2022 20:25    TPro  8.5    /  Alb  3.5    /  TBili  0.9    /  DBili  x      /  AST  13     /  ALT  15     /  AlkPhos  83     01 Oct 2022 08:00  TPro  8.3    /  Alb  3.5    /  TBili  0.8    /  DBili  x      /  AST  11     /  ALT  16     /  AlkPhos  84     30 Sep 2022 20:25              CAPILLARY BLOOD GLUCOSE      POCT Blood Glucose.: 239 mg/dL (03 Oct 2022 10:44)  POCT Blood Glucose.: 106 mg/dL (03 Oct 2022 07:58)  POCT Blood Glucose.: 105 mg/dL (03 Oct 2022 00:27)          RADIOLOGY & ADDITIONAL TESTS:    Imaging Personally Reviewed:  [ ] YES  [ ] NO    Consultant(s) Notes Reviewed:  [ ] YES  [ ] NO    Care Discussed with Consultants/Other Providers [ ] YES  [ ] NO Patient is a 81y old  Male who presents with a chief complaint of gi bleed (02 Oct 2022 10:36)      HPI:  This is an 81M hx dm, htn, chronic cholecystitis, prior ETOH abuse quit a few mo ago, presenting due to several episodes of coffee ground emesis per daughter. He also appears more lethargic. Poor historian. on presentation juan j 177/84 and ekg shows new onstet AF 93 bpm. labs significant for normal Hgb 14.5, normal platelets, lactate 2.6, mag 1.5. Ct abd/pelvis unremarkable for acute pathology. denies cp, palpitations, syncope, orthopnea, pnd, h/a, abd pain, diarrhea, hematochezia, melena.      (01 Oct 2022 00:48)      INTERVAL HPI/OVERNIGHT EVENTS:   Patient communicative but terribly disorientated.   The patient / nurse deny  melena, hematochezia, hematemesis, nausea, vomiting, abdominal pain, constipation, diarrhea, or change in bowel movements     MEDICATIONS  (STANDING):  amLODIPine   Tablet 5 milliGRAM(s) Oral daily  artificial  tears Solution 1 Drop(s) Both EYES every 6 hours  atorvastatin 10 milliGRAM(s) Oral at bedtime  dextrose 5%. 1000 milliLiter(s) (50 mL/Hr) IV Continuous <Continuous>  dextrose 5%. 1000 milliLiter(s) (100 mL/Hr) IV Continuous <Continuous>  dextrose 50% Injectable 25 Gram(s) IV Push once  dextrose 50% Injectable 12.5 Gram(s) IV Push once  dextrose 50% Injectable 25 Gram(s) IV Push once  folic acid 1 milliGRAM(s) Oral daily  glucagon  Injectable 1 milliGRAM(s) IntraMuscular once  hydrochlorothiazide 25 milliGRAM(s) Oral daily  influenza  Vaccine (HIGH DOSE) 0.7 milliLiter(s) IntraMuscular once  insulin lispro (ADMELOG) corrective regimen sliding scale   SubCutaneous three times a day before meals  losartan 100 milliGRAM(s) Oral daily  multivitamin 1 Tablet(s) Oral daily  pantoprazole  Injectable 40 milliGRAM(s) IV Push two times a day  sodium chloride 0.9% with potassium chloride 20 mEq/L 1000 milliLiter(s) (60 mL/Hr) IV Continuous <Continuous>  thiamine 100 milliGRAM(s) Oral daily    MEDICATIONS  (PRN):  acetaminophen     Tablet .. 650 milliGRAM(s) Oral every 6 hours PRN Temp greater or equal to 38C (100.4F), Mild Pain (1 - 3)  aluminum hydroxide/magnesium hydroxide/simethicone Suspension 30 milliLiter(s) Oral every 4 hours PRN Dyspepsia  dextrose Oral Gel 15 Gram(s) Oral once PRN Blood Glucose LESS THAN 70 milliGRAM(s)/deciliter  meclizine 25 milliGRAM(s) Oral four times a day PRN Dizziness  melatonin 3 milliGRAM(s) Oral at bedtime PRN Insomnia  ondansetron Injectable 4 milliGRAM(s) IV Push every 8 hours PRN Nausea and/or Vomiting      FAMILY HISTORY:  No pertinent family history in first degree relatives        Allergies    No Known Allergies    Intolerances        PMH/PSH:  High blood pressure    Diabetes    High cholesterol          REVIEW OF SYSTEMS:  CONSTITUTIONAL: No fever, weight loss, or fatigue  EYES: No eye pain, visual disturbances, or discharge  ENMT:  No difficulty hearing, tinnitus, vertigo; No sinus or throat pain  NECK: No pain or stiffness  BREASTS: No pain, masses, or nipple discharge  RESPIRATORY: No cough, wheezing, chills or hemoptysis; No shortness of breath  CARDIOVASCULAR: No chest pain, palpitations, dizziness, or leg swelling  GASTROINTESTINAL:  see above   GENITOURINARY: No dysuria, frequency, hematuria, or incontinence  NEUROLOGICAL: No headaches, numbness, or tremors  SKIN: No itching, burning, rashes, or lesions   LYMPH NODES: No enlarged glands  ENDOCRINE: No heat or cold intolerance; No hair loss  MUSCULOSKELETAL: No joint pain or swelling; No muscle, back, or extremity pain  PSYCHIATRIC: No depression, anxiety, mood swings, or difficulty sleeping  HEME/LYMPH: No easy bruising, or bleeding gums  ALLERGY AND IMMUNOLOGIC: No hives or eczema    Vital Signs Last 24 Hrs  T(C): 36.7 (03 Oct 2022 11:45), Max: 36.8 (02 Oct 2022 16:37)  T(F): 98.1 (03 Oct 2022 11:45), Max: 98.2 (02 Oct 2022 16:37)  HR: 80 (03 Oct 2022 11:45) (64 - 87)  BP: 159/79 (03 Oct 2022 11:45) (127/89 - 165/89)  BP(mean): --  RR: 17 (03 Oct 2022 11:45) (17 - 18)  SpO2: 97% (03 Oct 2022 11:45) (97% - 98%)    Parameters below as of 03 Oct 2022 11:45  Patient On (Oxygen Delivery Method): room air        PHYSICAL EXAM:  GENERAL: NAD, well-groomed, well-developed  HEAD:  Atraumatic, Normocephalic  EYES: EOMI, PERRLA, conjunctiva and sclera clear  NECK: Supple, No JVD, Normal thyroid  NERVOUS SYSTEM:  Alert & Oriented X 0, Fair concentration; Motor Strength 5/5 B/L upper and lower extremities;   CHEST/LUNG: Clear to percussion bilaterally; No rales, rhonchi, wheezing, or rubs  HEART: Regular rate and rhythm; No murmurs, rubs, or gallops  ABDOMEN: Soft, Nontender, Nondistended; Bowel sounds present  EXTREMITIES:  2+ Peripheral Pulses, No clubbing, cyanosis, or edema  LYMPH: No lymphadenopathy noted  SKIN: No rashes or lesions    LAB  10-01 @ 01:08  amylase --   lipase 105                           14.4   6.96  )-----------( 212      ( 03 Oct 2022 06:25 )             42.5       CBC:  10-03 @ 06:25  WBC 6.96   Hgb 14.4   Hct 42.5   Plts 212  MCV 98.2  10-02 @ 06:55  WBC 6.82   Hgb 13.5   Hct 39.3   Plts 224  MCV 95.9  10-01 @ 08:00  WBC 9.58   Hgb 14.1   Hct 40.6   Plts 231  .2  10-01 @ 01:08  WBC 8.39   Hgb 14.2   Hct 41.0   Plts 220  MCV 98.3  09-30 @ 20:25  WBC 8.41   Hgb 14.5   Hct 41.7   Plts 234  MCV 97.7      Chemistry:  10-03 @ 06:25  Na+ 136  K+ 3.7  Cl- 102  CO2 27  BUN 8  Cr 0.91     10-02 @ 06:55  Na+ 135  K+ 3.3  Cl- 101  CO2 24  BUN 7  Cr 0.90     10-01 @ 08:00  Na+ 136  K+ 3.3  Cl- 102  CO2 26  BUN 8  Cr 1.00     09-30 @ 20:25  Na+ 138  K+ 3.8  Cl- 102  CO2 26  BUN 7  Cr 1.07         Glucose, Serum: 110 mg/dL (10-03 @ 06:25)  Glucose, Serum: 122 mg/dL (10-02 @ 06:55)  Glucose, Serum: 116 mg/dL (10-01 @ 08:00)  Glucose, Serum: 176 mg/dL (09-30 @ 20:25)      03 Oct 2022 06:25    136    |  102    |  8      ----------------------------<  110    3.7     |  27     |  0.91   02 Oct 2022 06:55    135    |  101    |  7      ----------------------------<  122    3.3     |  24     |  0.90   01 Oct 2022 08:00    136    |  102    |  8      ----------------------------<  116    3.3     |  26     |  1.00   30 Sep 2022 20:25    138    |  102    |  7      ----------------------------<  176    3.8     |  26     |  1.07     Ca    9.0        03 Oct 2022 06:25  Ca    8.9        02 Oct 2022 06:55  Ca    9.2        01 Oct 2022 08:00  Ca    9.6        30 Sep 2022 20:25  Phos  2.8       03 Oct 2022 06:25  Phos  3.0       02 Oct 2022 06:55  Phos  2.9       01 Oct 2022 08:00  Mg     1.9       03 Oct 2022 06:25  Mg     1.8       02 Oct 2022 06:55  Mg     2.2       01 Oct 2022 08:00  Mg     1.5       30 Sep 2022 20:25    TPro  8.5    /  Alb  3.5    /  TBili  0.9    /  DBili  x      /  AST  13     /  ALT  15     /  AlkPhos  83     01 Oct 2022 08:00  TPro  8.3    /  Alb  3.5    /  TBili  0.8    /  DBili  x      /  AST  11     /  ALT  16     /  AlkPhos  84     30 Sep 2022 20:25              CAPILLARY BLOOD GLUCOSE      POCT Blood Glucose.: 239 mg/dL (03 Oct 2022 10:44)  POCT Blood Glucose.: 106 mg/dL (03 Oct 2022 07:58)  POCT Blood Glucose.: 105 mg/dL (03 Oct 2022 00:27)          RADIOLOGY & ADDITIONAL TESTS:    Imaging Personally Reviewed:  [ ] YES  [ ] NO    Consultant(s) Notes Reviewed:  [ ] YES  [ ] NO    Care Discussed with Consultants/Other Providers [ ] YES  [ ] NO

## 2022-10-03 NOTE — CONSULT NOTE ADULT - SUBJECTIVE AND OBJECTIVE BOX
Patient seen and examined at bedside. 80 YO M with a sig PMHx of DM, HTN, prior ETOH abuse, admitted with upper GI Bleed. 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 At this time, patient offers no complaints. Denies any abdominal pain, nausea or vomiting.         PMH/PSH:PAST MEDICAL & SURGICAL HISTORY:  High blood pressure      Diabetes      High cholesterol          Allergies:  No Known Allergies      Medications:  acetaminophen     Tablet .. 650 milliGRAM(s) Oral every 6 hours PRN  aluminum hydroxide/magnesium hydroxide/simethicone Suspension 30 milliLiter(s) Oral every 4 hours PRN  amLODIPine   Tablet 5 milliGRAM(s) Oral daily  artificial  tears Solution 1 Drop(s) Both EYES every 6 hours  atorvastatin 10 milliGRAM(s) Oral at bedtime  dextrose 5%. 1000 milliLiter(s) IV Continuous <Continuous>  dextrose 5%. 1000 milliLiter(s) IV Continuous <Continuous>  dextrose 50% Injectable 25 Gram(s) IV Push once  dextrose 50% Injectable 12.5 Gram(s) IV Push once  dextrose 50% Injectable 25 Gram(s) IV Push once  dextrose Oral Gel 15 Gram(s) Oral once PRN  folic acid 1 milliGRAM(s) Oral daily  glucagon  Injectable 1 milliGRAM(s) IntraMuscular once  hydrochlorothiazide 25 milliGRAM(s) Oral daily  influenza  Vaccine (HIGH DOSE) 0.7 milliLiter(s) IntraMuscular once  insulin lispro (ADMELOG) corrective regimen sliding scale   SubCutaneous three times a day before meals  losartan 100 milliGRAM(s) Oral daily  meclizine 25 milliGRAM(s) Oral four times a day PRN  melatonin 3 milliGRAM(s) Oral at bedtime PRN  multivitamin 1 Tablet(s) Oral daily  ondansetron Injectable 4 milliGRAM(s) IV Push every 8 hours PRN  pantoprazole  Injectable 40 milliGRAM(s) IV Push two times a day  sodium chloride 0.9% with potassium chloride 20 mEq/L 1000 milliLiter(s) IV Continuous <Continuous>  thiamine 100 milliGRAM(s) Oral daily      REVIEW OF SYSTEMS:  All other review of systems is negative unless indicated above.    Relevant Family History:   FAMILY HISTORY:  No pertinent family history in first degree relatives        Relevant Social History:  Current tobacco smoker. smokes 1/2 pack daily for 60+years. History of ETOH use    Physical Exam:    Vital Signs:  Vital Signs Last 24 Hrs  T(C): 36.9 (03 Oct 2022 16:48), Max: 36.9 (03 Oct 2022 16:48)  T(F): 98.4 (03 Oct 2022 16:48), Max: 98.4 (03 Oct 2022 16:48)  HR: 83 (03 Oct 2022 16:48) (69 - 87)  BP: 118/70 (03 Oct 2022 16:48) (118/70 - 165/89)  RR: 18 (03 Oct 2022 16:48) (17 - 18)  SpO2: 96% (03 Oct 2022 16:48) (96% - 98%)    Parameters below as of 03 Oct 2022 16:48  Patient On (Oxygen Delivery Method): room air      Daily     Daily     Constitutional: NAD  HEENT: NC/AT, PERRL, EOMI, anicteric sclera, no nasal discharge; uvula midline, no oropharyngeal erythema or exudates  Neck: Supple; no JVD or thyromegaly  Respiratory: CTA B/L; no W/R/R, no retractions  Cardiac: +S1/S2; RRR; no M/R/G; PMI non-displaced  Gastrointestinal: soft, NT/ND; no rebound or guarding; +BS   Extremities: No clubbing or cyanosis; no peripheral edema  Musculoskeletal:  no joint swelling, tenderness or erythema  Vascular: 2+ radial, femoral, DP/PT pulses B/L  Skin: warm, dry and intact; no rashes, wounds, or scars  Neurologic: AAOx3; CNS grossly intact; no focal deficits no asterixis, no tremor, no encephalopathy    Laboratory:                          14.4   6.96  )-----------( 212      ( 03 Oct 2022 06:25 )             42.5     10-03    136  |  102  |  8   ----------------------------<  110<H>  3.7   |  27  |  0.91    Ca    9.0      03 Oct 2022 06:25  Phos  2.8     10-03  Mg     1.9     10-03              Imaging:    < from: CT Abdomen and Pelvis w/ IV Cont (09.30.22 @ 23:05) >    ACC: [id number] EXAM:  CT ABDOMEN AND PELVIS IC                          PROCEDURE DATE:  09/30/2022          INTERPRETATION:  CLINICAL INFORMATION: Abdominal pain    COMPARISON: CT chest abdomen pelvis 8/20/2019. Renal ultrasound 8/29/2019.    CONTRAST/COMPLICATIONS:  IV Contrast: Omnipaque 350  97 cc administered   03 cc discarded  Oral Contrast: NONE  Complications: None reported at time of study completion    PROCEDURE:  CT of the Abdomen and Pelvis was performed.  Sagittal and coronal reformats were performed.    FINDINGS:  LOWER CHEST: Within normal limits.    LIVER: Within normal limits.  BILE DUCTS: Normal caliber.  GALLBLADDER: Contracted limiting evaluation.  SPLEEN: Within normal limits.  PANCREAS: Within normal limits.  ADRENALS: Small right renal calcifications are unchanged.  KIDNEYS/URETERS: 5.5 cm left renal exophytic mildly complex cyst   unchanged from previous imaging.    BLADDER: Urinary bladder is collapsed limiting evaluation however the   bladder wall appears thickened. Correlate clinically UTI/bladder   pathology.  REPRODUCTIVE ORGANS: Prostate within normal limits.    BOWEL: Small hiatal hernia. No bowel obstruction. Appendix is normal.   Colonic diverticulosis without diverticulitis.  PERITONEUM: No ascites.  VESSELS: Atherosclerotic changes.  RETROPERITONEUM/LYMPH NODES: No lymphadenopathy.  ABDOMINAL WALL: Tiny fat-containing umbilical hernia. Small   fat-containing inguinal hernias.  BONES: Degenerative changes.    IMPRESSION:    Urinary bladder is collapsed limiting evaluation however the bladder wall   appears thickened. Correlate clinically UTI/bladder pathology.

## 2022-10-03 NOTE — PROGRESS NOTE ADULT - ASSESSMENT
81-year-old M with hypertension, diabetes, chronic cholecystitis, history of alcohol abuse quit a few months back, admitted with coffee-ground emesis, pt was with lethargy and apparently unable to communicate  as he appears obtunded.  Seen to be in newly recognized atrial fibrillation.   He is seen resting comfortably in bed  today in no acute distress. He is alert and responsive now  H/H remains stable       PLAN:     cont on tele  Continue current antihypertensive regimen of hydrochlorothiazide, amlodipine and losartan for blood pressure management. Can increase amlodipine to 10 if BP remains elevated   Avoid AV mery blockade medications given slow ventricular response of A. fib which needs to be monitored for presence of early sick sinus syndrome on telemetry.    TTE with normal EF  Currently not on AC  Continue GI management.  Plan is for EGD as per GI   81-year-old M with hypertension, diabetes, chronic cholecystitis, history of alcohol abuse quit a few months back, admitted with coffee-ground emesis, pt was with lethargy and apparently unable to communicate  as he appears obtunded.  Seen to be in newly recognized atrial fibrillation.   He is seen resting comfortably in bed  today in no acute distress. He is alert and responsive now  H/H remains stable       PLAN:     cont on tele  Continue current antihypertensive regimen of hydrochlorothiazide, amlodipine and losartan for blood pressure management. Can increase amlodipine to 10 if BP remains elevated   Avoid AV mery blockade medications given slow ventricular response of A. fib which needs to be monitored for presence of early sick sinus syndrome on telemetry.    TTE with normal EF  Currently not on AC, as per GI management.  Plan is for EGD as per GI

## 2022-10-03 NOTE — PROGRESS NOTE ADULT - SUBJECTIVE AND OBJECTIVE BOX
Patient is a 81y old  Male who presents with a chief complaint of gi bleed (02 Oct 2022 10:36)      INTERVAL HPI/OVERNIGHT EVENTS:    MEDICATIONS  (STANDING):  amLODIPine   Tablet 5 milliGRAM(s) Oral daily  artificial  tears Solution 1 Drop(s) Both EYES every 6 hours  atorvastatin 10 milliGRAM(s) Oral at bedtime  dextrose 5%. 1000 milliLiter(s) (50 mL/Hr) IV Continuous <Continuous>  dextrose 5%. 1000 milliLiter(s) (100 mL/Hr) IV Continuous <Continuous>  dextrose 50% Injectable 25 Gram(s) IV Push once  dextrose 50% Injectable 12.5 Gram(s) IV Push once  dextrose 50% Injectable 25 Gram(s) IV Push once  folic acid 1 milliGRAM(s) Oral daily  glucagon  Injectable 1 milliGRAM(s) IntraMuscular once  hydrochlorothiazide 25 milliGRAM(s) Oral daily  influenza  Vaccine (HIGH DOSE) 0.7 milliLiter(s) IntraMuscular once  insulin lispro (ADMELOG) corrective regimen sliding scale   SubCutaneous three times a day before meals  losartan 100 milliGRAM(s) Oral daily  multivitamin 1 Tablet(s) Oral daily  pantoprazole  Injectable 40 milliGRAM(s) IV Push two times a day  sodium chloride 0.9% with potassium chloride 20 mEq/L 1000 milliLiter(s) (60 mL/Hr) IV Continuous <Continuous>  thiamine 100 milliGRAM(s) Oral daily    MEDICATIONS  (PRN):  acetaminophen     Tablet .. 650 milliGRAM(s) Oral every 6 hours PRN Temp greater or equal to 38C (100.4F), Mild Pain (1 - 3)  aluminum hydroxide/magnesium hydroxide/simethicone Suspension 30 milliLiter(s) Oral every 4 hours PRN Dyspepsia  dextrose Oral Gel 15 Gram(s) Oral once PRN Blood Glucose LESS THAN 70 milliGRAM(s)/deciliter  meclizine 25 milliGRAM(s) Oral four times a day PRN Dizziness  melatonin 3 milliGRAM(s) Oral at bedtime PRN Insomnia  ondansetron Injectable 4 milliGRAM(s) IV Push every 8 hours PRN Nausea and/or Vomiting      Allergies    No Known Allergies    Intolerances        Vital Signs Last 24 Hrs  T(C): 36.9 (03 Oct 2022 16:48), Max: 36.9 (03 Oct 2022 16:48)  T(F): 98.4 (03 Oct 2022 16:48), Max: 98.4 (03 Oct 2022 16:48)  HR: 83 (03 Oct 2022 16:48) (72 - 87)  BP: 118/70 (03 Oct 2022 16:48) (118/70 - 165/89)  BP(mean): --  RR: 18 (03 Oct 2022 16:48) (17 - 18)  SpO2: 96% (03 Oct 2022 16:48) (96% - 98%)    Parameters below as of 03 Oct 2022 16:48  Patient On (Oxygen Delivery Method): room air        PHYSICAL EXAM:  GENERAL: NAD, well-groomed, well-developed  HEAD:  Atraumatic, Normocephalic  EYES: EOMI, PERRLA, conjunctiva and sclera clear  ENMT: No tonsillar erythema, exudates, or enlargement; Moist mucous membranes, Good dentition, No lesions  NECK: Supple, No JVD, Normal thyroid  NERVOUS SYSTEM:  Alert & Oriented X3, Good concentration; Motor Strength 5/5 B/L upper and lower extremities; DTRs 2+ intact and symmetric  CHEST/LUNG: Clear to auscultation bilaterally; No rales, rhonchi, wheezing, or rubs  HEART: Regular rate and rhythm; No murmurs, rubs, or gallops  ABDOMEN: Soft, Nontender, Nondistended; Bowel sounds present  EXTREMITIES:  2+ Peripheral Pulses, No clubbing, cyanosis, or edema  LYMPH: No lymphadenopathy noted  SKIN: No rashes or lesions    LABS:                        14.4   6.96  )-----------( 212      ( 03 Oct 2022 06:25 )             42.5     10-03    136  |  102  |  8   ----------------------------<  110<H>  3.7   |  27  |  0.91    Ca    9.0      03 Oct 2022 06:25  Phos  2.8     10-03  Mg     1.9     10-03          CAPILLARY BLOOD GLUCOSE      POCT Blood Glucose.: 103 mg/dL (03 Oct 2022 17:06)  POCT Blood Glucose.: 239 mg/dL (03 Oct 2022 10:44)  POCT Blood Glucose.: 106 mg/dL (03 Oct 2022 07:58)  POCT Blood Glucose.: 105 mg/dL (03 Oct 2022 00:27)      Culture - Urine (collected 01 Oct 2022 01:30)  Source: Clean Catch Clean Catch (Midstream)  Final Report (02 Oct 2022 08:29):    No growth    Culture - Blood (collected 30 Sep 2022 22:10)  Source: .Blood Blood  Preliminary Report (02 Oct 2022 11:00):    No growth to date.    Culture - Blood (collected 30 Sep 2022 22:10)  Source: .Blood Blood  Preliminary Report (02 Oct 2022 11:00):    No growth to date.      RADIOLOGY & ADDITIONAL TESTS:    10-03-22 @ 07:01  -  10-03-22 @ 19:58  --------------------------------------------------------  IN:    Oral Fluid: 1680 mL  Total IN: 1680 mL    OUT:  Total OUT: 0 mL    Total NET: 1680 mL       81M hx DM, HLD, HTN, chronic cholecystitis, prior ETOH abuse p/w several episodes of coffee ground emesis, lactic acidosis and new onstet A. Fib. He is lying in bed in NAD.       MEDICATIONS  (STANDING):  amLODIPine   Tablet 5 milliGRAM(s) Oral daily  artificial  tears Solution 1 Drop(s) Both EYES every 6 hours  atorvastatin 10 milliGRAM(s) Oral at bedtime  dextrose 5%. 1000 milliLiter(s) (50 mL/Hr) IV Continuous <Continuous>  dextrose 5%. 1000 milliLiter(s) (100 mL/Hr) IV Continuous <Continuous>  dextrose 50% Injectable 25 Gram(s) IV Push once  dextrose 50% Injectable 12.5 Gram(s) IV Push once  dextrose 50% Injectable 25 Gram(s) IV Push once  folic acid 1 milliGRAM(s) Oral daily  glucagon  Injectable 1 milliGRAM(s) IntraMuscular once  hydrochlorothiazide 25 milliGRAM(s) Oral daily  influenza  Vaccine (HIGH DOSE) 0.7 milliLiter(s) IntraMuscular once  insulin lispro (ADMELOG) corrective regimen sliding scale   SubCutaneous three times a day before meals  losartan 100 milliGRAM(s) Oral daily  multivitamin 1 Tablet(s) Oral daily  pantoprazole  Injectable 40 milliGRAM(s) IV Push two times a day  sodium chloride 0.9% with potassium chloride 20 mEq/L 1000 milliLiter(s) (60 mL/Hr) IV Continuous <Continuous>  thiamine 100 milliGRAM(s) Oral daily    MEDICATIONS  (PRN):  acetaminophen     Tablet .. 650 milliGRAM(s) Oral every 6 hours PRN Temp greater or equal to 38C (100.4F), Mild Pain (1 - 3)  aluminum hydroxide/magnesium hydroxide/simethicone Suspension 30 milliLiter(s) Oral every 4 hours PRN Dyspepsia  dextrose Oral Gel 15 Gram(s) Oral once PRN Blood Glucose LESS THAN 70 milliGRAM(s)/deciliter  meclizine 25 milliGRAM(s) Oral four times a day PRN Dizziness  melatonin 3 milliGRAM(s) Oral at bedtime PRN Insomnia  ondansetron Injectable 4 milliGRAM(s) IV Push every 8 hours PRN Nausea and/or Vomiting      Allergies    No Known Allergies    Intolerances        Vital Signs Last 24 Hrs  T(C): 36.9 (03 Oct 2022 16:48), Max: 36.9 (03 Oct 2022 16:48)  T(F): 98.4 (03 Oct 2022 16:48), Max: 98.4 (03 Oct 2022 16:48)  HR: 83 (03 Oct 2022 16:48) (72 - 87)  BP: 118/70 (03 Oct 2022 16:48) (118/70 - 165/89)   RR: 18 (03 Oct 2022 16:48) (17 - 18)  SpO2: 96% (03 Oct 2022 16:48) (96% - 98%)    Parameters below as of 03 Oct 2022 16:48  Patient On (Oxygen Delivery Method): room air        PHYSICAL EXAM:  GENERAL: NAD, well-groomed, well-developed  HEAD:  Atraumatic, Normocephalic  EYES: EOMI, PERRLA   NECK: Supple   NERVOUS SYSTEM:  Alert & confused   CHEST/LUNG: Clear to auscultation bilaterally; No rales, rhonchi, wheezing, or rubs  HEART: Regular rate and rhythm; No murmurs, rubs, or gallops  ABDOMEN: Soft, Nontender, Nondistended; Bowel sounds present  EXTREMITIES: No clubbing, cyanosis, or edema    LABS:                        14.4   6.96  )-----------( 212      ( 03 Oct 2022 06:25 )             42.5     10-03    136  |  102  |  8   ----------------------------<  110<H>  3.7   |  27  |  0.91    Ca    9.0      03 Oct 2022 06:25  Phos  2.8     10-03  Mg     1.9     10-03          CAPILLARY BLOOD GLUCOSE      POCT Blood Glucose.: 103 mg/dL (03 Oct 2022 17:06)  POCT Blood Glucose.: 239 mg/dL (03 Oct 2022 10:44)  POCT Blood Glucose.: 106 mg/dL (03 Oct 2022 07:58)  POCT Blood Glucose.: 105 mg/dL (03 Oct 2022 00:27)      Culture - Urine (collected 01 Oct 2022 01:30)  Source: Clean Catch Clean Catch (Midstream)  Final Report (02 Oct 2022 08:29):    No growth    Culture - Blood (collected 30 Sep 2022 22:10)  Source: .Blood Blood  Preliminary Report (02 Oct 2022 11:00):    No growth to date.    Culture - Blood (collected 30 Sep 2022 22:10)  Source: .Blood Blood  Preliminary Report (02 Oct 2022 11:00):    No growth to date.      RADIOLOGY & ADDITIONAL TESTS:    10-03-22 @ 07:01  -  10-03-22 @ 19:58  --------------------------------------------------------  IN:    Oral Fluid: 1680 mL  Total IN: 1680 mL    OUT:  Total OUT: 0 mL    Total NET: 1680 mL

## 2022-10-03 NOTE — PROGRESS NOTE ADULT - NS ATTEND AMEND GEN_ALL_CORE FT
New onset AF, rates adequately controlled.  Increase Amlodipine for persistent elevated BP's.  No cardiac contraindication to planned endoscopy.

## 2022-10-03 NOTE — PROGRESS NOTE ADULT - ASSESSMENT
HPI:  This is an 81M hx dm, htn, chronic cholecystitis, prior ETOH abuse quit a few mo ago, presenting due to several episodes of coffee ground emesis per daughter. He also appears more lethargic. Poor historian. on presentation juan j 177/84 and ekg shows new onstet AF 93 bpm. labs significant for normal Hgb 14.5, normal platelets, lactate 2.6, mag 1.5. Ct abd/pelvis unremarkable for acute pathology. denies cp, palpitations, syncope, orthopnea, pnd, h/a, abd pain, diarrhea, hematochezia, melena.      (01 Oct 2022 00:48)  ----- As Above ----- Lethargic, History obtained from chart, daughter  The patient had been in his USOH until yesterday when he was found by his daughter to be very lethargic and had black material all over him. He then vomited more black material. The last time he vomited black material, he had vomited a small amount of black material in the beginning of the year. The  last time he drank was fathers day weekend. The daughter denies all NSAIDs.  The patient has  " Chronic Cholecystitis. " The daughter states that they have been trying to get a doctor to put in a fistula.       A) Coffee ground emesis - Resolved. R/O PUD, gastritis, gastropathy, etc H/H  14.5 14.2  14.1 13.5 14.4 1) PPI 2) Advance to full liquid diet  3) Will eventually need an EGD 4) Can start anticoagulants if necessary   B) Lethargy - Better,  Ammonia < 10,  Head CT neg  C) " chronic cholecystitis ? - See Ct scan  1) Old records  HPI:  This is an 81M hx dm, htn, chronic cholecystitis, prior ETOH abuse quit a few mo ago, presenting due to several episodes of coffee ground emesis per daughter. He also appears more lethargic. Poor historian. on presentation juan j 177/84 and ekg shows new onstet AF 93 bpm. labs significant for normal Hgb 14.5, normal platelets, lactate 2.6, mag 1.5. Ct abd/pelvis unremarkable for acute pathology. denies cp, palpitations, syncope, orthopnea, pnd, h/a, abd pain, diarrhea, hematochezia, melena.      (01 Oct 2022 00:48)  ----- As Above ----- Lethargic, History obtained from chart, daughter  The patient had been in his USOH until yesterday when he was found by his daughter to be very lethargic and had black material all over him. He then vomited more black material. The last time he vomited black material, he had vomited a small amount of black material in the beginning of the year. The  last time he drank was fathers day weekend. The daughter denies all NSAIDs.  The patient has  " Chronic Cholecystitis. " The daughter states that they have been trying to get a doctor to put in a fistula.       A) Coffee ground emesis - Resolved. R/O PUD, gastritis, gastropathy, etc H/H  14.5 14.2  14.1 13.5 14.4 1) PPI 2) Advance to full liquid diet  3) Will  need an EGD 4) Can start anticoagulants if necessary   B) Lethargy - Better,  Ammonia < 10,  Head CT neg  C) " chronic cholecystitis ? 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 consent obtained.

## 2022-10-03 NOTE — PROGRESS NOTE ADULT - ASSESSMENT
81M hx DM, HLD, HTN, chronic cholecystitis, prior ETOH abuse p/w several episodes of coffee ground emesis, lactic acidosis and new onstet A. Fib.     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 fib  - may be new onset- daughter reports no history of A. fib  - c/w tele monitoring - patient had a 3.13 sec pause on monitor  on 10/3, and has been having 2 second pauses w/ episode of bradycardia and RVR   - cardio note read and appreciated   - no AC as patient had suspected UGIB  - Echo showed EF 60-65%, mildly enlarged left atrium & trace mitral valve regurgitation  - TSH is 0.557    Lethargy  - resolved  - CT head showed a chronic lacunar infarct in the lateral aspect of the right lentiform nucleus but no gross CT evidence of intracranial pathology    Confusion  - suspect dementia - daughter notes that the patient has been progressively more forgetful  - CT head showed a chronic lacunar infarct in the lateral aspect of the right lentiform nucleus but no gross CT evidence of intracranial pathology    Tremors  - unlikely to be alcohol withdrawal - daughter says patient has tremors at baseline      lactic acidosis   - resolved    5.5 cm left renal exophytic mildly complex cyst  - unchanged from previous imaging on 8/20/2019  - will get US    HTN  - c/w Norvasc, Losartan and HCTX    HLD  - c/w Lipitor     DM II  - hgb A1C is 6.4  - c/w ISS     Prophylaxis:  DVT: SCD  GI: Protonix     Spoke w/ daughter at bedside

## 2022-10-04 ENCOUNTER — RESULT REVIEW (OUTPATIENT)
Age: 82
End: 2022-10-04

## 2022-10-04 ENCOUNTER — TRANSCRIPTION ENCOUNTER (OUTPATIENT)
Age: 82
End: 2022-10-04

## 2022-10-04 LAB
ANION GAP SERPL CALC-SCNC: 8 MMOL/L — SIGNIFICANT CHANGE UP (ref 5–17)
BUN SERPL-MCNC: 7 MG/DL — SIGNIFICANT CHANGE UP (ref 7–23)
CALCIUM SERPL-MCNC: 9.4 MG/DL — SIGNIFICANT CHANGE UP (ref 8.5–10.1)
CHLORIDE SERPL-SCNC: 104 MMOL/L — SIGNIFICANT CHANGE UP (ref 96–108)
CO2 SERPL-SCNC: 25 MMOL/L — SIGNIFICANT CHANGE UP (ref 22–31)
CREAT SERPL-MCNC: 0.83 MG/DL — SIGNIFICANT CHANGE UP (ref 0.5–1.3)
EGFR: 88 ML/MIN/1.73M2 — SIGNIFICANT CHANGE UP
GLUCOSE BLDC GLUCOMTR-MCNC: 118 MG/DL — HIGH (ref 70–99)
GLUCOSE BLDC GLUCOMTR-MCNC: 123 MG/DL — HIGH (ref 70–99)
GLUCOSE BLDC GLUCOMTR-MCNC: 141 MG/DL — HIGH (ref 70–99)
GLUCOSE BLDC GLUCOMTR-MCNC: 156 MG/DL — HIGH (ref 70–99)
GLUCOSE SERPL-MCNC: 120 MG/DL — HIGH (ref 70–99)
HCT VFR BLD CALC: 43.1 % — SIGNIFICANT CHANGE UP (ref 39–50)
HGB BLD-MCNC: 14.7 G/DL — SIGNIFICANT CHANGE UP (ref 13–17)
MAGNESIUM SERPL-MCNC: 1.8 MG/DL — SIGNIFICANT CHANGE UP (ref 1.6–2.6)
MCHC RBC-ENTMCNC: 32.8 PG — SIGNIFICANT CHANGE UP (ref 27–34)
MCHC RBC-ENTMCNC: 34.1 G/DL — SIGNIFICANT CHANGE UP (ref 32–36)
MCV RBC AUTO: 96.2 FL — SIGNIFICANT CHANGE UP (ref 80–100)
NRBC # BLD: 0 /100 WBCS — SIGNIFICANT CHANGE UP (ref 0–0)
PHOSPHATE SERPL-MCNC: 3.6 MG/DL — SIGNIFICANT CHANGE UP (ref 2.5–4.5)
PLATELET # BLD AUTO: 221 K/UL — SIGNIFICANT CHANGE UP (ref 150–400)
POTASSIUM SERPL-MCNC: 3.6 MMOL/L — SIGNIFICANT CHANGE UP (ref 3.5–5.3)
POTASSIUM SERPL-SCNC: 3.6 MMOL/L — SIGNIFICANT CHANGE UP (ref 3.5–5.3)
RBC # BLD: 4.48 M/UL — SIGNIFICANT CHANGE UP (ref 4.2–5.8)
RBC # FLD: 13 % — SIGNIFICANT CHANGE UP (ref 10.3–14.5)
SODIUM SERPL-SCNC: 137 MMOL/L — SIGNIFICANT CHANGE UP (ref 135–145)
WBC # BLD: 7.29 K/UL — SIGNIFICANT CHANGE UP (ref 3.8–10.5)
WBC # FLD AUTO: 7.29 K/UL — SIGNIFICANT CHANGE UP (ref 3.8–10.5)

## 2022-10-04 PROCEDURE — 99232 SBSQ HOSP IP/OBS MODERATE 35: CPT

## 2022-10-04 RX ADMIN — Medication 25 MILLIGRAM(S): at 06:15

## 2022-10-04 RX ADMIN — Medication 1 MILLIGRAM(S): at 13:14

## 2022-10-04 RX ADMIN — Medication 100 MILLIGRAM(S): at 13:14

## 2022-10-04 RX ADMIN — AMLODIPINE BESYLATE 10 MILLIGRAM(S): 2.5 TABLET ORAL at 06:15

## 2022-10-04 RX ADMIN — ATORVASTATIN CALCIUM 10 MILLIGRAM(S): 80 TABLET, FILM COATED ORAL at 21:34

## 2022-10-04 RX ADMIN — Medication 1 DROP(S): at 17:26

## 2022-10-04 RX ADMIN — Medication 1 DROP(S): at 13:16

## 2022-10-04 RX ADMIN — DEXTROSE MONOHYDRATE, SODIUM CHLORIDE, AND POTASSIUM CHLORIDE 60 MILLILITER(S): 50; .745; 4.5 INJECTION, SOLUTION INTRAVENOUS at 13:13

## 2022-10-04 RX ADMIN — Medication 1 DROP(S): at 06:16

## 2022-10-04 RX ADMIN — Medication 1 TABLET(S): at 13:15

## 2022-10-04 RX ADMIN — LOSARTAN POTASSIUM 100 MILLIGRAM(S): 100 TABLET, FILM COATED ORAL at 06:15

## 2022-10-04 RX ADMIN — PANTOPRAZOLE SODIUM 40 MILLIGRAM(S): 20 TABLET, DELAYED RELEASE ORAL at 06:14

## 2022-10-04 RX ADMIN — DEXTROSE MONOHYDRATE, SODIUM CHLORIDE, AND POTASSIUM CHLORIDE 60 MILLILITER(S): 50; .745; 4.5 INJECTION, SOLUTION INTRAVENOUS at 06:55

## 2022-10-04 RX ADMIN — PANTOPRAZOLE SODIUM 40 MILLIGRAM(S): 20 TABLET, DELAYED RELEASE ORAL at 17:26

## 2022-10-04 NOTE — BRIEF OPERATIVE NOTE - NSICDXBRIEFPOSTOP_GEN_ALL_CORE_FT
POST-OP DIAGNOSIS:  Gastritis 04-Oct-2022 12:39:45  Louis Burdick  Gastric polyp 04-Oct-2022 12:39:53  Louis Burdick

## 2022-10-04 NOTE — DISCHARGE NOTE NURSING/CASE MANAGEMENT/SOCIAL WORK - PATIENT PORTAL LINK FT
You can access the FollowMyHealth Patient Portal offered by [facility] by registering at the following website: [email or web address]. By joining [facility]’s FollowMyHealth portal, you will also be able to view your health information using other applications (apps) compatible with our system.

## 2022-10-04 NOTE — PROGRESS NOTE ADULT - SUBJECTIVE AND OBJECTIVE BOX
81M hx DM, HLD, HTN, chronic cholecystitis, prior ETOH abuse p/w several episodes of coffee ground emesis, lactic acidosis and new onstet A. Fib. He is lying in bed in NAD.    MEDICATIONS  (STANDING):  amLODIPine   Tablet 10 milliGRAM(s) Oral daily  artificial  tears Solution 1 Drop(s) Both EYES every 6 hours  atorvastatin 10 milliGRAM(s) Oral at bedtime  dextrose 5%. 1000 milliLiter(s) (50 mL/Hr) IV Continuous <Continuous>  dextrose 5%. 1000 milliLiter(s) (100 mL/Hr) IV Continuous <Continuous>  dextrose 50% Injectable 25 Gram(s) IV Push once  dextrose 50% Injectable 12.5 Gram(s) IV Push once  dextrose 50% Injectable 25 Gram(s) IV Push once  folic acid 1 milliGRAM(s) Oral daily  glucagon  Injectable 1 milliGRAM(s) IntraMuscular once  hydrochlorothiazide 25 milliGRAM(s) Oral daily  influenza  Vaccine (HIGH DOSE) 0.7 milliLiter(s) IntraMuscular once  insulin lispro (ADMELOG) corrective regimen sliding scale   SubCutaneous three times a day before meals  losartan 100 milliGRAM(s) Oral daily  multivitamin 1 Tablet(s) Oral daily  pantoprazole  Injectable 40 milliGRAM(s) IV Push two times a day  sodium chloride 0.9% with potassium chloride 20 mEq/L 1000 milliLiter(s) (60 mL/Hr) IV Continuous <Continuous>  thiamine 100 milliGRAM(s) Oral daily    MEDICATIONS  (PRN):  acetaminophen     Tablet .. 650 milliGRAM(s) Oral every 6 hours PRN Temp greater or equal to 38C (100.4F), Mild Pain (1 - 3)  aluminum hydroxide/magnesium hydroxide/simethicone Suspension 30 milliLiter(s) Oral every 4 hours PRN Dyspepsia  dextrose Oral Gel 15 Gram(s) Oral once PRN Blood Glucose LESS THAN 70 milliGRAM(s)/deciliter  meclizine 25 milliGRAM(s) Oral four times a day PRN Dizziness  melatonin 3 milliGRAM(s) Oral at bedtime PRN Insomnia  ondansetron Injectable 4 milliGRAM(s) IV Push every 8 hours PRN Nausea and/or Vomiting      Allergies    No Known Allergies    Intolerances        Vital Signs Last 24 Hrs  T(C): 36.6 (04 Oct 2022 15:22), Max: 36.9 (04 Oct 2022 00:50)  T(F): 97.8 (04 Oct 2022 15:22), Max: 98.5 (04 Oct 2022 00:50)  HR: 84 (04 Oct 2022 15:22) (68 - 84)  BP: 117/75 (04 Oct 2022 15:22) (111/75 - 153/82)  BP(mean): --  RR: 18 (04 Oct 2022 15:22) (16 - 22)  SpO2: 97% (04 Oct 2022 15:22) (96% - 100%)    Parameters below as of 04 Oct 2022 15:22  Patient On (Oxygen Delivery Method): room air        PHYSICAL EXAM:  GENERAL: NAD, well-groomed, well-developed  HEAD:  Atraumatic, Normocephalic  EYES: EOMI, PERRLA   NECK: Supple   NERVOUS SYSTEM:  Alert & confused   CHEST/LUNG: Clear to auscultation bilaterally; No rales, rhonchi, wheezing, or rubs  HEART: Regular rate and rhythm; No murmurs, rubs, or gallops  ABDOMEN: Soft, Nontender, Nondistended; Bowel sounds present  EXTREMITIES: No clubbing, cyanosis, or edema      LABS:                        14.7   7.29  )-----------( 221      ( 04 Oct 2022 05:45 )             43.1     10-04    137  |  104  |  7   ----------------------------<  120<H>  3.6   |  25  |  0.83    Ca    9.4      04 Oct 2022 05:45  Phos  3.6     10-04  Mg     1.8     10-04          CAPILLARY BLOOD GLUCOSE      POCT Blood Glucose.: 141 mg/dL (04 Oct 2022 16:05)  POCT Blood Glucose.: 123 mg/dL (04 Oct 2022 10:47)  POCT Blood Glucose.: 118 mg/dL (04 Oct 2022 06:00)  POCT Blood Glucose.: 145 mg/dL (03 Oct 2022 23:06)      Culture - Urine (collected 01 Oct 2022 01:30)  Source: Clean Catch Clean Catch (Midstream)  Final Report (02 Oct 2022 08:29):    No growth    Culture - Blood (collected 30 Sep 2022 22:10)  Source: .Blood Blood  Preliminary Report (02 Oct 2022 11:00):    No growth to date.    Culture - Blood (collected 30 Sep 2022 22:10)  Source: .Blood Blood  Preliminary Report (02 Oct 2022 11:00):    No growth to date.      RADIOLOGY & ADDITIONAL TESTS:    10-03-22 @ 07:01  -  10-04-22 @ 07:00  --------------------------------------------------------  IN:    Oral Fluid: 1680 mL  Total IN: 1680 mL    OUT:  Total OUT: 0 mL    Total NET: 1680 mL

## 2022-10-04 NOTE — PROGRESS NOTE ADULT - SUBJECTIVE AND OBJECTIVE BOX
Procedure:           Upper GI endoscopy with biopsy 10-04-22 @ 12:33    Indications:                     Monitored Anesthesia Care Provided by :     ____________________________________________________________________________________________________  Procedure:           Pre-Anesthesia Assessment:                       - Prior to the procedure, a History and Physical was performed, and patient                        medications and allergies were reviewed. The patient is not competent. The risks                        and benefits of the procedure and the sedation options and risks were                        discussed with the patient's daughter. All questions were answered and informed consent                        was obtained. Patient identification and proposed procedure were verified by                        the physician, the nurse and the anesthesiologist in the procedure room.                        Mental Status Examination:   alert and oriented. Airway Examination: normal                        oropharyngeal airway and neck mobility. Respiratory Examination: clear to                        auscultation. CV Examination: normal. Prophylactic Antibiotics:  The patient                        does not require prophylactic antibiotics.                        Grade Assessment:  After                        reviewing the risks and benefits, the patient was deemed in satisfactory                        condition to undergo the procedure. The anesthesia plan was to use monitored                        anesthesia care (MAC). Immediately prior to administration of medications,                        the patient was re-assessed for adequacy to receive sedatives. The heart        		     rate, respiratory rate, oxygen saturations, blood pressure, adequacy of                        pulmonary ventilation, and response to care were monitored throughout the                        procedure. The physical status of the patient was re-assessed after the                        procedure.                       After obtaining informed consent, the endoscope was passed under direct                        vision. Throughout the procedure, the patient's blood pressure, pulse, and                        oxygen saturations were monitored continuously. The Endoscope was introduced                        through the mouth, and advanced to the second part of duodenum. Retroflexion was done in the stomach. The upper GI                        endoscopy was accomplished with ease. The patient tolerated the procedure                        well.    ESOPHAGUS:     WNL    STOMACH:  6mm polyp VS submucosal nodule s/p biopsy mild antral gastritis s/p biopsy    DUODENUM:  WNL      Assessment : As Above     PLAN :  Check histology   Procedure:           Upper GI endoscopy with biopsy 10-04-22 @ 12:33    Indications:                     Monitored Anesthesia Care Provided by :     ____________________________________________________________________________________________________  Procedure:           Pre-Anesthesia Assessment:                       - Prior to the procedure, a History and Physical was performed, and patient                        medications and allergies were reviewed. The patient is not competent. The risks                        and benefits of the procedure and the sedation options and risks were                        discussed with the patient's daughter. All questions were answered and informed consent                        was obtained. Patient identification and proposed procedure were verified by                        the physician, the nurse and the anesthesiologist in the procedure room.                        Mental Status Examination:   alert and oriented. Airway Examination: normal                        oropharyngeal airway and neck mobility. Respiratory Examination: clear to                        auscultation. CV Examination: normal. Prophylactic Antibiotics:  The patient                        does not require prophylactic antibiotics.                        Grade Assessment:  After                        reviewing the risks and benefits, the patient was deemed in satisfactory                        condition to undergo the procedure. The anesthesia plan was to use monitored                        anesthesia care (MAC). Immediately prior to administration of medications,                        the patient was re-assessed for adequacy to receive sedatives. The heart        		     rate, respiratory rate, oxygen saturations, blood pressure, adequacy of                        pulmonary ventilation, and response to care were monitored throughout the                        procedure. The physical status of the patient was re-assessed after the                        procedure.                       After obtaining informed consent, the endoscope was passed under direct                        vision. Throughout the procedure, the patient's blood pressure, pulse, and                        oxygen saturations were monitored continuously. The Endoscope was introduced                        through the mouth, and advanced to the second part of duodenum. Retroflexion was done in the stomach. The upper GI                        endoscopy was accomplished with ease. The patient tolerated the procedure                        well.    ESOPHAGUS:     WNL    STOMACH:  6mm polyp VS submucosal nodule s/p biopsy mild antral gastritis s/p biopsy    DUODENUM:  WNL      Assessment : As Above     PLAN :  Check histology, may restart anticoagulation

## 2022-10-04 NOTE — PROGRESS NOTE ADULT - SUBJECTIVE AND OBJECTIVE BOX
SURGERY PROGRESS HPI:  Pt seen and examined at bedside. Denies pain or complaints. Pt tolerating diet. Pt denies nausea and vomiting. BM/flatus. Pt denies chest pain, SOB, dizziness, fever, chills.     Vital Signs Last 24 Hrs  T(C): 36.6 (04 Oct 2022 15:22), Max: 36.9 (04 Oct 2022 00:50)  T(F): 97.8 (04 Oct 2022 15:22), Max: 98.5 (04 Oct 2022 00:50)  HR: 84 (04 Oct 2022 15:22) (68 - 84)  BP: 117/75 (04 Oct 2022 15:22) (111/75 - 153/82)  BP(mean): --  RR: 18 (04 Oct 2022 15:22) (16 - 22)  SpO2: 97% (04 Oct 2022 15:22) (96% - 100%)    Parameters below as of 04 Oct 2022 15:22  Patient On (Oxygen Delivery Method): room air        PHYSICAL EXAM:    CONSTITUTIONAL: NAD  HEENT:  Atraumatic, Normocephalic  RESPIRATORY: Clear to ausculation, bilaterally   CARDIOVASCULAR: RRR S1S2  GASTROINTESTINAL: non distended, +BS, soft, non tender, no guarding  MUSCULOSKELETAL: calf soft, non tender b/l    I&O's Detail    03 Oct 2022 07:01  -  04 Oct 2022 07:00  --------------------------------------------------------  IN:    Oral Fluid: 1680 mL  Total IN: 1680 mL    OUT:  Total OUT: 0 mL    Total NET: 1680 mL          LABS:                        14.7   7.29  )-----------( 221      ( 04 Oct 2022 05:45 )             43.1     10-04    137  |  104  |  7   ----------------------------<  120<H>  3.6   |  25  |  0.83    Ca    9.4      04 Oct 2022 05:45  Phos  3.6     10-04  Mg     1.8     10-04        82 YO M with a sig PMHx of DM, HTN, prior ETOH abuse, admitted with upper GI Bleed. 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 Current scan shows collapsed gallbladder.   EGD today: 6mm polyp VS submucosal nodule s/p biopsy mild antral gastritis s/p biopsy    No acute surgical intervention  Continue management per GI and medicine

## 2022-10-04 NOTE — PROGRESS NOTE ADULT - ASSESSMENT
81M hx DM, HLD, HTN, chronic cholecystitis, prior ETOH abuse p/w several episodes of coffee ground emesis, lactic acidosis and new onstet A. Fib.     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 fib  - may be new onset- daughter reports no history of A. fib  - c/w tele monitoring - patient had a 3.13 sec pause on monitor on 10/3, and has been having 2 second pauses w/ episode of bradycardia and RVR   - cardio note read and appreciated   - patient cleared to start AC as per GI; will start AC when OK w/ surgery  - Echo showed EF 60-65%, mildly enlarged left atrium & trace mitral valve regurgitation  - TSH is 0.557    Lethargy  - resolved  - CT head showed a chronic lacunar infarct in the lateral aspect of the right lentiform nucleus but no gross CT evidence of intracranial pathology    Confusion  - suspect dementia - daughter notes that the patient has been progressively more forgetful  - CT head showed a chronic lacunar infarct in the lateral aspect of the right lentiform nucleus but no gross CT evidence of intracranial pathology    Tremors  - unlikely to be alcohol withdrawal - daughter says patient has tremors at baseline      lactic acidosis   - resolved    5.5 cm left renal exophytic mildly complex cyst  - unchanged from previous imaging on 8/20/2019  - will get US    HTN  - c/w Norvasc, Losartan and HCTX    HLD  - c/w Lipitor     DM II  - hgb A1C is 6.4  - c/w ISS     Prophylaxis:  DVT: SCD  GI: Protonix     Spoke w/ daughter at bedside

## 2022-10-05 ENCOUNTER — TRANSCRIPTION ENCOUNTER (OUTPATIENT)
Age: 82
End: 2022-10-05

## 2022-10-05 VITALS
RESPIRATION RATE: 16 BRPM | DIASTOLIC BLOOD PRESSURE: 79 MMHG | SYSTOLIC BLOOD PRESSURE: 141 MMHG | TEMPERATURE: 98 F | HEART RATE: 97 BPM | OXYGEN SATURATION: 97 %

## 2022-10-05 LAB
FLUAV AG NPH QL: SIGNIFICANT CHANGE UP
FLUBV AG NPH QL: SIGNIFICANT CHANGE UP
GLUCOSE BLDC GLUCOMTR-MCNC: 123 MG/DL — HIGH (ref 70–99)
GLUCOSE BLDC GLUCOMTR-MCNC: 137 MG/DL — HIGH (ref 70–99)
GLUCOSE BLDC GLUCOMTR-MCNC: 140 MG/DL — HIGH (ref 70–99)
SARS-COV-2 RNA SPEC QL NAA+PROBE: SIGNIFICANT CHANGE UP

## 2022-10-05 PROCEDURE — 99239 HOSP IP/OBS DSCHRG MGMT >30: CPT

## 2022-10-05 RX ORDER — FOLIC ACID 0.8 MG
1 TABLET ORAL
Qty: 0 | Refills: 0 | DISCHARGE
Start: 2022-10-05

## 2022-10-05 RX ORDER — FLUTICASONE PROPIONATE 50 MCG
1 SPRAY, SUSPENSION NASAL
Qty: 0 | Refills: 0 | DISCHARGE

## 2022-10-05 RX ORDER — THIAMINE MONONITRATE (VIT B1) 100 MG
1 TABLET ORAL
Qty: 0 | Refills: 0 | DISCHARGE
Start: 2022-10-05

## 2022-10-05 RX ORDER — APIXABAN 2.5 MG/1
1 TABLET, FILM COATED ORAL
Qty: 30 | Refills: 0
Start: 2022-10-05 | End: 2022-11-03

## 2022-10-05 RX ORDER — LOSARTAN POTASSIUM 100 MG/1
1 TABLET, FILM COATED ORAL
Qty: 0 | Refills: 0 | DISCHARGE
Start: 2022-10-05

## 2022-10-05 RX ADMIN — Medication 1 TABLET(S): at 11:59

## 2022-10-05 RX ADMIN — Medication 1 DROP(S): at 11:58

## 2022-10-05 RX ADMIN — Medication 1 DROP(S): at 00:01

## 2022-10-05 RX ADMIN — Medication 1 MILLIGRAM(S): at 11:59

## 2022-10-05 RX ADMIN — Medication 1 DROP(S): at 05:30

## 2022-10-05 RX ADMIN — PANTOPRAZOLE SODIUM 40 MILLIGRAM(S): 20 TABLET, DELAYED RELEASE ORAL at 05:31

## 2022-10-05 RX ADMIN — Medication 25 MILLIGRAM(S): at 05:30

## 2022-10-05 RX ADMIN — Medication 100 MILLIGRAM(S): at 11:59

## 2022-10-05 RX ADMIN — ATORVASTATIN CALCIUM 10 MILLIGRAM(S): 80 TABLET, FILM COATED ORAL at 21:14

## 2022-10-05 RX ADMIN — DEXTROSE MONOHYDRATE, SODIUM CHLORIDE, AND POTASSIUM CHLORIDE 60 MILLILITER(S): 50; .745; 4.5 INJECTION, SOLUTION INTRAVENOUS at 01:05

## 2022-10-05 RX ADMIN — LOSARTAN POTASSIUM 100 MILLIGRAM(S): 100 TABLET, FILM COATED ORAL at 05:30

## 2022-10-05 RX ADMIN — AMLODIPINE BESYLATE 10 MILLIGRAM(S): 2.5 TABLET ORAL at 05:30

## 2022-10-05 NOTE — DISCHARGE NOTE PROVIDER - CARE PROVIDER_API CALL
Ross Cali)  Cardiology; Cardiovascular Disease; Nuclear Cardiology  [address], [address]  [place] [address]  Phone: [phone]  Fax: [phone]  Follow Up Time: 1 week    [place] [address]  Phone: [phone]  Fax: [phone]  Follow Up Time: 1 week

## 2022-10-05 NOTE — PROGRESS NOTE ADULT - ASSESSMENT
81-year-old M with hypertension, diabetes, chronic cholecystitis, history of alcohol abuse quit a few months back, admitted with coffee-ground emesis, pt was with lethargy and apparently unable to communicate  as he appears obtunded.  Seen to be in newly recognized atrial fibrillation.   He is seen resting comfortably in bed  today in no acute distress. He is alert and responsive now  H/H remains stable       PLAN:     Continue current antihypertensive regimen of hydrochlorothiazide, amlodipine and losartan for blood pressure management. Can increase amlodipine to 10 if BP remains elevated   Avoid AV mery blockade medications given slow ventricular response of A. fib which needs to be monitored for presence of early sick sinus syndrome on telemetry.    TTE with normal EF  S/P EGD  Cleared for AC as per GI and surgery.   Outpatient cardiology follow up within 1-2 weeks.   81-year-old M with hypertension, diabetes, chronic cholecystitis, history of alcohol abuse quit a few months back, admitted with coffee-ground emesis, pt was with lethargy and apparently unable to communicate  as he appears obtunded.  Seen to be in newly recognized atrial fibrillation.   He is seen resting comfortably in bed  today in no acute distress. He is alert and responsive now  H/H remains stable       PLAN:     Continue current antihypertensive regimen of hydrochlorothiazide, amlodipine and losartan for blood pressure management. Can increase amlodipine to 10 if BP remains elevated   Avoid AV mery blockade medications given slow ventricular response of A. fib which needs to be monitored for presence of early sick sinus syndrome on telemetry.    TTE with normal EF  S/P EGD  Cleared for AC as per GI   Outpatient cardiology follow up within 1-2 weeks.

## 2022-10-05 NOTE — DISCHARGE NOTE PROVIDER - NSDCCPCAREPLAN_GEN_ALL_CORE_FT
PRINCIPAL DISCHARGE DIAGNOSIS  Diagnosis: Coffee ground emesis  Assessment and Plan of Treatment: you have been cleared by GI and Surgery to resume anticoagulation  please start eliquis 5mg BID  please followup with Dr. Burdick in one/two weeks

## 2022-10-05 NOTE — PROGRESS NOTE ADULT - SUBJECTIVE AND OBJECTIVE BOX
Patient is a 81y old  Male who presents with a chief complaint of gi bleed (02 Oct 2022 10:36)    PAST MEDICAL & SURGICAL HISTORY:  High blood pressure    Diabetes    High cholesterol    Cholecystitis    ETOH    INTERVAL HISTORY: Pt resting comfortably and in no acute distress, no complaint of chest pain or SOB.  	  MEDICATIONS:  MEDICATIONS  (STANDING):  amLODIPine   Tablet 10 milliGRAM(s) Oral daily  artificial  tears Solution 1 Drop(s) Both EYES every 6 hours  atorvastatin 10 milliGRAM(s) Oral at bedtime  folic acid 1 milliGRAM(s) Oral daily  glucagon  Injectable 1 milliGRAM(s) IntraMuscular once  hydrochlorothiazide 25 milliGRAM(s) Oral daily  influenza  Vaccine (HIGH DOSE) 0.7 milliLiter(s) IntraMuscular once  insulin lispro (ADMELOG) corrective regimen sliding scale   SubCutaneous three times a day before meals  losartan 100 milliGRAM(s) Oral daily  multivitamin 1 Tablet(s) Oral daily  pantoprazole  Injectable 40 milliGRAM(s) IV Push two times a day  sodium chloride 0.9% with potassium chloride 20 mEq/L 1000 milliLiter(s) (60 mL/Hr) IV Continuous <Continuous>  thiamine 100 milliGRAM(s) Oral daily    MEDICATIONS  (PRN):  acetaminophen     Tablet .. 650 milliGRAM(s) Oral every 6 hours PRN Temp greater or equal to 38C (100.4F), Mild Pain (1 - 3)  aluminum hydroxide/magnesium hydroxide/simethicone Suspension 30 milliLiter(s) Oral every 4 hours PRN Dyspepsia  dextrose Oral Gel 15 Gram(s) Oral once PRN Blood Glucose LESS THAN 70 milliGRAM(s)/deciliter  meclizine 25 milliGRAM(s) Oral four times a day PRN Dizziness  melatonin 3 milliGRAM(s) Oral at bedtime PRN Insomnia  ondansetron Injectable 4 milliGRAM(s) IV Push every 8 hours PRN Nausea and/or Vomiting    Vitals:  T(F): 97.6 (10-05-22 @ 10:52), Max: 98 (10-04-22 @ 23:47)  HR: 78 (10-05-22 @ 10:52) (72 - 92)  BP: 135/85 (10-05-22 @ 10:52) (117/75 - 148/96)  RR: 18 (10-05-22 @ 10:52) (18 - 18)  SpO2: 97% (10-05-22 @ 10:52) (97% - 97%)    PHYSICAL EXAM:  Neuro: Awake, responsive  CV: S1 S2 irregularly irregular  Lungs: CTABL  GI: Soft, BS +, ND, NT  Extremities: Trace LE edema    TELEMETRY: afib  	    RADIOLOGY: < from: CT Abdomen and Pelvis w/ IV Cont (09.30.22 @ 23:05) >  LOWER CHEST: Within normal limits.    LIVER: Within normal limits.  BILE DUCTS: Normal caliber.  GALLBLADDER: Contracted limiting evaluation.  SPLEEN: Within normal limits.  PANCREAS: Within normal limits.  ADRENALS: Small right renal calcifications are unchanged.  KIDNEYS/URETERS: 5.5 cm left renal exophytic mildly complex cyst   unchanged from previous imaging.    BLADDER: Urinary bladder is collapsed limiting evaluation however the   bladder wall appears thickened. Correlate clinically UTI/bladder   pathology.  REPRODUCTIVE ORGANS: Prostate within normal limits.    BOWEL: Small hiatal hernia. No bowel obstruction. Appendix is normal.   Colonic diverticulosis without diverticulitis.  PERITONEUM: No ascites.  VESSELS: Atherosclerotic changes.  RETROPERITONEUM/LYMPH NODES: No lymphadenopathy.  ABDOMINAL WALL: Tiny fat-containing umbilical hernia. Small   fat-containing inguinal hernias.  BONES: Degenerative changes.    IMPRESSION:    Urinary bladder is collapsed limiting evaluation however the bladder wall   appears thickened. Correlate clinically UTI/bladder pathology.    DIAGNOSTIC TESTING:    [x ] Echocardiogram: < from: TTE Echo Complete w/o Contrast w/ Doppler (10.01.22 @ 13:12) >  Left Ventricle: The left ventricular internal cavity size is normal.  Global LV systolic function was normal. Left ventricular ejection   fraction, by visual estimation, is 60 to 65%. The mitral in-flow pattern   reveals no discernable A-wave, therefore no comment on diastolic function   can be made.  Right Ventricle: Normal right ventricular size and function.  Left Atrium: Mildly enlarged left atrium.  Right Atrium: Normal right atrial size.  Pericardium: There is no evidence of pericardial effusion.  Mitral Valve: Structurally normal mitral valve, with normal leaflet   excursion. Trace mitral valve regurgitation is seen.  Tricuspid Valve: Structurally normal tricuspid valve, with normal leaflet   excursion. The tricuspid valve is normal in structure. Mild tricuspid   regurgitation is visualized.  Aortic Valve: The aortic valve was not well visualized. Sclerotic aortic   valve with normal opening. No evidence of aortic valve regurgitation is   seen.  Pulmonic Valve: The pulmonic valve was not well visualized. No indication   of pulmonic valve regurgitation.  Aorta: Aortic root measured at Sinus of Valsalva is normal.  Venous: The inferior vena cava was normal sized, with respiratory size   variation greater than 50%.      Summary:   1. Left ventricular ejection fraction, by visual estimation, is 60 to   65%.   2. Technically adequate study.   3. Normal global left ventricular systolic function.   4. Normal left ventricular internal cavity size.   5. The mitral in-flow pattern reveals no discernable A-wave, therefore   no comment on diastolic function can be made.   6. There is mild concentric left ventricular hypertrophy.   7. Normal right ventricular size and function.   8. Mildly enlarged left atrium.   9. Normal right atrial size.  10. There is no evidence of pericardial effusion.  11. Trace mitral valve regurgitation.  12. Mild tricuspid regurgitation.  13. Sclerotic aortic valve with normal opening.    < end of copied text >    LABS:	 	    04 Oct 2022 05:45    137    |  104    |  7      ----------------------------<  120    3.6     |  25     |  0.83   03 Oct 2022 06:25    136    |  102    |  8      ----------------------------<  110    3.7     |  27     |  0.91     Ca    9.4        04 Oct 2022 05:45  Phos  3.6       04 Oct 2022 05:45  Mg     1.8       04 Oct 2022 05:45                    14.7   7.29  )-----------( 221      ( 04 Oct 2022 05:45 )             43.1 ,                       14.4   6.96  )-----------( 212      ( 03 Oct 2022 06:25 )             42.5   Lipid Profile: 10-01 @ 08:00  HDL/Total Cholesterol: --  HDL Chol:              56 mg/dL  Serum Chol:            184 mg/dL  Direct LDL:            --  Triglycerides:         79 mg/dL    TSH: Thyroid Stimulating Hormone, Serum: 0.557 uIU/mL (10-01 @ 08:00)    INR: 1.03 ratio (09-30 @ 20:25)

## 2022-10-05 NOTE — PROGRESS NOTE ADULT - PROVIDER SPECIALTY LIST ADULT
Gastroenterology
Gastroenterology
Hospitalist
Surgery
Cardiology
Gastroenterology
Hospitalist
Cardiology

## 2022-10-05 NOTE — DISCHARGE NOTE PROVIDER - PROVIDER TOKENS
PROVIDER:[TOKEN:[[id number]],FOLLOWUP:[1 week]],PROVIDER:[TOKEN:[[id number]],FOLLOWUP:[1 week]]

## 2022-10-05 NOTE — DISCHARGE NOTE PROVIDER - NSDCHC_MEDRECSTATUS_GEN_ALL_CORE
Francesco Ivey with Dr. Melissa Haile office called and wanted to let the doctor know that the pt is having a Hybrid Ablation at [facility] on the 8th.
Admission Reconciliation is Not Complete  Discharge Reconciliation is Completed

## 2022-10-05 NOTE — DISCHARGE NOTE PROVIDER - ATTENDING DISCHARGE PHYSICAL EXAMINATION:
Objective:    Vitals:  T(C): 36.4 (10-05-22 @ 10:52), Max: 36.7 (10-04-22 @ 23:47)  HR: 78 (10-05-22 @ 10:52) (69 - 92)  BP: 135/85 (10-05-22 @ 10:52) (117/75 - 148/96)  RR: 18 (10-05-22 @ 10:52) (17 - 18)  SpO2: 97% (10-05-22 @ 10:52) (97% - 97%)    Physical Exam:  General: comfortable, no acute distress, well nourished  HEENT: Atraumatic, no LAD, trachea midline, PERRLA  Cardiovascular: normal s1s2, no murmurs, gallops or fricition rubs  Pulmonary: clear to ausculation Bilaterally, no wheezing , rhonchi  Gastrointestinal: soft non tender non distended, no masses felt, no organomegally  Muscloskeletal: no lower extremity edema, intact bilateral lower extremity pulses  Neurological: CN II-12 intact. No focal weakness  Psychiatrical: normal mood, cooperative  SKIN: no rash, lesions or ulcers

## 2022-10-05 NOTE — DISCHARGE NOTE PROVIDER - NSDCMRMEDTOKEN_GEN_ALL_CORE_FT
amLODIPine 5 mg oral tablet: 1 tab(s) orally once a day  atorvastatin 10 mg oral tablet: 1 tab(s) orally once a day (at bedtime)  Eliquis 5 mg oral tablet: 1 tab(s) orally once a day   folic acid 1 mg oral tablet: 1 tab(s) orally once a day  indomethacin 75 mg oral capsule, extended release: 1 cap(s) orally once a day  losartan 100 mg oral tablet: 1 tab(s) orally once a day  losartan-hydroCHLOROthiazide 100 mg-25 mg oral tablet: 1 tab(s) orally once a day  meclizine 25 mg oral tablet: 1 tab(s) orally 2 times a day  metFORMIN: 500  orally 2 times a day  Multiple Vitamins oral tablet: 1 tab(s) orally once a day  ocular lubricant ophthalmic solution: 1 drop(s) to each affected eye every 6 hours  omeprazole 40 mg oral delayed release capsule: 1 cap(s) orally once a day  primidone 50 mg oral tablet: 50 milligram(s) orally once a day  renal ultrasound: b/l renal ultrasound  Dx: Renal cysts  thiamine 100 mg oral tablet: 1 tab(s) orally once a day

## 2022-10-06 LAB
CULTURE RESULTS: SIGNIFICANT CHANGE UP
CULTURE RESULTS: SIGNIFICANT CHANGE UP
SPECIMEN SOURCE: SIGNIFICANT CHANGE UP
SPECIMEN SOURCE: SIGNIFICANT CHANGE UP

## 2022-10-10 DIAGNOSIS — E78.00 PURE HYPERCHOLESTEROLEMIA, UNSPECIFIED: ICD-10-CM

## 2022-10-10 DIAGNOSIS — E11.9 TYPE 2 DIABETES MELLITUS WITHOUT COMPLICATIONS: ICD-10-CM

## 2022-10-10 DIAGNOSIS — K81.1 CHRONIC CHOLECYSTITIS: ICD-10-CM

## 2022-10-10 DIAGNOSIS — E87.20 ACIDOSIS, UNSPECIFIED: ICD-10-CM

## 2022-10-10 DIAGNOSIS — R25.1 TREMOR, UNSPECIFIED: ICD-10-CM

## 2022-10-10 DIAGNOSIS — F10.10 ALCOHOL ABUSE, UNCOMPLICATED: ICD-10-CM

## 2022-10-10 DIAGNOSIS — E87.6 HYPOKALEMIA: ICD-10-CM

## 2022-10-10 DIAGNOSIS — N28.1 CYST OF KIDNEY, ACQUIRED: ICD-10-CM

## 2022-10-10 DIAGNOSIS — I48.91 UNSPECIFIED ATRIAL FIBRILLATION: ICD-10-CM

## 2022-10-10 DIAGNOSIS — K92.2 GASTROINTESTINAL HEMORRHAGE, UNSPECIFIED: ICD-10-CM

## 2022-10-10 DIAGNOSIS — F03.90 UNSPECIFIED DEMENTIA, UNSPECIFIED SEVERITY, WITHOUT BEHAVIORAL DISTURBANCE, PSYCHOTIC DISTURBANCE, MOOD DISTURBANCE, AND ANXIETY: ICD-10-CM

## 2022-10-10 DIAGNOSIS — I10 ESSENTIAL (PRIMARY) HYPERTENSION: ICD-10-CM

## 2022-10-10 DIAGNOSIS — E83.42 HYPOMAGNESEMIA: ICD-10-CM

## 2023-01-18 ENCOUNTER — APPOINTMENT (OUTPATIENT)
Dept: CARDIOLOGY | Facility: CLINIC | Age: 83
End: 2023-01-18

## 2023-03-21 NOTE — PATIENT PROFILE ADULT - TRANSPORTATION
Order was written/H&P was completed/Contractions pattern was reviewed/FHR was reviewed/Induction / Augmentation was discussed
no

## 2023-04-05 ENCOUNTER — INPATIENT (INPATIENT)
Facility: HOSPITAL | Age: 83
LOS: 5 days | Discharge: NURSING, REHAB OR LONG-TERM CARE | End: 2023-04-11
Attending: STUDENT IN AN ORGANIZED HEALTH CARE EDUCATION/TRAINING PROGRAM | Admitting: STUDENT IN AN ORGANIZED HEALTH CARE EDUCATION/TRAINING PROGRAM
Payer: MEDICARE

## 2023-04-05 VITALS
OXYGEN SATURATION: 97 % | WEIGHT: 199.96 LBS | RESPIRATION RATE: 18 BRPM | HEART RATE: 76 BPM | HEIGHT: 68 IN | SYSTOLIC BLOOD PRESSURE: 159 MMHG | DIASTOLIC BLOOD PRESSURE: 90 MMHG

## 2023-04-05 DIAGNOSIS — F10.20 ALCOHOL DEPENDENCE, UNCOMPLICATED: ICD-10-CM
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
## 2023-04-05 DIAGNOSIS — E78.5 HYPERLIPIDEMIA, UNSPECIFIED: ICD-10-CM

## 2023-04-05 DIAGNOSIS — I10 ESSENTIAL (PRIMARY) HYPERTENSION: ICD-10-CM

## 2023-04-05 DIAGNOSIS — E11.8 TYPE 2 DIABETES MELLITUS WITH UNSPECIFIED COMPLICATIONS: ICD-10-CM

## 2023-04-05 DIAGNOSIS — N30.00 ACUTE CYSTITIS WITHOUT HEMATURIA: ICD-10-CM

## 2023-04-05 DIAGNOSIS — I48.91 UNSPECIFIED ATRIAL FIBRILLATION: ICD-10-CM

## 2023-04-05 LAB
ALBUMIN SERPL ELPH-MCNC: 3.4 G/DL — SIGNIFICANT CHANGE UP (ref 3.3–5)
ALP SERPL-CCNC: 118 U/L — SIGNIFICANT CHANGE UP (ref 40–120)
ALT FLD-CCNC: 28 U/L — SIGNIFICANT CHANGE UP (ref 12–78)
ANION GAP SERPL CALC-SCNC: 3 MMOL/L — LOW (ref 5–17)
APPEARANCE UR: ABNORMAL
APTT BLD: 31.6 SEC — SIGNIFICANT CHANGE UP (ref 27.5–35.5)
AST SERPL-CCNC: 13 U/L — LOW (ref 15–37)
BACTERIA # UR AUTO: ABNORMAL
BASOPHILS # BLD AUTO: 0.03 K/UL — SIGNIFICANT CHANGE UP (ref 0–0.2)
BASOPHILS NFR BLD AUTO: 0.4 % — SIGNIFICANT CHANGE UP (ref 0–2)
BILIRUB SERPL-MCNC: 0.6 MG/DL — SIGNIFICANT CHANGE UP (ref 0.2–1.2)
BILIRUB UR-MCNC: NEGATIVE — SIGNIFICANT CHANGE UP
BUN SERPL-MCNC: 10 MG/DL — SIGNIFICANT CHANGE UP (ref 7–23)
CALCIUM SERPL-MCNC: 9.8 MG/DL — SIGNIFICANT CHANGE UP (ref 8.5–10.1)
CHLORIDE SERPL-SCNC: 109 MMOL/L — HIGH (ref 96–108)
CO2 SERPL-SCNC: 27 MMOL/L — SIGNIFICANT CHANGE UP (ref 22–31)
COLOR SPEC: YELLOW — SIGNIFICANT CHANGE UP
COMMENT - URINE: SIGNIFICANT CHANGE UP
CREAT SERPL-MCNC: 1.06 MG/DL — SIGNIFICANT CHANGE UP (ref 0.5–1.3)
DIFF PNL FLD: ABNORMAL
EGFR: 70 ML/MIN/1.73M2 — SIGNIFICANT CHANGE UP
EOSINOPHIL # BLD AUTO: 0.08 K/UL — SIGNIFICANT CHANGE UP (ref 0–0.5)
EOSINOPHIL NFR BLD AUTO: 1.1 % — SIGNIFICANT CHANGE UP (ref 0–6)
EPI CELLS # UR: SIGNIFICANT CHANGE UP
ETHANOL SERPL-MCNC: <10 MG/DL — SIGNIFICANT CHANGE UP (ref 0–10)
FLUAV AG NPH QL: SIGNIFICANT CHANGE UP
FLUBV AG NPH QL: SIGNIFICANT CHANGE UP
GLUCOSE BLDC GLUCOMTR-MCNC: 101 MG/DL — HIGH (ref 70–99)
GLUCOSE BLDC GLUCOMTR-MCNC: 127 MG/DL — HIGH (ref 70–99)
GLUCOSE SERPL-MCNC: 144 MG/DL — HIGH (ref 70–99)
GLUCOSE UR QL: NEGATIVE MG/DL — SIGNIFICANT CHANGE UP
HCT VFR BLD CALC: 45.3 % — SIGNIFICANT CHANGE UP (ref 39–50)
HGB BLD-MCNC: 15.1 G/DL — SIGNIFICANT CHANGE UP (ref 13–17)
IMM GRANULOCYTES NFR BLD AUTO: 0.3 % — SIGNIFICANT CHANGE UP (ref 0–0.9)
INR BLD: 1.05 RATIO — SIGNIFICANT CHANGE UP (ref 0.88–1.16)
KETONES UR-MCNC: NEGATIVE — SIGNIFICANT CHANGE UP
LACTATE SERPL-SCNC: 2 MMOL/L — SIGNIFICANT CHANGE UP (ref 0.7–2)
LEUKOCYTE ESTERASE UR-ACNC: ABNORMAL
LYMPHOCYTES # BLD AUTO: 2.08 K/UL — SIGNIFICANT CHANGE UP (ref 1–3.3)
LYMPHOCYTES # BLD AUTO: 28.1 % — SIGNIFICANT CHANGE UP (ref 13–44)
MAGNESIUM SERPL-MCNC: 2.1 MG/DL — SIGNIFICANT CHANGE UP (ref 1.6–2.6)
MCHC RBC-ENTMCNC: 33.3 G/DL — SIGNIFICANT CHANGE UP (ref 32–36)
MCHC RBC-ENTMCNC: 34.2 PG — HIGH (ref 27–34)
MCV RBC AUTO: 102.7 FL — HIGH (ref 80–100)
MONOCYTES # BLD AUTO: 0.57 K/UL — SIGNIFICANT CHANGE UP (ref 0–0.9)
MONOCYTES NFR BLD AUTO: 7.7 % — SIGNIFICANT CHANGE UP (ref 2–14)
NEUTROPHILS # BLD AUTO: 4.63 K/UL — SIGNIFICANT CHANGE UP (ref 1.8–7.4)
NEUTROPHILS NFR BLD AUTO: 62.4 % — SIGNIFICANT CHANGE UP (ref 43–77)
NITRITE UR-MCNC: POSITIVE
NRBC # BLD: 0 /100 WBCS — SIGNIFICANT CHANGE UP (ref 0–0)
PH UR: 6 — SIGNIFICANT CHANGE UP (ref 5–8)
PHOSPHATE SERPL-MCNC: 3.1 MG/DL — SIGNIFICANT CHANGE UP (ref 2.5–4.5)
PLATELET # BLD AUTO: 260 K/UL — SIGNIFICANT CHANGE UP (ref 150–400)
POTASSIUM SERPL-MCNC: 4 MMOL/L — SIGNIFICANT CHANGE UP (ref 3.5–5.3)
POTASSIUM SERPL-SCNC: 4 MMOL/L — SIGNIFICANT CHANGE UP (ref 3.5–5.3)
PROT SERPL-MCNC: 8.4 GM/DL — HIGH (ref 6–8.3)
PROT UR-MCNC: 30 MG/DL
PROTHROM AB SERPL-ACNC: 12.6 SEC — SIGNIFICANT CHANGE UP (ref 10.5–13.4)
RBC # BLD: 4.41 M/UL — SIGNIFICANT CHANGE UP (ref 4.2–5.8)
RBC # FLD: 13.8 % — SIGNIFICANT CHANGE UP (ref 10.3–14.5)
RBC CASTS # UR COMP ASSIST: ABNORMAL /HPF (ref 0–4)
SARS-COV-2 RNA SPEC QL NAA+PROBE: SIGNIFICANT CHANGE UP
SODIUM SERPL-SCNC: 139 MMOL/L — SIGNIFICANT CHANGE UP (ref 135–145)
SP GR SPEC: 1.01 — SIGNIFICANT CHANGE UP (ref 1.01–1.02)
UROBILINOGEN FLD QL: NEGATIVE MG/DL — SIGNIFICANT CHANGE UP
WBC # BLD: 7.41 K/UL — SIGNIFICANT CHANGE UP (ref 3.8–10.5)
WBC # FLD AUTO: 7.41 K/UL — SIGNIFICANT CHANGE UP (ref 3.8–10.5)
WBC UR QL: ABNORMAL

## 2023-04-05 PROCEDURE — 93010 ELECTROCARDIOGRAM REPORT: CPT

## 2023-04-05 PROCEDURE — 71045 X-RAY EXAM CHEST 1 VIEW: CPT | Mod: 26

## 2023-04-05 PROCEDURE — 70450 CT HEAD/BRAIN W/O DYE: CPT | Mod: 26,MA

## 2023-04-05 PROCEDURE — 72125 CT NECK SPINE W/O DYE: CPT | Mod: 26,MA

## 2023-04-05 PROCEDURE — 99285 EMERGENCY DEPT VISIT HI MDM: CPT

## 2023-04-05 PROCEDURE — 99222 1ST HOSP IP/OBS MODERATE 55: CPT

## 2023-04-05 RX ORDER — CEFTRIAXONE 500 MG/1
1000 INJECTION, POWDER, FOR SOLUTION INTRAMUSCULAR; INTRAVENOUS EVERY 24 HOURS
Refills: 0 | Status: COMPLETED | OUTPATIENT
Start: 2023-04-06 | End: 2023-04-08

## 2023-04-05 RX ORDER — SODIUM CHLORIDE 9 MG/ML
1000 INJECTION, SOLUTION INTRAVENOUS ONCE
Refills: 0 | Status: COMPLETED | OUTPATIENT
Start: 2023-04-05 | End: 2023-04-05

## 2023-04-05 RX ORDER — DEXTROSE 50 % IN WATER 50 %
15 SYRINGE (ML) INTRAVENOUS ONCE
Refills: 0 | Status: DISCONTINUED | OUTPATIENT
Start: 2023-04-05 | End: 2023-04-11

## 2023-04-05 RX ORDER — CEFTRIAXONE 500 MG/1
1000 INJECTION, POWDER, FOR SOLUTION INTRAMUSCULAR; INTRAVENOUS ONCE
Refills: 0 | Status: COMPLETED | OUTPATIENT
Start: 2023-04-05 | End: 2023-04-05

## 2023-04-05 RX ORDER — SODIUM CHLORIDE 9 MG/ML
1000 INJECTION, SOLUTION INTRAVENOUS
Refills: 0 | Status: DISCONTINUED | OUTPATIENT
Start: 2023-04-05 | End: 2023-04-11

## 2023-04-05 RX ORDER — INDOMETHACIN 50 MG
1 CAPSULE ORAL
Qty: 0 | Refills: 0 | DISCHARGE

## 2023-04-05 RX ORDER — OMEPRAZOLE 10 MG/1
1 CAPSULE, DELAYED RELEASE ORAL
Qty: 0 | Refills: 0 | DISCHARGE

## 2023-04-05 RX ORDER — DEXTROSE 50 % IN WATER 50 %
12.5 SYRINGE (ML) INTRAVENOUS ONCE
Refills: 0 | Status: DISCONTINUED | OUTPATIENT
Start: 2023-04-05 | End: 2023-04-11

## 2023-04-05 RX ORDER — THIAMINE MONONITRATE (VIT B1) 100 MG
100 TABLET ORAL DAILY
Refills: 0 | Status: DISCONTINUED | OUTPATIENT
Start: 2023-04-06 | End: 2023-04-11

## 2023-04-05 RX ORDER — INSULIN LISPRO 100/ML
VIAL (ML) SUBCUTANEOUS AT BEDTIME
Refills: 0 | Status: DISCONTINUED | OUTPATIENT
Start: 2023-04-05 | End: 2023-04-11

## 2023-04-05 RX ORDER — METFORMIN HYDROCHLORIDE 850 MG/1
1 TABLET ORAL
Refills: 0 | DISCHARGE

## 2023-04-05 RX ORDER — DEXTROSE 50 % IN WATER 50 %
25 SYRINGE (ML) INTRAVENOUS ONCE
Refills: 0 | Status: DISCONTINUED | OUTPATIENT
Start: 2023-04-05 | End: 2023-04-11

## 2023-04-05 RX ORDER — CHOLECALCIFEROL (VITAMIN D3) 125 MCG
1 CAPSULE ORAL
Refills: 0 | DISCHARGE

## 2023-04-05 RX ORDER — LOSARTAN POTASSIUM 100 MG/1
100 TABLET, FILM COATED ORAL DAILY
Refills: 0 | Status: DISCONTINUED | OUTPATIENT
Start: 2023-04-05 | End: 2023-04-11

## 2023-04-05 RX ORDER — PHENOBARBITAL 60 MG
260 TABLET ORAL ONCE
Refills: 0 | Status: DISCONTINUED | OUTPATIENT
Start: 2023-04-05 | End: 2023-04-05

## 2023-04-05 RX ORDER — GLUCAGON INJECTION, SOLUTION 0.5 MG/.1ML
1 INJECTION, SOLUTION SUBCUTANEOUS ONCE
Refills: 0 | Status: DISCONTINUED | OUTPATIENT
Start: 2023-04-05 | End: 2023-04-11

## 2023-04-05 RX ORDER — APIXABAN 2.5 MG/1
5 TABLET, FILM COATED ORAL
Refills: 0 | Status: DISCONTINUED | OUTPATIENT
Start: 2023-04-05 | End: 2023-04-11

## 2023-04-05 RX ORDER — SENNA PLUS 8.6 MG/1
2 TABLET ORAL
Refills: 0 | DISCHARGE

## 2023-04-05 RX ORDER — LANOLIN ALCOHOL/MO/W.PET/CERES
3 CREAM (GRAM) TOPICAL AT BEDTIME
Refills: 0 | Status: DISCONTINUED | OUTPATIENT
Start: 2023-04-05 | End: 2023-04-11

## 2023-04-05 RX ORDER — METFORMIN HYDROCHLORIDE 850 MG/1
500 TABLET ORAL
Qty: 0 | Refills: 0 | DISCHARGE

## 2023-04-05 RX ORDER — THIAMINE MONONITRATE (VIT B1) 100 MG
500 TABLET ORAL ONCE
Refills: 0 | Status: COMPLETED | OUTPATIENT
Start: 2023-04-05 | End: 2023-04-05

## 2023-04-05 RX ORDER — ATORVASTATIN CALCIUM 80 MG/1
10 TABLET, FILM COATED ORAL AT BEDTIME
Refills: 0 | Status: DISCONTINUED | OUTPATIENT
Start: 2023-04-05 | End: 2023-04-11

## 2023-04-05 RX ORDER — ACETAMINOPHEN 500 MG
650 TABLET ORAL EVERY 6 HOURS
Refills: 0 | Status: DISCONTINUED | OUTPATIENT
Start: 2023-04-05 | End: 2023-04-11

## 2023-04-05 RX ORDER — INSULIN LISPRO 100/ML
VIAL (ML) SUBCUTANEOUS
Refills: 0 | Status: DISCONTINUED | OUTPATIENT
Start: 2023-04-05 | End: 2023-04-11

## 2023-04-05 RX ADMIN — SODIUM CHLORIDE 1000 MILLILITER(S): 9 INJECTION, SOLUTION INTRAVENOUS at 11:56

## 2023-04-05 RX ADMIN — Medication 105 MILLIGRAM(S): at 12:39

## 2023-04-05 RX ADMIN — Medication 3 MILLIGRAM(S): at 21:54

## 2023-04-05 RX ADMIN — Medication 1 MILLIGRAM(S): at 14:34

## 2023-04-05 RX ADMIN — Medication 260 MILLIGRAM(S): at 12:30

## 2023-04-05 RX ADMIN — Medication 1 TABLET(S): at 14:36

## 2023-04-05 RX ADMIN — LOSARTAN POTASSIUM 100 MILLIGRAM(S): 100 TABLET, FILM COATED ORAL at 14:37

## 2023-04-05 RX ADMIN — CEFTRIAXONE 100 MILLIGRAM(S): 500 INJECTION, POWDER, FOR SOLUTION INTRAMUSCULAR; INTRAVENOUS at 11:56

## 2023-04-05 RX ADMIN — APIXABAN 5 MILLIGRAM(S): 2.5 TABLET, FILM COATED ORAL at 19:30

## 2023-04-05 RX ADMIN — ATORVASTATIN CALCIUM 10 MILLIGRAM(S): 80 TABLET, FILM COATED ORAL at 21:53

## 2023-04-05 RX ADMIN — SODIUM CHLORIDE 1000 MILLILITER(S): 9 INJECTION, SOLUTION INTRAVENOUS at 12:57

## 2023-04-05 NOTE — H&P ADULT - PROBLEM SELECTOR PLAN 2
UA appear dirty  Patient has chills and rigors during exam  Continue ceftriaxone x 3 days, follow urine culture. If bacteremic, will need to extend course of antibiotics

## 2023-04-05 NOTE — ED ADULT NURSE NOTE - OBJECTIVE STATEMENT
patient is A&Ox3. Breathing unlabored on RA. PMH HTN, HLD, DM, ETOH abuse, afib on Eliquis. patient non-complaint with meds. patient BIBEMS from home. as per triage, patient found on the floor today. unknown hitting head or LOC. patient noted with tremors. patient complaiing of dizziess x 2 weeks, reports being d/c from another hospital. denies any fever, chills, sob, cp, n/v/d, fever, chills. patient reports drinking, unknown last drink. patient complaining of discomfort when urinating. denies any other symptoms. patient poor historian.

## 2023-04-05 NOTE — ED ADULT NURSE NOTE - NSICDXPASTMEDICALHX_GEN_ALL_CORE_FT
PAST MEDICAL HISTORY:  Afib     Diabetes     ETOH abuse     High blood pressure     High cholesterol

## 2023-04-05 NOTE — ED ADULT NURSE NOTE - ED STAT RN HANDOFF DETAILS
Report given to Shabnam Ivey RN. Patient mental status at baseline. patient in no acute or respiratory distress. IV site clean dry and intact. Endorsed all concerns to RN.

## 2023-04-05 NOTE — PATIENT PROFILE ADULT - FALL HARM RISK - HARM RISK INTERVENTIONS
Assistance with ambulation/Assistance OOB with selected safe patient handling equipment/Communicate Risk of Fall with Harm to all staff/Monitor for mental status changes/Monitor gait and stability/Reinforce activity limits and safety measures with patient and family/Tailored Fall Risk Interventions/Toileting schedule using arm’s reach rule for commode and bathroom/Use of alarms - bed, chair and/or voice tab/Visual Cue: Yellow wristband and red socks/Bed in lowest position, wheels locked, appropriate side rails in place/Call bell, personal items and telephone in reach/Instruct patient to call for assistance before getting out of bed or chair/Non-slip footwear when patient is out of bed/[place] to call system/Physically safe environment - no spills, clutter or unnecessary equipment/Purposeful Proactive Rounding/Room/bathroom lighting operational, light cord in reach

## 2023-04-05 NOTE — ED PROVIDER NOTE - CLINICAL SUMMARY MEDICAL DECISION MAKING FREE TEXT BOX
pt pw tremors and fall. suspect etoh withdrawal. fluids, thiamine, phenobarb. reassess.   I read ekg as afib rate 68, lad, no st elevation or depression, qtc 346, narrow qrs, normal axis. pt pw tremors and fall. suspect etoh withdrawal. fluids, thiamine, phenobarb. reassess.   I read ekg as afib rate 68, lad, no st elevation or depression, qtc 346, narrow qrs, normal axis.  ua shows uti. start ceftriaxone. admit for weakness.

## 2023-04-05 NOTE — ED ADULT NURSE NOTE - NSFALLRSKHRMRISKTYPE_ED_ALL_ED
Does the patient have any signs or symptoms of infection including fevers, chills, or drainage from the injection site?   If not, will follow up in the office coagulation(Bleeding disorder R/T clinical cond/anti-coags)

## 2023-04-05 NOTE — H&P ADULT - NSHPADDITIONALINFOADULT_GEN_ALL_CORE
Still have issue with EMR. unable to complete admission med rec.   per daughter over the phone,  patient is on  metformin 500mg 1 daily in AM  atorvastatin 10mg hs  folic acid  losartan 100mg daily  meclizine 25mg prn  tramadol 50mg  apixaban 5mg bid  daughter will bring all documents from home

## 2023-04-05 NOTE — ED PROVIDER NOTE - OBJECTIVE STATEMENT
82m hx afib on eliquis, dm, htn, hld, etoh use disorder pw dizziness and fall. pt notes he has been dizzy for 2 weeks since being discharged from hospital (rehab?). denies cp, sob, nausea, vomiting, fever.  notes that he last drank 5 days ago

## 2023-04-05 NOTE — H&P ADULT - PROBLEM SELECTOR PLAN 3
patient admit to vodka use, but unable to tell exactly how much he drinks daily  Mildly tremulous in ED, received benzo  Thiamine, folic acid, multivitamin  CIWA protocol with symptoms trigger ativan  Monitor for DT

## 2023-04-05 NOTE — H&P ADULT - PROBLEM SELECTOR PLAN 1
generalized weakness due to UTI vs mild alcohol withdrawal  CT head with no acute pathology, CT C spine with no fracture  No focal bony tenderness  PT evaluation  Ceftriaxone for UTI  CIWA protocol with symptoms trigger ativan  thiamine  S/W consult- daughter unable to take care of patient. Patient is refusing medications at home as well

## 2023-04-05 NOTE — ED ADULT TRIAGE NOTE - CHIEF COMPLAINT QUOTE
DC from [facility] 3/15 found on floor in room at home today, confused and tremors H/O  AFIB DM  by EMS, Non compliant with meds, foul smelling urine, may have dementia as per EMS, supppose to take Eliquis but doesn't want to take it, unknown LOC

## 2023-04-05 NOTE — ED ADULT NURSE NOTE - CHIEF COMPLAINT QUOTE
DC from [facility] 3/15 found on floor in room at home today, confused and tremors H/O  AFIB DM  by EMS, Non compliant with meds, foul smelling urine, may have dementia as per EMS, supppose to take Eliquis but doesn't want to take it, unknown LOC

## 2023-04-05 NOTE — ED PROVIDER NOTE - PHYSICAL EXAMINATION
Gen: Alert, NAD  Head: NC, AT   Eyes: PERRL, EOMI, normal lids/conjunctiva  ENT: normal hearing, patent oropharynx without erythema/exudate, uvula midline  Neck: supple, no tenderness, Trachea midline  Pulm: Bilateral BS, normal resp effort, no wheeze/stridor/retractions  CV: RRR, no M/R/G, 2+ radial and dp pulses bl, no edema  Abd: soft, NT/ND, +BS, no hepatosplenomegaly  Mskel: extremities x4 with normal ROM and no joint effusions. no ctl spine ttp.   Skin: no rash, no bruising   Neuro: AAOx3, no sensory/motor deficits, tremulous. CN 2-12 intact

## 2023-04-05 NOTE — H&P ADULT - NSHPPHYSICALEXAM_GEN_ALL_CORE
CONSTITUTIONAL: alert and cooperative, no acute distress.   EYES: PERRL,  no scleral icterus  ENT: Mucosa moist, tongue normal.  NECK: Neck supple, trachea midline, non-tender  CARDIAC: Normal S1 and S2. Regular rate and rhythms. No murmurs.  No Pedal edema. Peripheral pulses intact  LUNGS: Equal air entry both lungs. No rales, rhonchi, wheezing. Normal respiratory effort.   ABDOMEN: Soft, nondistended, nontender. No guarding or rebound tenderness. No hepatomegaly or splenomegaly. Bowel sound normal.   MUSCULOSKELETAL: Normocephalic, atraumatic. No significant deformity or joint abnormality.  NEUROLOGICAL: No gross  sensory deficits. Slightly weak bilateral LE due to OA knee/hip  SKIN: no lesions or eruptions. Normal turgor  PSYCHIATRIC: A&O x 2, slow to response

## 2023-04-05 NOTE — ED ADULT NURSE NOTE - NSIMPLEMENTINTERV_GEN_ALL_ED
Implemented All Fall with Harm Risk Interventions:  [place] to call system. Call bell, personal items and telephone within reach. Instruct patient to call for assistance. Room bathroom lighting operational. Non-slip footwear when patient is off stretcher. Physically safe environment: no spills, clutter or unnecessary equipment. Stretcher in lowest position, wheels locked, appropriate side rails in place. Provide visual cue, wrist band, yellow gown, etc. Monitor gait and stability. Monitor for mental status changes and reorient to person, place, and time. Review medications for side effects contributing to fall risk. Reinforce activity limits and safety measures with patient and family. Provide visual clues: red socks.

## 2023-04-05 NOTE — ED PROVIDER NOTE - CARE PLAN
Principal Discharge DX:	Alcohol dependence with withdrawal   1 Principal Discharge DX:	Alcohol dependence with withdrawal  Secondary Diagnosis:	Acute UTI

## 2023-04-05 NOTE — H&P ADULT - HISTORY OF PRESENT ILLNESS
Talked to daughter Shiva Wilson ( [phone]) over the phone  82 years old male HTN, HLD, Afib on apixaban, DM, alcohol use, dementia present to ED with fall. Patient was found on floor on his room, confused and noted tremors. Patient is a very poor historian. He reported chills for 1 day. He admit to alcohol use but cannot say exactly how much. No fever, urinary symptoms, cough, nausea, vomiting, diarrhea or abdominal pain. Per daughter, patient was recently discharged from NH on 3/16/23  Hypertensive, afebrile, sat well at RA. Appear to have chills. No leukocytosis, plt 260, K 4, Cr 1.06, glucose 144, lactate 2. UA appear dirty. CT head with no acute pathology, CT C spine with no fracture. CXR image reviewed, no focal consolidation.     SH: drink vodka

## 2023-04-05 NOTE — SBIRT NOTE ADULT - NSSBIRTUNABLESCR_GEN_A_CORE
Other (specify) Pt not answering questions at this time- HC will attempt    2nd attempt made- pt given ativan, unable to assess

## 2023-04-05 NOTE — H&P ADULT - ASSESSMENT
Talked to daughter Shiva Wilson ( [phone]) over the phone  82 years old male HTN, HLD, Afib on apixaban, DM, alcohol use, dementia present to ED with fall. Patient was found on floor on his room, confused and noted tremors. Patient is a very poor historian. He reported chills for 1 day. He admit to alcohol use but cannot say exactly how much. No fever, urinary symptoms, cough, nausea, vomiting, diarrhea or abdominal pain. Per daughter, patient was recently discharged from NH on 3/16/23  Hypertensive, afebrile, sat well at RA. Appear to have chills. No leukocytosis, plt 260, K 4, Cr 1.06, glucose 144, lactate 2. UA appear dirty. CT head with no acute pathology, CT C spine with no fracture. CXR image reviewed, no focal consolidation.       Admitted with fall, UTI

## 2023-04-06 LAB
A1C WITH ESTIMATED AVERAGE GLUCOSE RESULT: 6.9 % — HIGH (ref 4–5.6)
ALBUMIN SERPL ELPH-MCNC: 2.9 G/DL — LOW (ref 3.3–5)
ALP SERPL-CCNC: 98 U/L — SIGNIFICANT CHANGE UP (ref 40–120)
ALT FLD-CCNC: 22 U/L — SIGNIFICANT CHANGE UP (ref 12–78)
ANION GAP SERPL CALC-SCNC: 5 MMOL/L — SIGNIFICANT CHANGE UP (ref 5–17)
AST SERPL-CCNC: 9 U/L — LOW (ref 15–37)
BILIRUB SERPL-MCNC: 0.7 MG/DL — SIGNIFICANT CHANGE UP (ref 0.2–1.2)
BUN SERPL-MCNC: 9 MG/DL — SIGNIFICANT CHANGE UP (ref 7–23)
CALCIUM SERPL-MCNC: 9 MG/DL — SIGNIFICANT CHANGE UP (ref 8.5–10.1)
CHLORIDE SERPL-SCNC: 105 MMOL/L — SIGNIFICANT CHANGE UP (ref 96–108)
CHOLEST SERPL-MCNC: 146 MG/DL — SIGNIFICANT CHANGE UP
CO2 SERPL-SCNC: 28 MMOL/L — SIGNIFICANT CHANGE UP (ref 22–31)
CREAT SERPL-MCNC: 1.01 MG/DL — SIGNIFICANT CHANGE UP (ref 0.5–1.3)
EGFR: 74 ML/MIN/1.73M2 — SIGNIFICANT CHANGE UP
ESTIMATED AVERAGE GLUCOSE: 151 MG/DL — HIGH (ref 68–114)
GLUCOSE BLDC GLUCOMTR-MCNC: 111 MG/DL — HIGH (ref 70–99)
GLUCOSE BLDC GLUCOMTR-MCNC: 116 MG/DL — HIGH (ref 70–99)
GLUCOSE BLDC GLUCOMTR-MCNC: 175 MG/DL — HIGH (ref 70–99)
GLUCOSE BLDC GLUCOMTR-MCNC: 186 MG/DL — HIGH (ref 70–99)
GLUCOSE SERPL-MCNC: 117 MG/DL — HIGH (ref 70–99)
HCT VFR BLD CALC: 39.6 % — SIGNIFICANT CHANGE UP (ref 39–50)
HDLC SERPL-MCNC: 48 MG/DL — SIGNIFICANT CHANGE UP
HGB BLD-MCNC: 13.1 G/DL — SIGNIFICANT CHANGE UP (ref 13–17)
LIPID PNL WITH DIRECT LDL SERPL: 81 MG/DL — SIGNIFICANT CHANGE UP
MCHC RBC-ENTMCNC: 33.1 G/DL — SIGNIFICANT CHANGE UP (ref 32–36)
MCHC RBC-ENTMCNC: 34.2 PG — HIGH (ref 27–34)
MCV RBC AUTO: 103.4 FL — HIGH (ref 80–100)
NON HDL CHOLESTEROL: 98 MG/DL — SIGNIFICANT CHANGE UP
NRBC # BLD: 0 /100 WBCS — SIGNIFICANT CHANGE UP (ref 0–0)
PHOSPHATE SERPL-MCNC: 3.6 MG/DL — SIGNIFICANT CHANGE UP (ref 2.5–4.5)
PLATELET # BLD AUTO: 238 K/UL — SIGNIFICANT CHANGE UP (ref 150–400)
POTASSIUM SERPL-MCNC: 4 MMOL/L — SIGNIFICANT CHANGE UP (ref 3.5–5.3)
POTASSIUM SERPL-SCNC: 4 MMOL/L — SIGNIFICANT CHANGE UP (ref 3.5–5.3)
PROT SERPL-MCNC: 7 GM/DL — SIGNIFICANT CHANGE UP (ref 6–8.3)
RBC # BLD: 3.83 M/UL — LOW (ref 4.2–5.8)
RBC # FLD: 13.8 % — SIGNIFICANT CHANGE UP (ref 10.3–14.5)
SODIUM SERPL-SCNC: 138 MMOL/L — SIGNIFICANT CHANGE UP (ref 135–145)
TRIGL SERPL-MCNC: 86 MG/DL — SIGNIFICANT CHANGE UP
WBC # BLD: 6 K/UL — SIGNIFICANT CHANGE UP (ref 3.8–10.5)
WBC # FLD AUTO: 6 K/UL — SIGNIFICANT CHANGE UP (ref 3.8–10.5)

## 2023-04-06 PROCEDURE — 99232 SBSQ HOSP IP/OBS MODERATE 35: CPT

## 2023-04-06 RX ADMIN — APIXABAN 5 MILLIGRAM(S): 2.5 TABLET, FILM COATED ORAL at 17:51

## 2023-04-06 RX ADMIN — CEFTRIAXONE 100 MILLIGRAM(S): 500 INJECTION, POWDER, FOR SOLUTION INTRAMUSCULAR; INTRAVENOUS at 11:49

## 2023-04-06 RX ADMIN — LOSARTAN POTASSIUM 100 MILLIGRAM(S): 100 TABLET, FILM COATED ORAL at 05:12

## 2023-04-06 RX ADMIN — ATORVASTATIN CALCIUM 10 MILLIGRAM(S): 80 TABLET, FILM COATED ORAL at 21:19

## 2023-04-06 RX ADMIN — Medication 1 TABLET(S): at 11:49

## 2023-04-06 RX ADMIN — Medication 1 MILLIGRAM(S): at 20:33

## 2023-04-06 RX ADMIN — Medication 1: at 11:50

## 2023-04-06 RX ADMIN — APIXABAN 5 MILLIGRAM(S): 2.5 TABLET, FILM COATED ORAL at 05:12

## 2023-04-06 RX ADMIN — Medication 100 MILLIGRAM(S): at 11:49

## 2023-04-06 NOTE — PROGRESS NOTE ADULT - ASSESSMENT
Talked to daughter Shiva Wilson ( [phone]) over the phone  82 years old male HTN, HLD, Afib on apixaban, DM, alcohol use, dementia present to ED with fall. Patient was found on floor on his room, confused and noted tremors. Patient is a very poor historian. He reported chills for 1 day. He admit to alcohol use but cannot say exactly how much. No fever, urinary symptoms, cough, nausea, vomiting, diarrhea or abdominal pain. Per daughter, patient was recently discharged from NH on 3/16/23  Hypertensive, afebrile, sat well at RA. Appear to have chills. No leukocytosis, plt 260, K 4, Cr 1.06, glucose 144, lactate 2. UA appear dirty. CT head with no acute pathology, CT C spine with no fracture. CXR image reviewed, no focal consolidation.       Admitted with fall, UTI Talked to daughter Shiva Wilson ( [phone]) over the phone  82 years old male HTN, HLD, Afib on apixaban, DM, alcohol use, dementia present to ED with fall. Patient was found on floor on his room, confused and noted tremors. Patient is a very poor historian. He reported chills for 1 day. He admit to alcohol use but cannot say exactly how much. No fever, urinary symptoms, cough, nausea, vomiting, diarrhea or abdominal pain. Per daughter, patient was recently discharged from NH on 3/16/23  Hypertensive, afebrile, sat well at RA. Appear to have chills. No leukocytosis, plt 260, K 4, Cr 1.06, glucose 144, lactate 2. UA appear dirty. CT head with no acute pathology, CT C spine with no fracture. CXR image reviewed, no focal consolidation.       Admitted with fall, UTI                  Atempted to the call the daughter multiple times to provided an update regarding dads condition- sent to voicemail multiple times.

## 2023-04-06 NOTE — PHYSICAL THERAPY INITIAL EVALUATION ADULT - ADDITIONAL COMMENTS
Pt states prior this hospitalization he was completely independent in ADLs and ambulates without using any walking device.

## 2023-04-06 NOTE — PHYSICAL THERAPY INITIAL EVALUATION ADULT - LIVES WITH, PROFILE
Pt states he lives in pvt house with daughter and niece, at the entrance has 4 steps with rail; stays on the main floor.

## 2023-04-06 NOTE — PHYSICAL THERAPY INITIAL EVALUATION ADULT - LEVEL OF INDEPENDENCE: BED TO CHAIR, REHAB EVAL
NEXPLANON AFTERCARE INSTRUCTIONS     Keep dressing on and dry for 24 hours, then remove wrap.  Replace bandaid daily for 5 days. Keep your user card in a safe place where you'll remember it.      You may have some pain at the site of the Nexplanon insertion. You can help relieve the discomfort with Tylenol (acetaminophen), Aspirin or Advil (ibuprofen). If your discomfort worsens or you notice redness spreading on the skin around the insertion site, please call the clinic.       Irregular bleeding is common with Nexplanon, especially in the first 6-12 months of use. After one year, approximately 20% of women who use Nexplanon will stop having periods completely. Some women have longer, heavier periods. Some women will have increased spotting between periods. You may find that your periods may be hard to predict.       The Nexplanon does not protect against sexually transmitted infections including the AIDS virus (HIV), warts (HPV), gonorrhea, Chlamydia, and herpes. Condoms should be used to decrease the risk sexually transmitted infections. If you think that you have been exposed to a sexually transmitted infection, please call the clinic.       If you had Nexplanon placed for birth control, it is effective immediately if it was inserted within five days after the start of your period. If you have Nexplanon inserted at any other time during your menstrual cycle, use another method of birth control, like condoms for at least 7 days.       The Nexplanon should be removed and/or replaced by a health care provider after three years.   Warning Signs   Call the clinic if any of the following occurs:     You have bleeding, pus, or increasing redness, or pain at insertion site.     You have fever or chills     The implant comes out or you have concerns about its location.     You have a positive pregnancy test or suspect you might be pregnant.    moderate assist (50% patients effort)

## 2023-04-06 NOTE — PHYSICAL THERAPY INITIAL EVALUATION ADULT - STRENGTHENING, PT EVAL
Improve strength in the UE and LE to 5/5, improve general endurance to good, and be able to perform functional tasks-bed mobility, sitting, standing, transfers and ambulate in a safe manner with or without  assistive device and prevent falls.

## 2023-04-06 NOTE — PHYSICAL THERAPY INITIAL EVALUATION ADULT - REHAB POTENTIAL, PT EVAL
0730 Bedside and Verbal shift change report given to Wagner Valentin and Annette Morin (oncoming nurse) by Ambar Arita (offgoing nurse). Report included the following information SBAR, Kardex, ED Summary, Procedure Summary, Intake/Output, MAR, Accordion and Recent Results. 1930 Bedside and Verbal shift change report given to Dorys Rainey (oncoming nurse) by Sweetie Villa (offgoing nurse). Report included the following information SBAR, Kardex, ED Summary, Procedure Summary, Intake/Output, MAR, Accordion and Recent Results. good, to achieve stated therapy goals

## 2023-04-07 LAB
-  AMIKACIN: SIGNIFICANT CHANGE UP
-  AMOXICILLIN/CLAVULANIC ACID: SIGNIFICANT CHANGE UP
-  AMPICILLIN/SULBACTAM: SIGNIFICANT CHANGE UP
-  AMPICILLIN: SIGNIFICANT CHANGE UP
-  AZTREONAM: SIGNIFICANT CHANGE UP
-  CEFAZOLIN: SIGNIFICANT CHANGE UP
-  CEFEPIME: SIGNIFICANT CHANGE UP
-  CEFOXITIN: SIGNIFICANT CHANGE UP
-  CEFTRIAXONE: SIGNIFICANT CHANGE UP
-  CEFUROXIME: SIGNIFICANT CHANGE UP
-  CIPROFLOXACIN: SIGNIFICANT CHANGE UP
-  ERTAPENEM: SIGNIFICANT CHANGE UP
-  GENTAMICIN: SIGNIFICANT CHANGE UP
-  IMIPENEM: SIGNIFICANT CHANGE UP
-  LEVOFLOXACIN: SIGNIFICANT CHANGE UP
-  MEROPENEM: SIGNIFICANT CHANGE UP
-  NITROFURANTOIN: SIGNIFICANT CHANGE UP
-  PIPERACILLIN/TAZOBACTAM: SIGNIFICANT CHANGE UP
-  TOBRAMYCIN: SIGNIFICANT CHANGE UP
-  TRIMETHOPRIM/SULFAMETHOXAZOLE: SIGNIFICANT CHANGE UP
ANION GAP SERPL CALC-SCNC: 6 MMOL/L — SIGNIFICANT CHANGE UP (ref 5–17)
BASOPHILS # BLD AUTO: 0.03 K/UL — SIGNIFICANT CHANGE UP (ref 0–0.2)
BASOPHILS NFR BLD AUTO: 0.5 % — SIGNIFICANT CHANGE UP (ref 0–2)
BUN SERPL-MCNC: 12 MG/DL — SIGNIFICANT CHANGE UP (ref 7–23)
CALCIUM SERPL-MCNC: 9.2 MG/DL — SIGNIFICANT CHANGE UP (ref 8.5–10.1)
CHLORIDE SERPL-SCNC: 106 MMOL/L — SIGNIFICANT CHANGE UP (ref 96–108)
CO2 SERPL-SCNC: 26 MMOL/L — SIGNIFICANT CHANGE UP (ref 22–31)
CREAT SERPL-MCNC: 1.02 MG/DL — SIGNIFICANT CHANGE UP (ref 0.5–1.3)
CULTURE RESULTS: SIGNIFICANT CHANGE UP
EGFR: 73 ML/MIN/1.73M2 — SIGNIFICANT CHANGE UP
EOSINOPHIL # BLD AUTO: 0.19 K/UL — SIGNIFICANT CHANGE UP (ref 0–0.5)
EOSINOPHIL NFR BLD AUTO: 2.9 % — SIGNIFICANT CHANGE UP (ref 0–6)
GLUCOSE BLDC GLUCOMTR-MCNC: 119 MG/DL — HIGH (ref 70–99)
GLUCOSE BLDC GLUCOMTR-MCNC: 121 MG/DL — HIGH (ref 70–99)
GLUCOSE BLDC GLUCOMTR-MCNC: 182 MG/DL — HIGH (ref 70–99)
GLUCOSE BLDC GLUCOMTR-MCNC: 97 MG/DL — SIGNIFICANT CHANGE UP (ref 70–99)
GLUCOSE SERPL-MCNC: 136 MG/DL — HIGH (ref 70–99)
HCT VFR BLD CALC: 41.1 % — SIGNIFICANT CHANGE UP (ref 39–50)
HGB BLD-MCNC: 13.5 G/DL — SIGNIFICANT CHANGE UP (ref 13–17)
IMM GRANULOCYTES NFR BLD AUTO: 0.3 % — SIGNIFICANT CHANGE UP (ref 0–0.9)
LYMPHOCYTES # BLD AUTO: 2.25 K/UL — SIGNIFICANT CHANGE UP (ref 1–3.3)
LYMPHOCYTES # BLD AUTO: 34.3 % — SIGNIFICANT CHANGE UP (ref 13–44)
MAGNESIUM SERPL-MCNC: 1.9 MG/DL — SIGNIFICANT CHANGE UP (ref 1.6–2.6)
MCHC RBC-ENTMCNC: 32.8 G/DL — SIGNIFICANT CHANGE UP (ref 32–36)
MCHC RBC-ENTMCNC: 33.5 PG — SIGNIFICANT CHANGE UP (ref 27–34)
MCV RBC AUTO: 102 FL — HIGH (ref 80–100)
METHOD TYPE: SIGNIFICANT CHANGE UP
MONOCYTES # BLD AUTO: 0.57 K/UL — SIGNIFICANT CHANGE UP (ref 0–0.9)
MONOCYTES NFR BLD AUTO: 8.7 % — SIGNIFICANT CHANGE UP (ref 2–14)
NEUTROPHILS # BLD AUTO: 3.5 K/UL — SIGNIFICANT CHANGE UP (ref 1.8–7.4)
NEUTROPHILS NFR BLD AUTO: 53.3 % — SIGNIFICANT CHANGE UP (ref 43–77)
NRBC # BLD: 0 /100 WBCS — SIGNIFICANT CHANGE UP (ref 0–0)
ORGANISM # SPEC MICROSCOPIC CNT: SIGNIFICANT CHANGE UP
ORGANISM # SPEC MICROSCOPIC CNT: SIGNIFICANT CHANGE UP
PHOSPHATE SERPL-MCNC: 3.6 MG/DL — SIGNIFICANT CHANGE UP (ref 2.5–4.5)
PLATELET # BLD AUTO: 245 K/UL — SIGNIFICANT CHANGE UP (ref 150–400)
POTASSIUM SERPL-MCNC: 3.7 MMOL/L — SIGNIFICANT CHANGE UP (ref 3.5–5.3)
POTASSIUM SERPL-SCNC: 3.7 MMOL/L — SIGNIFICANT CHANGE UP (ref 3.5–5.3)
RBC # BLD: 4.03 M/UL — LOW (ref 4.2–5.8)
RBC # FLD: 13.3 % — SIGNIFICANT CHANGE UP (ref 10.3–14.5)
SODIUM SERPL-SCNC: 138 MMOL/L — SIGNIFICANT CHANGE UP (ref 135–145)
SPECIMEN SOURCE: SIGNIFICANT CHANGE UP
WBC # BLD: 6.56 K/UL — SIGNIFICANT CHANGE UP (ref 3.8–10.5)
WBC # FLD AUTO: 6.56 K/UL — SIGNIFICANT CHANGE UP (ref 3.8–10.5)

## 2023-04-07 PROCEDURE — 99232 SBSQ HOSP IP/OBS MODERATE 35: CPT

## 2023-04-07 RX ADMIN — Medication 1: at 11:19

## 2023-04-07 RX ADMIN — Medication 1 TABLET(S): at 11:19

## 2023-04-07 RX ADMIN — ATORVASTATIN CALCIUM 10 MILLIGRAM(S): 80 TABLET, FILM COATED ORAL at 21:10

## 2023-04-07 RX ADMIN — CEFTRIAXONE 100 MILLIGRAM(S): 500 INJECTION, POWDER, FOR SOLUTION INTRAMUSCULAR; INTRAVENOUS at 11:20

## 2023-04-07 RX ADMIN — APIXABAN 5 MILLIGRAM(S): 2.5 TABLET, FILM COATED ORAL at 05:05

## 2023-04-07 RX ADMIN — Medication 100 MILLIGRAM(S): at 11:19

## 2023-04-07 RX ADMIN — APIXABAN 5 MILLIGRAM(S): 2.5 TABLET, FILM COATED ORAL at 17:08

## 2023-04-07 RX ADMIN — LOSARTAN POTASSIUM 100 MILLIGRAM(S): 100 TABLET, FILM COATED ORAL at 05:05

## 2023-04-07 NOTE — PROGRESS NOTE ADULT - ASSESSMENT
Talked to daughter Shiva Wilson ( [phone]) over the phone  82 years old male HTN, HLD, Afib on apixaban, DM, alcohol use, dementia present to ED with fall. Patient was found on floor on his room, confused and noted tremors. Patient is a very poor historian. He reported chills for 1 day. He admit to alcohol use but cannot say exactly how much. No fever, urinary symptoms, cough, nausea, vomiting, diarrhea or abdominal pain. Per daughter, patient was recently discharged from NH on 3/16/23  Hypertensive, afebrile, sat well at RA. Appear to have chills. No leukocytosis, plt 260, K 4, Cr 1.06, glucose 144, lactate 2. UA appear dirty. CT head with no acute pathology, CT C spine with no fracture. CXR image reviewed, no focal consolidation.       Admitted with fall, UTI                  Atempted to the call the daughter multiple times to provided an update regarding dads condition- sent to voicemail multiple times.

## 2023-04-08 LAB
ANION GAP SERPL CALC-SCNC: 3 MMOL/L — LOW (ref 5–17)
BASOPHILS # BLD AUTO: 0.04 K/UL — SIGNIFICANT CHANGE UP (ref 0–0.2)
BASOPHILS NFR BLD AUTO: 0.6 % — SIGNIFICANT CHANGE UP (ref 0–2)
BUN SERPL-MCNC: 12 MG/DL — SIGNIFICANT CHANGE UP (ref 7–23)
CALCIUM SERPL-MCNC: 9.2 MG/DL — SIGNIFICANT CHANGE UP (ref 8.5–10.1)
CHLORIDE SERPL-SCNC: 109 MMOL/L — HIGH (ref 96–108)
CO2 SERPL-SCNC: 29 MMOL/L — SIGNIFICANT CHANGE UP (ref 22–31)
CREAT SERPL-MCNC: 1.14 MG/DL — SIGNIFICANT CHANGE UP (ref 0.5–1.3)
EGFR: 64 ML/MIN/1.73M2 — SIGNIFICANT CHANGE UP
EOSINOPHIL # BLD AUTO: 0.21 K/UL — SIGNIFICANT CHANGE UP (ref 0–0.5)
EOSINOPHIL NFR BLD AUTO: 3.3 % — SIGNIFICANT CHANGE UP (ref 0–6)
GLUCOSE BLDC GLUCOMTR-MCNC: 108 MG/DL — HIGH (ref 70–99)
GLUCOSE BLDC GLUCOMTR-MCNC: 141 MG/DL — HIGH (ref 70–99)
GLUCOSE BLDC GLUCOMTR-MCNC: 206 MG/DL — HIGH (ref 70–99)
GLUCOSE BLDC GLUCOMTR-MCNC: 94 MG/DL — SIGNIFICANT CHANGE UP (ref 70–99)
GLUCOSE SERPL-MCNC: 137 MG/DL — HIGH (ref 70–99)
HCT VFR BLD CALC: 41.8 % — SIGNIFICANT CHANGE UP (ref 39–50)
HGB BLD-MCNC: 13.8 G/DL — SIGNIFICANT CHANGE UP (ref 13–17)
IMM GRANULOCYTES NFR BLD AUTO: 0.3 % — SIGNIFICANT CHANGE UP (ref 0–0.9)
LYMPHOCYTES # BLD AUTO: 2.59 K/UL — SIGNIFICANT CHANGE UP (ref 1–3.3)
LYMPHOCYTES # BLD AUTO: 40.8 % — SIGNIFICANT CHANGE UP (ref 13–44)
MAGNESIUM SERPL-MCNC: 1.9 MG/DL — SIGNIFICANT CHANGE UP (ref 1.6–2.6)
MCHC RBC-ENTMCNC: 33 G/DL — SIGNIFICANT CHANGE UP (ref 32–36)
MCHC RBC-ENTMCNC: 33.7 PG — SIGNIFICANT CHANGE UP (ref 27–34)
MCV RBC AUTO: 102 FL — HIGH (ref 80–100)
MONOCYTES # BLD AUTO: 0.52 K/UL — SIGNIFICANT CHANGE UP (ref 0–0.9)
MONOCYTES NFR BLD AUTO: 8.2 % — SIGNIFICANT CHANGE UP (ref 2–14)
NEUTROPHILS # BLD AUTO: 2.97 K/UL — SIGNIFICANT CHANGE UP (ref 1.8–7.4)
NEUTROPHILS NFR BLD AUTO: 46.8 % — SIGNIFICANT CHANGE UP (ref 43–77)
NRBC # BLD: 0 /100 WBCS — SIGNIFICANT CHANGE UP (ref 0–0)
PLATELET # BLD AUTO: 254 K/UL — SIGNIFICANT CHANGE UP (ref 150–400)
POTASSIUM SERPL-MCNC: 4.2 MMOL/L — SIGNIFICANT CHANGE UP (ref 3.5–5.3)
POTASSIUM SERPL-SCNC: 4.2 MMOL/L — SIGNIFICANT CHANGE UP (ref 3.5–5.3)
RBC # BLD: 4.1 M/UL — LOW (ref 4.2–5.8)
RBC # FLD: 13.6 % — SIGNIFICANT CHANGE UP (ref 10.3–14.5)
SODIUM SERPL-SCNC: 141 MMOL/L — SIGNIFICANT CHANGE UP (ref 135–145)
WBC # BLD: 6.35 K/UL — SIGNIFICANT CHANGE UP (ref 3.8–10.5)
WBC # FLD AUTO: 6.35 K/UL — SIGNIFICANT CHANGE UP (ref 3.8–10.5)

## 2023-04-08 PROCEDURE — 99232 SBSQ HOSP IP/OBS MODERATE 35: CPT

## 2023-04-08 RX ADMIN — LOSARTAN POTASSIUM 100 MILLIGRAM(S): 100 TABLET, FILM COATED ORAL at 05:02

## 2023-04-08 RX ADMIN — Medication 100 MILLIGRAM(S): at 11:10

## 2023-04-08 RX ADMIN — APIXABAN 5 MILLIGRAM(S): 2.5 TABLET, FILM COATED ORAL at 05:02

## 2023-04-08 RX ADMIN — Medication 2: at 11:09

## 2023-04-08 RX ADMIN — Medication 1 TABLET(S): at 11:10

## 2023-04-08 RX ADMIN — CEFTRIAXONE 100 MILLIGRAM(S): 500 INJECTION, POWDER, FOR SOLUTION INTRAMUSCULAR; INTRAVENOUS at 11:10

## 2023-04-08 RX ADMIN — APIXABAN 5 MILLIGRAM(S): 2.5 TABLET, FILM COATED ORAL at 17:07

## 2023-04-08 RX ADMIN — ATORVASTATIN CALCIUM 10 MILLIGRAM(S): 80 TABLET, FILM COATED ORAL at 21:25

## 2023-04-08 NOTE — PROGRESS NOTE ADULT - ASSESSMENT
Talked to daughter Shiva Wilson ( [phone]) over the phone  82 years old male HTN, HLD, Afib on apixaban, DM, alcohol use, dementia present to ED with fall. Patient was found on floor on his room, confused and noted tremors. Patient is a very poor historian. He reported chills for 1 day. He admit to alcohol use but cannot say exactly how much. No fever, urinary symptoms, cough, nausea, vomiting, diarrhea or abdominal pain. Per daughter, patient was recently discharged from NH on 3/16/23  Hypertensive, afebrile, sat well at RA. Appear to have chills. No leukocytosis, plt 260, K 4, Cr 1.06, glucose 144, lactate 2. UA appear dirty. CT head with no acute pathology, CT C spine with no fracture. CXR image reviewed, no focal consolidation.       Admitted with fall, UTI                  Atempted to the call the daughter multiple times to provided an update regarding dads condition- sent to voicemail multiple times.

## 2023-04-09 LAB
GLUCOSE BLDC GLUCOMTR-MCNC: 105 MG/DL — HIGH (ref 70–99)
GLUCOSE BLDC GLUCOMTR-MCNC: 118 MG/DL — HIGH (ref 70–99)
GLUCOSE BLDC GLUCOMTR-MCNC: 123 MG/DL — HIGH (ref 70–99)
GLUCOSE BLDC GLUCOMTR-MCNC: 98 MG/DL — SIGNIFICANT CHANGE UP (ref 70–99)

## 2023-04-09 PROCEDURE — 99232 SBSQ HOSP IP/OBS MODERATE 35: CPT

## 2023-04-09 RX ORDER — CEFTRIAXONE 500 MG/1
1000 INJECTION, POWDER, FOR SOLUTION INTRAMUSCULAR; INTRAVENOUS EVERY 24 HOURS
Refills: 0 | Status: COMPLETED | OUTPATIENT
Start: 2023-04-09 | End: 2023-04-11

## 2023-04-09 RX ADMIN — Medication 1 MILLIGRAM(S): at 12:27

## 2023-04-09 RX ADMIN — LOSARTAN POTASSIUM 100 MILLIGRAM(S): 100 TABLET, FILM COATED ORAL at 05:37

## 2023-04-09 RX ADMIN — APIXABAN 5 MILLIGRAM(S): 2.5 TABLET, FILM COATED ORAL at 05:37

## 2023-04-09 RX ADMIN — Medication 1 TABLET(S): at 11:40

## 2023-04-09 RX ADMIN — Medication 100 MILLIGRAM(S): at 11:40

## 2023-04-09 RX ADMIN — ATORVASTATIN CALCIUM 10 MILLIGRAM(S): 80 TABLET, FILM COATED ORAL at 21:50

## 2023-04-09 RX ADMIN — CEFTRIAXONE 100 MILLIGRAM(S): 500 INJECTION, POWDER, FOR SOLUTION INTRAMUSCULAR; INTRAVENOUS at 11:40

## 2023-04-09 NOTE — PROGRESS NOTE ADULT - ASSESSMENT
Talked to daughter Shiva Wilson ( [phone]) over the phone  82 years old male HTN, HLD, Afib on apixaban, DM, alcohol use, dementia present to ED with fall. Patient was found on floor on his room, confused and noted tremors. Patient is a very poor historian. He reported chills for 1 day. He admit to alcohol use but cannot say exactly how much. No fever, urinary symptoms, cough, nausea, vomiting, diarrhea or abdominal pain. Per daughter, patient was recently discharged from NH on 3/16/23  Hypertensive, afebrile, sat well at RA. Appear to have chills. No leukocytosis, plt 260, K 4, Cr 1.06, glucose 144, lactate 2. UA appear dirty. CT head with no acute pathology, CT C spine with no fracture. CXR image reviewed, no focal consolidation.       Admitted with fall, UTI                  Attempted to the call the daughter multiple times to provided an update regarding dads condition- sent to voicemail multiple times.

## 2023-04-10 LAB
CULTURE RESULTS: SIGNIFICANT CHANGE UP
CULTURE RESULTS: SIGNIFICANT CHANGE UP
GLUCOSE BLDC GLUCOMTR-MCNC: 106 MG/DL — HIGH (ref 70–99)
GLUCOSE BLDC GLUCOMTR-MCNC: 118 MG/DL — HIGH (ref 70–99)
GLUCOSE BLDC GLUCOMTR-MCNC: 170 MG/DL — HIGH (ref 70–99)
GLUCOSE BLDC GLUCOMTR-MCNC: 207 MG/DL — HIGH (ref 70–99)
RAPID RVP RESULT: SIGNIFICANT CHANGE UP
SARS-COV-2 RNA SPEC QL NAA+PROBE: SIGNIFICANT CHANGE UP
SPECIMEN SOURCE: SIGNIFICANT CHANGE UP
SPECIMEN SOURCE: SIGNIFICANT CHANGE UP

## 2023-04-10 PROCEDURE — 99232 SBSQ HOSP IP/OBS MODERATE 35: CPT

## 2023-04-10 RX ADMIN — CEFTRIAXONE 100 MILLIGRAM(S): 500 INJECTION, POWDER, FOR SOLUTION INTRAMUSCULAR; INTRAVENOUS at 11:25

## 2023-04-10 RX ADMIN — LOSARTAN POTASSIUM 100 MILLIGRAM(S): 100 TABLET, FILM COATED ORAL at 05:37

## 2023-04-10 RX ADMIN — Medication 2: at 11:24

## 2023-04-10 RX ADMIN — Medication 1 MILLIGRAM(S): at 22:34

## 2023-04-10 RX ADMIN — Medication 1 TABLET(S): at 11:24

## 2023-04-10 RX ADMIN — APIXABAN 5 MILLIGRAM(S): 2.5 TABLET, FILM COATED ORAL at 05:38

## 2023-04-10 RX ADMIN — Medication 1: at 17:41

## 2023-04-10 RX ADMIN — Medication 100 MILLIGRAM(S): at 11:24

## 2023-04-10 RX ADMIN — APIXABAN 5 MILLIGRAM(S): 2.5 TABLET, FILM COATED ORAL at 17:34

## 2023-04-10 RX ADMIN — ATORVASTATIN CALCIUM 10 MILLIGRAM(S): 80 TABLET, FILM COATED ORAL at 21:04

## 2023-04-10 NOTE — PROGRESS NOTE ADULT - ASSESSMENT
Talked to daughter Shiva Wilson ( [phone]) over the phone  82 years old male HTN, HLD, Afib on apixaban, DM, alcohol use, dementia present to ED with fall. Patient was found on floor on his room, confused and noted tremors. Patient is a very poor historian. He reported chills for 1 day. He admit to alcohol use but cannot say exactly how much. No fever, urinary symptoms, cough, nausea, vomiting, diarrhea or abdominal pain. Per daughter, patient was recently discharged from NH on 3/16/23  Hypertensive, afebrile, sat well at RA. Appear to have chills. No leukocytosis, plt 260, K 4, Cr 1.06, glucose 144, lactate 2. UA appear dirty. CT head with no acute pathology, CT C spine with no fracture. CXR image reviewed, no focal consolidation.       Admitted with fall, UTI                  Attempted to the call the daughter multiple times to provided an update regarding dads condition- sent to voicemail multiple times.

## 2023-04-11 ENCOUNTER — TRANSCRIPTION ENCOUNTER (OUTPATIENT)
Age: 83
End: 2023-04-11

## 2023-04-11 VITALS
OXYGEN SATURATION: 98 % | SYSTOLIC BLOOD PRESSURE: 128 MMHG | RESPIRATION RATE: 18 BRPM | TEMPERATURE: 98 F | HEART RATE: 76 BPM | DIASTOLIC BLOOD PRESSURE: 75 MMHG

## 2023-04-11 LAB
GLUCOSE BLDC GLUCOMTR-MCNC: 128 MG/DL — HIGH (ref 70–99)
GLUCOSE BLDC GLUCOMTR-MCNC: 175 MG/DL — HIGH (ref 70–99)

## 2023-04-11 PROCEDURE — 99239 HOSP IP/OBS DSCHRG MGMT >30: CPT

## 2023-04-11 RX ORDER — THIAMINE MONONITRATE (VIT B1) 100 MG
1 TABLET ORAL
Qty: 0 | Refills: 0 | DISCHARGE
Start: 2023-04-11

## 2023-04-11 RX ORDER — APIXABAN 2.5 MG/1
1 TABLET, FILM COATED ORAL
Qty: 0 | Refills: 0 | DISCHARGE
Start: 2023-04-11

## 2023-04-11 RX ORDER — ATORVASTATIN CALCIUM 80 MG/1
1 TABLET, FILM COATED ORAL
Qty: 0 | Refills: 0 | DISCHARGE
Start: 2023-04-11

## 2023-04-11 RX ORDER — CHOLECALCIFEROL (VITAMIN D3) 125 MCG
1 CAPSULE ORAL
Refills: 0 | DISCHARGE

## 2023-04-11 RX ORDER — ACETAMINOPHEN 500 MG
2 TABLET ORAL
Refills: 0 | DISCHARGE

## 2023-04-11 RX ORDER — LOSARTAN POTASSIUM 100 MG/1
1 TABLET, FILM COATED ORAL
Qty: 0 | Refills: 0 | DISCHARGE
Start: 2023-04-11

## 2023-04-11 RX ORDER — MECLIZINE HCL 12.5 MG
1 TABLET ORAL
Qty: 0 | Refills: 0 | DISCHARGE

## 2023-04-11 RX ORDER — PRIMIDONE 250 MG/1
50 TABLET ORAL
Qty: 0 | Refills: 0 | DISCHARGE

## 2023-04-11 RX ORDER — ACETAMINOPHEN 500 MG
2 TABLET ORAL
Qty: 0 | Refills: 0 | DISCHARGE
Start: 2023-04-11

## 2023-04-11 RX ORDER — THIAMINE MONONITRATE (VIT B1) 100 MG
2 TABLET ORAL
Refills: 0 | DISCHARGE

## 2023-04-11 RX ORDER — LANOLIN ALCOHOL/MO/W.PET/CERES
1 CREAM (GRAM) TOPICAL
Qty: 0 | Refills: 0 | DISCHARGE
Start: 2023-04-11

## 2023-04-11 RX ADMIN — Medication 100 MILLIGRAM(S): at 11:36

## 2023-04-11 RX ADMIN — Medication 1: at 11:36

## 2023-04-11 RX ADMIN — LOSARTAN POTASSIUM 100 MILLIGRAM(S): 100 TABLET, FILM COATED ORAL at 05:07

## 2023-04-11 RX ADMIN — APIXABAN 5 MILLIGRAM(S): 2.5 TABLET, FILM COATED ORAL at 05:07

## 2023-04-11 RX ADMIN — Medication 1 TABLET(S): at 11:35

## 2023-04-11 RX ADMIN — CEFTRIAXONE 100 MILLIGRAM(S): 500 INJECTION, POWDER, FOR SOLUTION INTRAMUSCULAR; INTRAVENOUS at 11:35

## 2023-04-11 NOTE — PROGRESS NOTE ADULT - PROBLEM SELECTOR PROBLEM 7
Hyperlipidemia, unspecified

## 2023-04-11 NOTE — DISCHARGE NOTE PROVIDER - HOSPITAL COURSE
82 years old male HTN, HLD, Afib on apixaban, DM, alcohol use, dementia present to ED with fall. Patient was found on floor on his room, confused and noted tremors. Patient is a very poor historian. He reported chills for 1 day. He admit to alcohol use but cannot say exactly how much. No fever, urinary symptoms, cough, nausea, vomiting, diarrhea or abdominal pain. Per daughter, patient was recently discharged from NH on 3/16/23  Hypertensive, afebrile, sat well at RA. Appear to have chills. No leukocytosis, plt 260, K 4, Cr 1.06, glucose 144, lactate 2. UA appear dirty. CT head with no acute pathology, CT C spine with no fracture. CXR image reviewed, no focal consolidation.     Admitted with fall, UTI       Fall.   - generalized weakness due to UTI vs mild alcohol withdrawal  - CT head with no acute pathology, CT C spine with no fracture  - No focal bony tenderness  - PT evaluation rehab   - completed a course of abx for UTI denies any  symptoms  - Ecoli. noted on urine culture sensitive to rocephin  -  CIWA protocol with symptoms trigger ativan  thiamine  S/W consult- daughter unable to take care of patient. Patient is refusing medications at home as well  - Dc planning rehab     Acute cystitis without hematuria.   -UA appear dirty  -Patient has chills and rigors during exam  -Continue ceftriaxone, Urine culture positive for e.coil  - will give a total 7 day course  - today is last day of abx.    Moderate alcohol use disorder.   -patient admit to vodka use, but unable to tell exactly how much he drinks daily  - Mildly tremulous in ED, received benzo  -Thiamine, folic acid, multivitamin  -CIWA protocol with symptoms trigger ativan  - Monitor for DT.    Unspecified atrial fibrillation.   - on apixaban 5mg bid.     Benign essential HTN.   -on losartan 100mg daily  -Monitor BP and titrate BP med.    Type 2 diabetes mellitus with unspecified complications.   - on metformin 500mg daily at home  -ISS and hypoglycemia protocol.     Hyperlipidemia, unspecified.   - on atorvastatin 10mg hs at home.    On 4/11/2023, discussed with Dr. Morales, patient is medically cleared and optimized for discharge today. All medications were reviewed with attending, and sent to mutually agreed upon pharmacy.   82 years old male HTN, HLD, Afib on apixaban, DM, alcohol use, dementia present to ED with fall. Patient was found on floor on his room, confused and noted tremors. Patient is a very poor historian. He reported chills for 1 day. He admit to alcohol use but cannot say exactly how much. No fever, urinary symptoms, cough, nausea, vomiting, diarrhea or abdominal pain. Per daughter, patient was recently discharged from NH on 3/16/23  Hypertensive, afebrile, sat well at RA. Appear to have chills. No leukocytosis, plt 260, K 4, Cr 1.06, glucose 144, lactate 2. UA appear dirty. CT head with no acute pathology, CT C spine with no fracture. CXR image reviewed, no focal consolidation.     Admitted with fall, UTI     Fall.   - generalized weakness due to UTI vs mild alcohol withdrawal  - CT head with no acute pathology, CT C spine with no fracture  - No focal bony tenderness  - PT evaluation rehab   - completed a course of abx for UTI denies any  symptoms  - Ecoli. noted on urine culture sensitive to rocephin  -  CIWA protocol with symptoms trigger ativan  thiamine  S/W consult- daughter unable to take care of patient. Patient is refusing medications at home as well  - Dc planning rehab     Acute cystitis without hematuria.   -UA appear dirty  -Patient has chills and rigors during exam  -Continue ceftriaxone, Urine culture positive for e.coil  - will give a total 7 day course  - today is last day of abx.    Moderate alcohol use disorder.   -patient admit to vodka use, but unable to tell exactly how much he drinks daily  - Mildly tremulous in ED, received benzo  -Thiamine, folic acid, multivitamin  -CIWA protocol with symptoms trigger ativan  - Monitor for DT.    Unspecified atrial fibrillation.   - on apixaban 5mg bid.     Benign essential HTN.   -on losartan 100mg daily  -Monitor BP and titrate BP med.    Type 2 diabetes mellitus with unspecified complications.   - on metformin 500mg daily at home  -ISS and hypoglycemia protocol.     Hyperlipidemia, unspecified.   - on atorvastatin 10mg hs at home.    On 4/11/2023, discussed with Dr. Morales, patient is medically cleared and optimized for discharge today. All medications were reviewed with attending, and sent to mutually agreed upon pharmacy.   82 years old male HTN, HLD, Afib on apixaban, DM, alcohol use, dementia present to ED with fall. Patient was found on floor on his room, confused and noted tremors. Patient is a very poor historian. He reported chills for 1 day. He admit to alcohol use but cannot say exactly how much. No fever, urinary symptoms, cough, nausea, vomiting, diarrhea or abdominal pain. Per daughter, patient was recently discharged from NH on 3/16/23  Hypertensive, afebrile, sat well at RA. Appear to have chills. No leukocytosis, plt 260, K 4, Cr 1.06, glucose 144, lactate 2. UA appear dirty. CT head with no acute pathology, CT C spine with no fracture. CXR image reviewed, no focal consolidation.     Admitted with fall, UTI     Fall.   - generalized weakness due to UTI vs mild alcohol withdrawal  - CT head with no acute pathology, CT C spine with no fracture  - No focal bony tenderness  - PT evaluation rehab   - completed a course of abx for UTI denies any  symptoms  - Ecoli. noted on urine culture sensitive to Rocephin  -  CIWA protocol with symptoms trigger ativan  thiamine  S/W consult- daughter unable to take care of patient. Patient is refusing medications at home as well  - Dc planning rehab     Acute cystitis without hematuria.   -UA appear dirty  -Patient has chills and rigors during exam  -Continue ceftriaxone, Urine culture positive for e.coil  - will give a total 7 day course  - today is last day of abx.    Moderate alcohol use disorder.   -patient admit to vodka use, but unable to tell exactly how much he drinks daily  - Mildly tremulous in ED, received benzo  -Thiamine, folic acid, multivitamin  -CIWA protocol with symptoms trigger ativan  - Monitor for DT.    Unspecified atrial fibrillation.   - on apixaban 5mg bid.     Benign essential HTN.   -Monitor BP and titrate BP med.    Type 2 diabetes mellitus with unspecified complications.   - on metformin 500mg daily at home  -ISS and hypoglycemia protocol.     Hyperlipidemia, unspecified.   - on atorvastatin 10mg hs at home.    On 4/11/2023, discussed with Dr. Morales, patient is medically cleared and optimized for discharge today. All medications were reviewed with attending, and sent to mutually agreed upon pharmacy.

## 2023-04-11 NOTE — PROGRESS NOTE ADULT - PROBLEM SELECTOR PLAN 1
generalized weakness due to UTI vs mild alcohol withdrawal  CT head with no acute pathology, CT C spine with no fracture  No focal bony tenderness  PT evaluation  - completed a course of abx for UTI denies any  symptoms  - Ecoli. noted on urine culture sensitive to rocephin  [facility] protocol with symptoms trigger ativan  thiamine  S/W consult- daughter unable to take care of patient. Patient is refusing medications at home as well
generalized weakness due to UTI vs mild alcohol withdrawal  CT head with no acute pathology, CT C spine with no fracture  No focal bony tenderness  PT evaluation  - completed a course of abx for UTI denies any  symptoms  - Ecoli. noted on urine culture sensitive to rocephin  [facility] protocol with symptoms trigger ativan  thiamine  S/W consult- daughter unable to take care of patient. Patient is refusing medications at home as well
generalized weakness due to UTI vs mild alcohol withdrawal  CT head with no acute pathology, CT C spine with no fracture  No focal bony tenderness  PT evaluation  - completed a course of abx for UTI denies any  symptoms  - Ecoli. noted on urine culture sensitive to rocephin  [facility] protocol with symptoms trigger ativan  thiamine  S/W consult- daughter unable to take care of patient. Patient is refusing medications at home as well
generalized weakness due to UTI vs mild alcohol withdrawal  CT head with no acute pathology, CT C spine with no fracture  No focal bony tenderness  PT evaluation  Ceftriaxone for UTI  CIWA protocol with symptoms trigger ativan  thiamine  S/W consult- daughter unable to take care of patient. Patient is refusing medications at home as well
generalized weakness due to UTI vs mild alcohol withdrawal  CT head with no acute pathology, CT C spine with no fracture  No focal bony tenderness  PT evaluation  Ceftriaxone for UTI  CIWA protocol with symptoms trigger ativan  thiamine  S/W consult- daughter unable to take care of patient. Patient is refusing medications at home as well
generalized weakness due to UTI vs mild alcohol withdrawal  CT head with no acute pathology, CT C spine with no fracture  No focal bony tenderness  PT evaluation  - completed a course of abx for UTI denies any  symptoms  - Ecoli. noted on urine culture sensitive to rocephin  [facility] protocol with symptoms trigger ativan  thiamine  S/W consult- daughter unable to take care of patient. Patient is refusing medications at home as well  - Pending authorization for discharge

## 2023-04-11 NOTE — PROGRESS NOTE ADULT - SUBJECTIVE AND OBJECTIVE BOX
CC: Patient is a 82y old  Male who presents with a chief complaint of fall, UTI, alcohol use disorder with possible withdrawal (05 Apr 2023 14:54)      Patient seen and examined at bedside, No acute overnight events. Patient denies any acute complaints- denies auditory nor visual hallucinations    ROS: Denies fever, nausea, vomiting, chest pain, SOB, abdominal pain, diarrhea and constipation    Vital Sign  Vital Signs Last 24 Hrs  T(C): 36.3 (06 Apr 2023 11:00), Max: 36.7 (05 Apr 2023 13:40)  T(F): 97.4 (06 Apr 2023 11:00), Max: 98.1 (05 Apr 2023 13:40)  HR: 80 (06 Apr 2023 11:00) (63 - 84)  BP: 135/74 (06 Apr 2023 11:00) (128/84 - 178/96)  BP(mean): 103 (05 Apr 2023 20:54) (103 - 103)  RR: 18 (06 Apr 2023 11:00) (16 - 20)  SpO2: 98% (06 Apr 2023 11:00) (97% - 98%)    Parameters below as of 06 Apr 2023 11:00  Patient On (Oxygen Delivery Method): room air        Physical Exam:   Gen: NAD  HEENT: NCAT, EOMI, PERRLA, Pupils_  CVS: RRR, +S1/S2, no murmurs, rubs or gallops appreciated  Lungs: CTAB, no wheeze, rales, rhonchi  Abdomen: +BS, soft, ND, NT. no palpable flank tenderness or mass, no CVA tenderness  Ext: No cyanosis, edema or calf tenderness  Neuro: AAOx3, no focal deficits.    Labs:                        13.1   6.00  )-----------( 238      ( 06 Apr 2023 07:45 )             39.6   04-06    138  |  105  |  9   ----------------------------<  117<H>  4.0   |  28  |  1.01    Ca    9.0      06 Apr 2023 07:45  Phos  3.6     04-06  Mg     2.1     04-05    TPro  7.0  /  Alb  2.9<L>  /  TBili  0.7  /  DBili  x   /  AST  9<L>  /  ALT  22  /  AlkPhos  98  04-06        Radiology:  *Pull    Medications:  MEDICATIONS  (STANDING):  apixaban 5 milliGRAM(s) Oral two times a day  atorvastatin 10 milliGRAM(s) Oral at bedtime  cefTRIAXone   IVPB 1000 milliGRAM(s) IV Intermittent every 24 hours  dextrose 5%. 1000 milliLiter(s) (50 mL/Hr) IV Continuous <Continuous>  dextrose 5%. 1000 milliLiter(s) (100 mL/Hr) IV Continuous <Continuous>  dextrose 50% Injectable 25 Gram(s) IV Push once  dextrose 50% Injectable 12.5 Gram(s) IV Push once  dextrose 50% Injectable 25 Gram(s) IV Push once  glucagon  Injectable 1 milliGRAM(s) IntraMuscular once  insulin lispro (ADMELOG) corrective regimen sliding scale   SubCutaneous three times a day before meals  insulin lispro (ADMELOG) corrective regimen sliding scale   SubCutaneous at bedtime  losartan 100 milliGRAM(s) Oral daily  multivitamin 1 Tablet(s) Oral daily  thiamine 100 milliGRAM(s) Oral daily    MEDICATIONS  (PRN):  acetaminophen     Tablet .. 650 milliGRAM(s) Oral every 6 hours PRN Temp greater or equal to 38C (100.4F), Mild Pain (1 - 3), Moderate Pain (4 - 6)  dextrose Oral Gel 15 Gram(s) Oral once PRN Blood Glucose LESS THAN 70 milliGRAM(s)/deciliter  LORazepam   Injectable 1 milliGRAM(s) IV Push every 1 hour PRN CIWA-Ar score 8 or greater  melatonin 3 milliGRAM(s) Oral at bedtime PRN Insomnia  
CC: Patient is a 82y old  Male who presents with a chief complaint of fall, UTI, alcohol use disorder with possible withdrawal (09 Apr 2023 10:49)      Patient seen and examined at bedside, No acute overnight events.    ROS: Denies fever, nausea, vomiting, chest pain, SOB, abdominal pain, diarrhea and constipation    Vital Sign  Vital Signs Last 24 Hrs  T(C): 36.6 (10 Apr 2023 12:14), Max: 36.7 (09 Apr 2023 17:09)  T(F): 97.8 (10 Apr 2023 12:14), Max: 98 (09 Apr 2023 17:09)  HR: 94 (10 Apr 2023 12:14) (78 - 94)  BP: 115/77 (10 Apr 2023 12:14) (115/77 - 167/96)  BP(mean): --  RR: 17 (10 Apr 2023 12:14) (17 - 18)  SpO2: 98% (10 Apr 2023 12:14) (98% - 98%)    Parameters below as of 10 Apr 2023 05:00  Patient On (Oxygen Delivery Method): room air        Physical Exam:   Gen: NAD  HEENT: NCAT, EOMI, PERRLA, Pupils_  CVS: RRR, +S1/S2, no murmurs, rubs or gallops appreciated  Lungs: CTAB, no wheeze, rales, rhonchi  Abdomen: +BS, soft, ND, NT. no palpable flank tenderness or mass, no CVA tenderness  Ext: No cyanosis, edema or calf tenderness  Neuro: AAOx3, no focal deficits.    Labs:            Radiology:  *Pull    Medications:  MEDICATIONS  (STANDING):  apixaban 5 milliGRAM(s) Oral two times a day  atorvastatin 10 milliGRAM(s) Oral at bedtime  cefTRIAXone   IVPB 1000 milliGRAM(s) IV Intermittent every 24 hours  dextrose 5%. 1000 milliLiter(s) (50 mL/Hr) IV Continuous <Continuous>  dextrose 5%. 1000 milliLiter(s) (100 mL/Hr) IV Continuous <Continuous>  dextrose 50% Injectable 25 Gram(s) IV Push once  dextrose 50% Injectable 12.5 Gram(s) IV Push once  dextrose 50% Injectable 25 Gram(s) IV Push once  glucagon  Injectable 1 milliGRAM(s) IntraMuscular once  insulin lispro (ADMELOG) corrective regimen sliding scale   SubCutaneous three times a day before meals  insulin lispro (ADMELOG) corrective regimen sliding scale   SubCutaneous at bedtime  losartan 100 milliGRAM(s) Oral daily  multivitamin 1 Tablet(s) Oral daily  thiamine 100 milliGRAM(s) Oral daily    MEDICATIONS  (PRN):  acetaminophen     Tablet .. 650 milliGRAM(s) Oral every 6 hours PRN Temp greater or equal to 38C (100.4F), Mild Pain (1 - 3), Moderate Pain (4 - 6)  dextrose Oral Gel 15 Gram(s) Oral once PRN Blood Glucose LESS THAN 70 milliGRAM(s)/deciliter  LORazepam   Injectable 1 milliGRAM(s) IV Push every 1 hour PRN CIWA-Ar score 8 or greater  melatonin 3 milliGRAM(s) Oral at bedtime PRN Insomnia  
CC: Patient is a 82y old  Male who presents with a chief complaint of fall, UTI, alcohol use disorder with possible withdrawal (06 Apr 2023 13:15)      Patient seen and examined at bedside, No acute overnight events.    ROS: Denies fever, nausea, vomiting, chest pain, SOB, abdominal pain, diarrhea and constipation    Vital Sign  Vital Signs Last 24 Hrs  T(C): 36.5 (07 Apr 2023 11:53), Max: 36.9 (07 Apr 2023 00:20)  T(F): 97.7 (07 Apr 2023 11:53), Max: 98.4 (07 Apr 2023 00:20)  HR: 76 (07 Apr 2023 11:53) (76 - 87)  BP: 123/74 (07 Apr 2023 11:53) (123/74 - 156/84)  BP(mean): --  RR: 17 (07 Apr 2023 11:53) (17 - 18)  SpO2: 97% (07 Apr 2023 11:53) (97% - 98%)    Parameters below as of 07 Apr 2023 11:53  Patient On (Oxygen Delivery Method): room air        Physical Exam:   Gen: NAD  HEENT: NCAT, EOMI, PERRLA, Pupils_  CVS: RRR, +S1/S2, no murmurs, rubs or gallops appreciated  Lungs: CTAB, no wheeze, rales, rhonchi  Abdomen: +BS, soft, ND, NT. no palpable flank tenderness or mass, no CVA tenderness  Ext: No cyanosis, edema or calf tenderness  Neuro: AAOx3, no focal deficits.    Labs:                        13.5   6.56  )-----------( 245      ( 07 Apr 2023 08:06 )             41.1   04-07    138  |  106  |  12  ----------------------------<  136<H>  3.7   |  26  |  1.02    Ca    9.2      07 Apr 2023 08:06  Phos  3.6     04-07  Mg     1.9     04-07    TPro  7.0  /  Alb  2.9<L>  /  TBili  0.7  /  DBili  x   /  AST  9<L>  /  ALT  22  /  AlkPhos  98  04-06        Radiology:  *Pull    Medications:  MEDICATIONS  (STANDING):  apixaban 5 milliGRAM(s) Oral two times a day  atorvastatin 10 milliGRAM(s) Oral at bedtime  cefTRIAXone   IVPB 1000 milliGRAM(s) IV Intermittent every 24 hours  dextrose 5%. 1000 milliLiter(s) (50 mL/Hr) IV Continuous <Continuous>  dextrose 5%. 1000 milliLiter(s) (100 mL/Hr) IV Continuous <Continuous>  dextrose 50% Injectable 25 Gram(s) IV Push once  dextrose 50% Injectable 12.5 Gram(s) IV Push once  dextrose 50% Injectable 25 Gram(s) IV Push once  glucagon  Injectable 1 milliGRAM(s) IntraMuscular once  insulin lispro (ADMELOG) corrective regimen sliding scale   SubCutaneous three times a day before meals  insulin lispro (ADMELOG) corrective regimen sliding scale   SubCutaneous at bedtime  losartan 100 milliGRAM(s) Oral daily  multivitamin 1 Tablet(s) Oral daily  thiamine 100 milliGRAM(s) Oral daily    MEDICATIONS  (PRN):  acetaminophen     Tablet .. 650 milliGRAM(s) Oral every 6 hours PRN Temp greater or equal to 38C (100.4F), Mild Pain (1 - 3), Moderate Pain (4 - 6)  dextrose Oral Gel 15 Gram(s) Oral once PRN Blood Glucose LESS THAN 70 milliGRAM(s)/deciliter  LORazepam   Injectable 1 milliGRAM(s) IV Push every 1 hour PRN CIWA-Ar score 8 or greater  melatonin 3 milliGRAM(s) Oral at bedtime PRN Insomnia  
CC: Patient is a 82y old  Male who presents with a chief complaint of fall, UTI, alcohol use disorder with possible withdrawal (07 Apr 2023 13:33)      Patient seen and examined at bedside, No acute overnight events.    ROS: Denies fever, nausea, vomiting, chest pain, SOB, abdominal pain, diarrhea and constipation    Vital Sign  Vital Signs Last 24 Hrs  T(C): 36.6 (08 Apr 2023 11:33), Max: 36.7 (07 Apr 2023 16:51)  T(F): 97.8 (08 Apr 2023 11:33), Max: 98 (07 Apr 2023 16:51)  HR: 79 (08 Apr 2023 11:33) (71 - 83)  BP: 101/72 (08 Apr 2023 11:33) (101/72 - 162/79)  BP(mean): --  RR: 17 (08 Apr 2023 11:33) (16 - 18)  SpO2: 97% (08 Apr 2023 11:33) (97% - 98%)    Parameters below as of 08 Apr 2023 11:33  Patient On (Oxygen Delivery Method): room air        Physical Exam:   Gen: NAD  HEENT: NCAT, EOMI, PERRLA, Pupils_  CVS: RRR, +S1/S2, no murmurs, rubs or gallops appreciated  Lungs: CTAB, no wheeze, rales, rhonchi  Abdomen: +BS, soft, ND, NT. no palpable flank tenderness or mass, no CVA tenderness  Ext: No cyanosis, edema or calf tenderness  Neuro: AAOx3, no focal deficits.    Labs:                        13.8   6.35  )-----------( 254      ( 08 Apr 2023 07:20 )             41.8   04-08    141  |  109<H>  |  12  ----------------------------<  137<H>  4.2   |  29  |  1.14    Ca    9.2      08 Apr 2023 07:20  Phos  3.6     04-07  Mg     1.9     04-08 04-07 @ 07:01  -  04-08 @ 07:00  --------------------------------------------------------  IN: 540 mL / OUT: 0 mL / NET: 540 mL        Radiology:  *Pull    Medications:  MEDICATIONS  (STANDING):  apixaban 5 milliGRAM(s) Oral two times a day  atorvastatin 10 milliGRAM(s) Oral at bedtime  dextrose 5%. 1000 milliLiter(s) (50 mL/Hr) IV Continuous <Continuous>  dextrose 5%. 1000 milliLiter(s) (100 mL/Hr) IV Continuous <Continuous>  dextrose 50% Injectable 25 Gram(s) IV Push once  dextrose 50% Injectable 12.5 Gram(s) IV Push once  dextrose 50% Injectable 25 Gram(s) IV Push once  glucagon  Injectable 1 milliGRAM(s) IntraMuscular once  insulin lispro (ADMELOG) corrective regimen sliding scale   SubCutaneous three times a day before meals  insulin lispro (ADMELOG) corrective regimen sliding scale   SubCutaneous at bedtime  losartan 100 milliGRAM(s) Oral daily  multivitamin 1 Tablet(s) Oral daily  thiamine 100 milliGRAM(s) Oral daily    MEDICATIONS  (PRN):  acetaminophen     Tablet .. 650 milliGRAM(s) Oral every 6 hours PRN Temp greater or equal to 38C (100.4F), Mild Pain (1 - 3), Moderate Pain (4 - 6)  dextrose Oral Gel 15 Gram(s) Oral once PRN Blood Glucose LESS THAN 70 milliGRAM(s)/deciliter  LORazepam   Injectable 1 milliGRAM(s) IV Push every 1 hour PRN CIWA-Ar score 8 or greater  melatonin 3 milliGRAM(s) Oral at bedtime PRN Insomnia  
CC: Patient is a 82y old  Male who presents with a chief complaint of fall, UTI, alcohol use disorder with possible withdrawal (08 Apr 2023 13:06)      Patient seen and examined at bedside, No acute overnight events.    ROS: Denies fever, nausea, vomiting, chest pain, SOB, abdominal pain, diarrhea and constipation    Vital Sign  Vital Signs Last 24 Hrs  T(C): 36.7 (09 Apr 2023 05:01), Max: 36.7 (09 Apr 2023 05:01)  T(F): 98.1 (09 Apr 2023 05:01), Max: 98.1 (09 Apr 2023 05:01)  HR: 72 (09 Apr 2023 05:01) (72 - 104)  BP: 125/78 (09 Apr 2023 05:01) (101/72 - 148/78)  BP(mean): --  RR: 18 (09 Apr 2023 05:01) (17 - 18)  SpO2: 97% (09 Apr 2023 05:01) (97% - 99%)    Parameters below as of 09 Apr 2023 00:03  Patient On (Oxygen Delivery Method): room air        Physical Exam:   Gen: NAD  HEENT: NCAT, EOMI, PERRLA, Pupils_  CVS: RRR, +S1/S2, no murmurs, rubs or gallops appreciated  Lungs: CTAB, no wheeze, rales, rhonchi  Abdomen: +BS, soft, ND, NT. no palpable flank tenderness or mass, no CVA tenderness  Ext: No cyanosis, edema or calf tenderness  Neuro: AAOx3, no focal deficits.    Labs:                        13.8   6.35  )-----------( 254      ( 08 Apr 2023 07:20 )             41.8   04-08    141  |  109<H>  |  12  ----------------------------<  137<H>  4.2   |  29  |  1.14    Ca    9.2      08 Apr 2023 07:20  Mg     1.9     04-08 04-08 @ 07:01  -  04-09 @ 07:00  --------------------------------------------------------  IN: 1020 mL / OUT: 0 mL / NET: 1020 mL        Radiology:  *Pull    Medications:  MEDICATIONS  (STANDING):  apixaban 5 milliGRAM(s) Oral two times a day  atorvastatin 10 milliGRAM(s) Oral at bedtime  dextrose 5%. 1000 milliLiter(s) (50 mL/Hr) IV Continuous <Continuous>  dextrose 5%. 1000 milliLiter(s) (100 mL/Hr) IV Continuous <Continuous>  dextrose 50% Injectable 25 Gram(s) IV Push once  dextrose 50% Injectable 12.5 Gram(s) IV Push once  dextrose 50% Injectable 25 Gram(s) IV Push once  glucagon  Injectable 1 milliGRAM(s) IntraMuscular once  insulin lispro (ADMELOG) corrective regimen sliding scale   SubCutaneous three times a day before meals  insulin lispro (ADMELOG) corrective regimen sliding scale   SubCutaneous at bedtime  losartan 100 milliGRAM(s) Oral daily  multivitamin 1 Tablet(s) Oral daily  thiamine 100 milliGRAM(s) Oral daily    MEDICATIONS  (PRN):  acetaminophen     Tablet .. 650 milliGRAM(s) Oral every 6 hours PRN Temp greater or equal to 38C (100.4F), Mild Pain (1 - 3), Moderate Pain (4 - 6)  dextrose Oral Gel 15 Gram(s) Oral once PRN Blood Glucose LESS THAN 70 milliGRAM(s)/deciliter  LORazepam   Injectable 1 milliGRAM(s) IV Push every 1 hour PRN CIWA-Ar score 8 or greater  melatonin 3 milliGRAM(s) Oral at bedtime PRN Insomnia  
CC: Patient is a 82y old  Male who presents with a chief complaint of fall, UTI, alcohol use disorder with possible withdrawal (10 Apr 2023 14:49)      Patient seen and examined at bedside, No acute overnight events.  Patient ambulating, eating well, voiding and last Bowel movement was   Cardiac monitor reviewed;    ROS: Denies fever, nausea, vomiting, chest pain, SOB, abdominal pain, diarrhea and constipation    Vital Sign  Vital Signs Last 24 Hrs  T(C): 36.4 (11 Apr 2023 11:00), Max: 36.8 (10 Apr 2023 23:31)  T(F): 97.5 (11 Apr 2023 11:00), Max: 98.2 (10 Apr 2023 23:31)  HR: 76 (11 Apr 2023 11:00) (76 - 96)  BP: 128/75 (11 Apr 2023 11:00) (115/77 - 157/83)  BP(mean): --  RR: 18 (11 Apr 2023 11:00) (17 - 18)  SpO2: 98% (11 Apr 2023 11:00) (95% - 98%)    Parameters below as of 11 Apr 2023 11:00  Patient On (Oxygen Delivery Method): room air        Physical Exam:   Gen: NAD  HEENT: NCAT, EOMI, PERRLA, Pupils_  CVS: RRR, +S1/S2, no murmurs, rubs or gallops appreciated  Lungs: CTAB, no wheeze, rales, rhonchi  Abdomen: +BS, soft, ND, NT. no palpable flank tenderness or mass, no CVA tenderness  Ext: No cyanosis, edema or calf tenderness  Neuro: AAOx3, no focal deficits.    Labs:            Radiology:  *Pull    Medications:  MEDICATIONS  (STANDING):  apixaban 5 milliGRAM(s) Oral two times a day  atorvastatin 10 milliGRAM(s) Oral at bedtime  dextrose 5%. 1000 milliLiter(s) (50 mL/Hr) IV Continuous <Continuous>  dextrose 5%. 1000 milliLiter(s) (100 mL/Hr) IV Continuous <Continuous>  dextrose 50% Injectable 25 Gram(s) IV Push once  dextrose 50% Injectable 12.5 Gram(s) IV Push once  dextrose 50% Injectable 25 Gram(s) IV Push once  glucagon  Injectable 1 milliGRAM(s) IntraMuscular once  insulin lispro (ADMELOG) corrective regimen sliding scale   SubCutaneous three times a day before meals  insulin lispro (ADMELOG) corrective regimen sliding scale   SubCutaneous at bedtime  losartan 100 milliGRAM(s) Oral daily  multivitamin 1 Tablet(s) Oral daily  thiamine 100 milliGRAM(s) Oral daily    MEDICATIONS  (PRN):  acetaminophen     Tablet .. 650 milliGRAM(s) Oral every 6 hours PRN Temp greater or equal to 38C (100.4F), Mild Pain (1 - 3), Moderate Pain (4 - 6)  dextrose Oral Gel 15 Gram(s) Oral once PRN Blood Glucose LESS THAN 70 milliGRAM(s)/deciliter  LORazepam   Injectable 1 milliGRAM(s) IV Push every 1 hour PRN CIWA-Ar score 8 or greater  melatonin 3 milliGRAM(s) Oral at bedtime PRN Insomnia

## 2023-04-11 NOTE — DISCHARGE NOTE NURSING/CASE MANAGEMENT/SOCIAL WORK - NSDCPEFALRISK_GEN_ALL_CORE
For information on Fall & Injury Prevention, visit: [email or web address] OR  [email or web address] OR  https://www.cdc.gov/steadi/patient.html

## 2023-04-11 NOTE — PROGRESS NOTE ADULT - PROBLEM SELECTOR PLAN 7
on atorvastatin 10mg hs at home

## 2023-04-11 NOTE — DISCHARGE NOTE PROVIDER - NSDCCPCAREPLAN_GEN_ALL_CORE_FT
PRINCIPAL DISCHARGE DIAGNOSIS  Diagnosis: Fall  Assessment and Plan of Treatment: Fall precautions: keep your area clutter free, place night lights in hallways and stairwells, and add non-slip mats in the kitchen and bathrooms.  Exercise daily to improve muscle strength to avoid deconditioning.        SECONDARY DISCHARGE DIAGNOSES  Diagnosis: Unspecified atrial fibrillation  Assessment and Plan of Treatment: Please take your medications as prescribed.  Continue to take your blood thinner as prescribed and follow with your physician to monitor your levels.  Low fat diet, reduce caffeine intake, and exercise at least 30 minutes daily.      Diagnosis: Type 2 diabetes mellitus with unspecified complications  Assessment and Plan of Treatment: Monitor finger sticks pre-meal and bedtime, low salt, fat and carbohydrate diet, minimize glucose intake.  Exercise daily for at least 30 minutes and weight loss.  Follow up with primary care physician and endocrinologist for routine Hemoglobin A1C checks and management.  Follow up with your ophthalmologist for routine yearly vision exams.      Diagnosis: Benign essential HTN  Assessment and Plan of Treatment: Low sodium and fat diet, continue anti-hypertensive medications, and follow up with primary care physician.      Diagnosis: Moderate alcohol use disorder  Assessment and Plan of Treatment: Attend alcoholism treatment program, practice the Twelve Steps of AA.  Go to meetings to help you cope with uncomfortable feelings, and to help you develop a relapse prevention plan to prevent complications associated with alcohol intoxication.      Diagnosis: Acute UTI  Assessment and Plan of Treatment: You were treated with antibiotics for a urinary tract infection.

## 2023-04-11 NOTE — PROGRESS NOTE ADULT - PROBLEM SELECTOR PLAN 2
UA appear dirty  Patient has chills and rigors during exam  Continue ceftriaxone, Urine culture positive for e.coil
UA appear dirty  Patient has chills and rigors during exam  Continue ceftriaxone, Urine culture positive for e.coil
UA appear dirty  Patient has chills and rigors during exam  Continue ceftriaxone, Urine culture positive for e.coil  - will give a total 7 day course  - today is last day of abx
UA appear dirty  Patient has chills and rigors during exam  Continue ceftriaxone x 3 days, follow urine culture. If bacteremic, will need to extend course of antibiotics
UA appear dirty  Patient has chills and rigors during exam  Continue ceftriaxone, Urine culture positive for e.coil  - will give a total 7 day course
UA appear dirty  Patient has chills and rigors during exam  Continue ceftriaxone, Urine culture positive for e.coil  - will give a total 7 day course

## 2023-04-11 NOTE — DISCHARGE NOTE PROVIDER - NSDCMRMEDTOKEN_GEN_ALL_CORE_FT
atorvastatin 10 mg oral tablet: 1 tab(s) orally once a day (at bedtime)  Eliquis 5 mg oral tablet: 1 tab(s) orally once a day   folic acid 1 mg oral tablet: 1 tab(s) orally once a day  losartan-hydroCHLOROthiazide 100 mg-25 mg oral tablet: 1 tab(s) orally once a day  meclizine 25 mg oral tablet: 1 tab(s) orally 2 times a day  metFORMIN 500 mg oral tablet: 1 tab(s) orally once a day  Multiple Vitamins oral tablet: 1 tab(s) orally once a day  primidone 50 mg oral tablet: 50 milligram(s) orally once a day  renal ultrasound: b/l renal ultrasound  Dx: Renal cysts  senna (sennosides) 8.6 mg oral tablet: 2 tab(s) orally once a day (at bedtime)  thiamine 100 mg oral tablet: 1 tab(s) orally once a day  Tylenol 325 mg oral tablet: 2 tab(s) orally every 8 hours  Vitamin B1 50 mg oral tablet: 2 tab(s) orally once a day (in the morning)  Vitamin D3 25 mcg (1000 intl units) oral capsule: 1 cap(s) orally once a day   acetaminophen 325 mg oral tablet: 2 tab(s) orally every 6 hours As needed Temp greater or equal to 38C (100.4F), Mild Pain (1 - 3), Moderate Pain (4 - 6)  apixaban 5 mg oral tablet: 1 tab(s) orally 2 times a day  atorvastatin 10 mg oral tablet: 1 tab(s) orally once a day (at bedtime)  folic acid 1 mg oral tablet: 1 tab(s) orally once a day  losartan-hydroCHLOROthiazide 100 mg-25 mg oral tablet: 1 tab(s) orally once a day  melatonin 3 mg oral tablet: 1 tab(s) orally once a day (at bedtime) As needed Insomnia  metFORMIN 500 mg oral tablet: 1 tab(s) orally once a day  Multiple Vitamins oral tablet: 1 tab(s) orally once a day  primidone 50 mg oral tablet: 50 milligram(s) orally once a day  senna (sennosides) 8.6 mg oral tablet: 2 tab(s) orally once a day (at bedtime)  thiamine 100 mg oral tablet: 1 tab(s) orally once a day   apixaban 5 mg oral tablet: 1 tab(s) orally 2 times a day  atorvastatin 10 mg oral tablet: 1 tab(s) orally once a day (at bedtime)  folic acid 1 mg oral tablet: 1 tab(s) orally once a day  losartan-hydroCHLOROthiazide 100 mg-25 mg oral tablet: 1 tab(s) orally once a day  metFORMIN 500 mg oral tablet: 1 tab(s) orally once a day  Multiple Vitamins oral tablet: 1 tab(s) orally once a day  senna (sennosides) 8.6 mg oral tablet: 2 tab(s) orally once a day (at bedtime)  thiamine 100 mg oral tablet: 1 tab(s) orally once a day

## 2023-04-11 NOTE — PROGRESS NOTE ADULT - PROBLEM SELECTOR PLAN 5
on losartan 100mg daily  Monitor BP and titrate BP med

## 2023-04-11 NOTE — PROGRESS NOTE ADULT - PROBLEM SELECTOR PROBLEM 3
Moderate alcohol use disorder

## 2023-04-11 NOTE — PROGRESS NOTE ADULT - REASON FOR ADMISSION
fall, UTI, alcohol use disorder with possible withdrawal

## 2023-04-11 NOTE — PROGRESS NOTE ADULT - PROBLEM SELECTOR PLAN 6
on metformin 500mg daily at home  ISS and hypoglycemia protocol

## 2023-04-11 NOTE — PROGRESS NOTE ADULT - ASSESSMENT
Talked to daughter Shiva Wilson ( [phone]) over the phone  82 years old male HTN, HLD, Afib on apixaban, DM, alcohol use, dementia present to ED with fall. Patient was found on floor on his room, confused and noted tremors. Patient is a very poor historian. He reported chills for 1 day. He admit to alcohol use but cannot say exactly how much. No fever, urinary symptoms, cough, nausea, vomiting, diarrhea or abdominal pain. Per daughter, patient was recently discharged from NH on 3/16/23  Hypertensive, afebrile, sat well at RA. Appear to have chills. No leukocytosis, plt 260, K 4, Cr 1.06, glucose 144, lactate 2. UA appear dirty. CT head with no acute pathology, CT C spine with no fracture. CXR image reviewed, no focal consolidation.       Admitted with fall, UTI

## 2023-04-11 NOTE — PROGRESS NOTE ADULT - PROBLEM SELECTOR PLAN 4
on apixaban 5mg bid

## 2023-04-11 NOTE — PROGRESS NOTE ADULT - PROBLEM SELECTOR PROBLEM 6
Type 2 diabetes mellitus with unspecified complications

## 2023-04-11 NOTE — DISCHARGE NOTE NURSING/CASE MANAGEMENT/SOCIAL WORK - PATIENT PORTAL LINK FT
You can access the FollowMyHealth Patient Portal offered by [facility] by registering at the following website: [email or web address]. By joining [employer]’s FollowMyHealth portal, you will also be able to view your health information using other applications (apps) compatible with our system.

## 2023-04-11 NOTE — DISCHARGE NOTE PROVIDER - EXTENDED VTE YES NO FOR MLM ENOXAPARIN
Notified Milagro Marc [facility], Will need Dr. Tyler to enter new order for home oxygen, [facility] currently supplies and the order is .    ,

## 2023-04-11 NOTE — PROGRESS NOTE ADULT - PROBLEM SELECTOR PROBLEM 4
Unspecified atrial fibrillation

## 2023-04-17 DIAGNOSIS — R25.1 TREMOR, UNSPECIFIED: ICD-10-CM

## 2023-04-17 DIAGNOSIS — F10.230 ALCOHOL DEPENDENCE WITH WITHDRAWAL, UNCOMPLICATED: ICD-10-CM

## 2023-04-17 DIAGNOSIS — E11.9 TYPE 2 DIABETES MELLITUS WITHOUT COMPLICATIONS: ICD-10-CM

## 2023-04-17 DIAGNOSIS — R42 DIZZINESS AND GIDDINESS: ICD-10-CM

## 2023-04-17 DIAGNOSIS — F03.90 UNSPECIFIED DEMENTIA WITHOUT BEHAVIORAL DISTURBANCE: ICD-10-CM

## 2023-04-17 DIAGNOSIS — B96.20 UNSPECIFIED ESCHERICHIA COLI [E. COLI] AS THE CAUSE OF DISEASES CLASSIFIED ELSEWHERE: ICD-10-CM

## 2023-04-17 DIAGNOSIS — Z20.822 CONTACT WITH AND (SUSPECTED) EXPOSURE TO COVID-19: ICD-10-CM

## 2023-04-17 DIAGNOSIS — Z79.01 LONG TERM (CURRENT) USE OF ANTICOAGULANTS: ICD-10-CM

## 2023-04-17 DIAGNOSIS — N39.0 URINARY TRACT INFECTION, SITE NOT SPECIFIED: ICD-10-CM

## 2023-04-17 DIAGNOSIS — T45.516A UNDERDOSING OF ANTICOAGULANTS, INITIAL ENCOUNTER: ICD-10-CM

## 2023-04-17 DIAGNOSIS — R41.0 DISORIENTATION, UNSPECIFIED: ICD-10-CM

## 2023-04-17 DIAGNOSIS — I10 ESSENTIAL (PRIMARY) HYPERTENSION: ICD-10-CM

## 2023-04-17 DIAGNOSIS — Z79.84 LONG TERM (CURRENT) USE OF ORAL HYPOGLYCEMIC DRUGS: ICD-10-CM

## 2023-04-17 DIAGNOSIS — Z96.653 PRESENCE OF ARTIFICIAL KNEE JOINT, BILATERAL: ICD-10-CM

## 2023-04-17 DIAGNOSIS — E78.5 HYPERLIPIDEMIA, UNSPECIFIED: ICD-10-CM

## 2023-04-17 DIAGNOSIS — I48.91 UNSPECIFIED ATRIAL FIBRILLATION: ICD-10-CM

## 2023-04-17 DIAGNOSIS — R53.1 WEAKNESS: ICD-10-CM

## 2023-07-14 ENCOUNTER — EMERGENCY (EMERGENCY)
Facility: HOSPITAL | Age: 83
LOS: 0 days | Discharge: HOME | End: 2023-07-15
Attending: STUDENT IN AN ORGANIZED HEALTH CARE EDUCATION/TRAINING PROGRAM
Payer: MEDICARE

## 2023-07-14 VITALS
DIASTOLIC BLOOD PRESSURE: 93 MMHG | SYSTOLIC BLOOD PRESSURE: 164 MMHG | WEIGHT: 197.09 LBS | OXYGEN SATURATION: 96 % | HEIGHT: 68 IN | TEMPERATURE: 99 F | HEART RATE: 89 BPM | RESPIRATION RATE: 18 BRPM

## 2023-07-14 DIAGNOSIS — I10 ESSENTIAL (PRIMARY) HYPERTENSION: ICD-10-CM

## 2023-07-14 DIAGNOSIS — M54.50 LOW BACK PAIN, UNSPECIFIED: ICD-10-CM

## 2023-07-14 DIAGNOSIS — E11.9 TYPE 2 DIABETES MELLITUS WITHOUT COMPLICATIONS: ICD-10-CM

## 2023-07-14 DIAGNOSIS — Z79.84 LONG TERM (CURRENT) USE OF ORAL HYPOGLYCEMIC DRUGS: ICD-10-CM

## 2023-07-14 DIAGNOSIS — Z79.01 LONG TERM (CURRENT) USE OF ANTICOAGULANTS: ICD-10-CM

## 2023-07-14 PROCEDURE — 99284 EMERGENCY DEPT VISIT MOD MDM: CPT

## 2023-07-15 VITALS
HEART RATE: 70 BPM | TEMPERATURE: 98 F | OXYGEN SATURATION: 99 % | SYSTOLIC BLOOD PRESSURE: 115 MMHG | RESPIRATION RATE: 18 BRPM | DIASTOLIC BLOOD PRESSURE: 81 MMHG

## 2023-07-15 PROBLEM — F10.10 ALCOHOL ABUSE, UNCOMPLICATED: Chronic | Status: ACTIVE | Noted: 2023-04-05

## 2023-07-15 PROBLEM — I48.91 UNSPECIFIED ATRIAL FIBRILLATION: Chronic | Status: ACTIVE | Noted: 2023-04-05

## 2023-07-15 PROCEDURE — 72131 CT LUMBAR SPINE W/O DYE: CPT | Mod: 26,MC

## 2023-07-15 RX ORDER — LIDOCAINE 4 G/100G
1 CREAM TOPICAL ONCE
Refills: 0 | Status: COMPLETED | OUTPATIENT
Start: 2023-07-15 | End: 2023-07-15

## 2023-07-15 RX ORDER — METHOCARBAMOL 500 MG/1
750 TABLET, FILM COATED ORAL ONCE
Refills: 0 | Status: COMPLETED | OUTPATIENT
Start: 2023-07-15 | End: 2023-07-15

## 2023-07-15 RX ORDER — LIDOCAINE 4 G/100G
1 CREAM TOPICAL
Qty: 7 | Refills: 0
Start: 2023-07-15 | End: 2023-07-21

## 2023-07-15 RX ORDER — METHOCARBAMOL 500 MG/1
1 TABLET, FILM COATED ORAL
Qty: 15 | Refills: 0
Start: 2023-07-15 | End: 2023-07-19

## 2023-07-15 RX ORDER — IBUPROFEN 200 MG
1 TABLET ORAL
Qty: 30 | Refills: 0
Start: 2023-07-15 | End: 2023-07-19

## 2023-07-15 RX ORDER — IBUPROFEN 200 MG
600 TABLET ORAL ONCE
Refills: 0 | Status: COMPLETED | OUTPATIENT
Start: 2023-07-15 | End: 2023-07-15

## 2023-07-15 RX ADMIN — METHOCARBAMOL 750 MILLIGRAM(S): 500 TABLET, FILM COATED ORAL at 04:34

## 2023-07-15 RX ADMIN — Medication 600 MILLIGRAM(S): at 04:34

## 2023-07-15 RX ADMIN — LIDOCAINE 1 PATCH: 4 CREAM TOPICAL at 04:34

## 2023-07-15 NOTE — ED PROVIDER NOTE - CLINICAL SUMMARY MEDICAL DECISION MAKING FREE TEXT BOX
82M PMH HTN, DM pw 4 days of atraumatic low back pain. Afebrile, VSS. Well appearing, in NAD. No midline spinal TTP, no CVAT. Plan for CT, pain control. Re-eval. 82M PMH HTN, DM pw 4 days of atraumatic low back pain. Afebrile, VSS. Well appearing, in NAD. No midline spinal TTP, no CVAT. Plan for CT, pain control. Re-eval.  CT imaging w/o acute pathology. On re-eval, resting comfortably, in NAD. Pt reports improvement in symptoms s/p ED meds. Stable for d/c home. Given scripts for Motrin, Robaxin, Lidoderm patches. Instructed for close outpatient PCP f/u. Return signs / symptoms d/w pt. He understands / agrees w/ this plan.

## 2023-07-15 NOTE — ED PROVIDER NOTE - OBJECTIVE STATEMENT
82M PMH HTN, DM pw 4 days of atraumatic low back pain. Pain is equal R & L, non-radiating. Pt reports worse when getting up from laying, sitting, needed help to get out of bed this AM. Denies hx injury. No medications attempted PTA for symptomatic relief. Denies previous similar pain. ROS otherwise negative: Denies F/C, h/a, dizziness, CP, SOB, palpitations, abd pain, N/V/D/C, UTI sx, LE pain / swelling. Denies saddle paresthesia, bowel / bladder dysfunction, numbness / weakness / tingling in LE.     PMH as above, PSH BL knees, NKDA, meds as listed.

## 2023-07-15 NOTE — ED PROVIDER NOTE - PATIENT PORTAL LINK FT
You can access the FollowMyHealth Patient Portal offered by [facility] by registering at the following website: [email or web address]. By joining [employer]’s FollowMyHealth portal, you will also be able to view your health information using other applications (apps) compatible with our system.

## 2023-07-15 NOTE — ED ADULT NURSE NOTE - OBJECTIVE STATEMENT
covering for primary RN 82Male came in PMH HTN, DM pw 4 days of atraumatic low back pain. Pain is equal R & L, non-radiating. Pt reports worse when getting up from laying, sitting, needed help to get out of bed this AM. Denies hx injury. No medications attempted PTA for symptomatic relief. Denies previous similar pain.

## 2023-07-15 NOTE — ED ADULT NURSE NOTE - NSFALLUNIVINTERV_ED_ALL_ED
Bed/Stretcher in lowest position, wheels locked, appropriate side rails in place/Call bell, personal items and telephone in reach/Instruct patient to call for assistance before getting out of bed/chair/stretcher/Non-slip footwear applied when patient is off stretcher/[place] to call system/Physically safe environment - no spills, clutter or unnecessary equipment/Purposeful proactive rounding/Room/bathroom lighting operational, light cord in reach

## 2023-07-17 ENCOUNTER — INPATIENT (INPATIENT)
Facility: HOSPITAL | Age: 83
LOS: 10 days | Discharge: NURSING, REHAB OR LONG-TERM CARE | End: 2023-07-28
Attending: HOSPITALIST | Admitting: HOSPITALIST
Payer: MEDICARE

## 2023-07-17 VITALS
HEIGHT: 68 IN | RESPIRATION RATE: 17 BRPM | SYSTOLIC BLOOD PRESSURE: 129 MMHG | TEMPERATURE: 99 F | WEIGHT: 197.09 LBS | HEART RATE: 72 BPM | DIASTOLIC BLOOD PRESSURE: 79 MMHG | OXYGEN SATURATION: 98 %

## 2023-07-17 DIAGNOSIS — R53.1 WEAKNESS: ICD-10-CM

## 2023-07-17 DIAGNOSIS — F03.90 UNSPECIFIED DEMENTIA WITHOUT BEHAVIORAL DISTURBANCE: ICD-10-CM

## 2023-07-17 DIAGNOSIS — F10.20 ALCOHOL DEPENDENCE, UNCOMPLICATED: ICD-10-CM

## 2023-07-17 DIAGNOSIS — I48.91 UNSPECIFIED ATRIAL FIBRILLATION: ICD-10-CM

## 2023-07-17 DIAGNOSIS — N17.9 ACUTE KIDNEY FAILURE, UNSPECIFIED: ICD-10-CM

## 2023-07-17 DIAGNOSIS — E78.5 HYPERLIPIDEMIA, UNSPECIFIED: ICD-10-CM

## 2023-07-17 DIAGNOSIS — I10 ESSENTIAL (PRIMARY) HYPERTENSION: ICD-10-CM

## 2023-07-17 DIAGNOSIS — E11.8 TYPE 2 DIABETES MELLITUS WITH UNSPECIFIED COMPLICATIONS: ICD-10-CM

## 2023-07-17 LAB
ALBUMIN SERPL ELPH-MCNC: 3.3 G/DL — SIGNIFICANT CHANGE UP (ref 3.3–5)
ALP SERPL-CCNC: 92 U/L — SIGNIFICANT CHANGE UP (ref 40–120)
ALT FLD-CCNC: 25 U/L — SIGNIFICANT CHANGE UP (ref 12–78)
ANION GAP SERPL CALC-SCNC: 6 MMOL/L — SIGNIFICANT CHANGE UP (ref 5–17)
APPEARANCE UR: CLEAR — SIGNIFICANT CHANGE UP
APTT BLD: 31.5 SEC — SIGNIFICANT CHANGE UP (ref 27.5–35.5)
AST SERPL-CCNC: 21 U/L — SIGNIFICANT CHANGE UP (ref 15–37)
BACTERIA # UR AUTO: ABNORMAL
BASE EXCESS BLDV CALC-SCNC: 0.7 MMOL/L — SIGNIFICANT CHANGE UP (ref -2–3)
BASOPHILS # BLD AUTO: 0.03 K/UL — SIGNIFICANT CHANGE UP (ref 0–0.2)
BASOPHILS NFR BLD AUTO: 0.3 % — SIGNIFICANT CHANGE UP (ref 0–2)
BILIRUB SERPL-MCNC: 0.9 MG/DL — SIGNIFICANT CHANGE UP (ref 0.2–1.2)
BILIRUB UR-MCNC: NEGATIVE — SIGNIFICANT CHANGE UP
BLOOD GAS COMMENTS, VENOUS: SIGNIFICANT CHANGE UP
BUN SERPL-MCNC: 20 MG/DL — SIGNIFICANT CHANGE UP (ref 7–23)
CALCIUM SERPL-MCNC: 8.9 MG/DL — SIGNIFICANT CHANGE UP (ref 8.5–10.1)
CHLORIDE BLDV-SCNC: 109 MMOL/L — HIGH (ref 98–107)
CHLORIDE SERPL-SCNC: 109 MMOL/L — HIGH (ref 96–108)
CO2 BLDV-SCNC: 29 MMOL/L — HIGH (ref 22–26)
CO2 SERPL-SCNC: 26 MMOL/L — SIGNIFICANT CHANGE UP (ref 22–31)
COLOR SPEC: YELLOW — SIGNIFICANT CHANGE UP
CREAT SERPL-MCNC: 1.44 MG/DL — HIGH (ref 0.5–1.3)
DIFF PNL FLD: ABNORMAL
EGFR: 49 ML/MIN/1.73M2 — LOW
EOSINOPHIL # BLD AUTO: 0.19 K/UL — SIGNIFICANT CHANGE UP (ref 0–0.5)
EOSINOPHIL NFR BLD AUTO: 2.2 % — SIGNIFICANT CHANGE UP (ref 0–6)
EPI CELLS # UR: SIGNIFICANT CHANGE UP
ETHANOL SERPL-MCNC: <10 MG/DL — SIGNIFICANT CHANGE UP (ref 0–10)
FLUAV AG NPH QL: SIGNIFICANT CHANGE UP
FLUBV AG NPH QL: SIGNIFICANT CHANGE UP
GAS PNL BLDV: 137 MMOL/L — SIGNIFICANT CHANGE UP (ref 136–145)
GAS PNL BLDV: SIGNIFICANT CHANGE UP
GAS PNL BLDV: SIGNIFICANT CHANGE UP
GLUCOSE BLDC GLUCOMTR-MCNC: 108 MG/DL — HIGH (ref 70–99)
GLUCOSE BLDC GLUCOMTR-MCNC: 121 MG/DL — HIGH (ref 70–99)
GLUCOSE BLDC GLUCOMTR-MCNC: 156 MG/DL — HIGH (ref 70–99)
GLUCOSE BLDV-MCNC: 143 MG/DL — HIGH (ref 65–95)
GLUCOSE SERPL-MCNC: 139 MG/DL — HIGH (ref 70–99)
GLUCOSE UR QL: NEGATIVE MG/DL — SIGNIFICANT CHANGE UP
HCO3 BLDV-SCNC: 27 MMOL/L — SIGNIFICANT CHANGE UP (ref 22–28)
HCT VFR BLD CALC: 42.8 % — SIGNIFICANT CHANGE UP (ref 39–50)
HCT VFR BLDA CALC: 42 % — SIGNIFICANT CHANGE UP (ref 37–47)
HGB BLD CALC-MCNC: 14.1 G/DL — SIGNIFICANT CHANGE UP (ref 12.6–17.4)
HGB BLD-MCNC: 14.2 G/DL — SIGNIFICANT CHANGE UP (ref 13–17)
HOROWITZ INDEX BLDV+IHG-RTO: 21 — SIGNIFICANT CHANGE UP
IMM GRANULOCYTES NFR BLD AUTO: 0.3 % — SIGNIFICANT CHANGE UP (ref 0–0.9)
INR BLD: 1.26 RATIO — HIGH (ref 0.88–1.16)
KETONES UR-MCNC: ABNORMAL
LACTATE BLDV-MCNC: 1.2 MMOL/L — SIGNIFICANT CHANGE UP (ref 0.56–1.39)
LEUKOCYTE ESTERASE UR-ACNC: NEGATIVE — SIGNIFICANT CHANGE UP
LYMPHOCYTES # BLD AUTO: 2.02 K/UL — SIGNIFICANT CHANGE UP (ref 1–3.3)
LYMPHOCYTES # BLD AUTO: 23.5 % — SIGNIFICANT CHANGE UP (ref 13–44)
MAGNESIUM SERPL-MCNC: 2.1 MG/DL — SIGNIFICANT CHANGE UP (ref 1.6–2.6)
MCHC RBC-ENTMCNC: 33.2 G/DL — SIGNIFICANT CHANGE UP (ref 32–36)
MCHC RBC-ENTMCNC: 34 PG — SIGNIFICANT CHANGE UP (ref 27–34)
MCV RBC AUTO: 102.4 FL — HIGH (ref 80–100)
MONOCYTES # BLD AUTO: 0.64 K/UL — SIGNIFICANT CHANGE UP (ref 0–0.9)
MONOCYTES NFR BLD AUTO: 7.4 % — SIGNIFICANT CHANGE UP (ref 2–14)
NEUTROPHILS # BLD AUTO: 5.7 K/UL — SIGNIFICANT CHANGE UP (ref 1.8–7.4)
NEUTROPHILS NFR BLD AUTO: 66.3 % — SIGNIFICANT CHANGE UP (ref 43–77)
NITRITE UR-MCNC: NEGATIVE — SIGNIFICANT CHANGE UP
NRBC # BLD: 0 /100 WBCS — SIGNIFICANT CHANGE UP (ref 0–0)
PCO2 BLDV: 49 MMHG — SIGNIFICANT CHANGE UP (ref 42–55)
PH BLDV: 7.35 — SIGNIFICANT CHANGE UP (ref 7.32–7.43)
PH UR: 6 — SIGNIFICANT CHANGE UP (ref 5–8)
PHOSPHATE SERPL-MCNC: 2.7 MG/DL — SIGNIFICANT CHANGE UP (ref 2.5–4.5)
PLATELET # BLD AUTO: 201 K/UL — SIGNIFICANT CHANGE UP (ref 150–400)
PO2 BLDV: 44 MMHG — SIGNIFICANT CHANGE UP (ref 25–45)
POTASSIUM BLDV-SCNC: 4.2 MMOL/L — SIGNIFICANT CHANGE UP (ref 3.5–5.1)
POTASSIUM SERPL-MCNC: 3.8 MMOL/L — SIGNIFICANT CHANGE UP (ref 3.5–5.3)
POTASSIUM SERPL-SCNC: 3.8 MMOL/L — SIGNIFICANT CHANGE UP (ref 3.5–5.3)
PROT SERPL-MCNC: 7.9 GM/DL — SIGNIFICANT CHANGE UP (ref 6–8.3)
PROT UR-MCNC: 15 MG/DL
PROTHROM AB SERPL-ACNC: 15 SEC — HIGH (ref 10.5–13.4)
RBC # BLD: 4.18 M/UL — LOW (ref 4.2–5.8)
RBC # FLD: 13.3 % — SIGNIFICANT CHANGE UP (ref 10.3–14.5)
RBC CASTS # UR COMP ASSIST: SIGNIFICANT CHANGE UP /HPF (ref 0–4)
SAO2 % BLDV: 70.7 % — LOW (ref 94–98)
SARS-COV-2 RNA SPEC QL NAA+PROBE: SIGNIFICANT CHANGE UP
SODIUM SERPL-SCNC: 141 MMOL/L — SIGNIFICANT CHANGE UP (ref 135–145)
SP GR SPEC: 1.01 — SIGNIFICANT CHANGE UP (ref 1.01–1.02)
TROPONIN I, HIGH SENSITIVITY RESULT: 37 NG/L — SIGNIFICANT CHANGE UP
UROBILINOGEN FLD QL: NEGATIVE MG/DL — SIGNIFICANT CHANGE UP
WBC # BLD: 8.61 K/UL — SIGNIFICANT CHANGE UP (ref 3.8–10.5)
WBC # FLD AUTO: 8.61 K/UL — SIGNIFICANT CHANGE UP (ref 3.8–10.5)
WBC UR QL: SIGNIFICANT CHANGE UP

## 2023-07-17 PROCEDURE — 99285 EMERGENCY DEPT VISIT HI MDM: CPT

## 2023-07-17 PROCEDURE — 70450 CT HEAD/BRAIN W/O DYE: CPT | Mod: 26,MA

## 2023-07-17 PROCEDURE — 99222 1ST HOSP IP/OBS MODERATE 55: CPT

## 2023-07-17 PROCEDURE — 71250 CT THORAX DX C-: CPT | Mod: 26

## 2023-07-17 PROCEDURE — 71045 X-RAY EXAM CHEST 1 VIEW: CPT | Mod: 26

## 2023-07-17 PROCEDURE — 93010 ELECTROCARDIOGRAM REPORT: CPT

## 2023-07-17 RX ORDER — INSULIN LISPRO 100/ML
VIAL (ML) SUBCUTANEOUS
Refills: 0 | Status: DISCONTINUED | OUTPATIENT
Start: 2023-07-17 | End: 2023-07-28

## 2023-07-17 RX ORDER — FOLIC ACID 0.8 MG
1 TABLET ORAL DAILY
Refills: 0 | Status: DISCONTINUED | OUTPATIENT
Start: 2023-07-17 | End: 2023-07-28

## 2023-07-17 RX ORDER — INSULIN LISPRO 100/ML
VIAL (ML) SUBCUTANEOUS AT BEDTIME
Refills: 0 | Status: DISCONTINUED | OUTPATIENT
Start: 2023-07-17 | End: 2023-07-28

## 2023-07-17 RX ORDER — DEXTROSE 50 % IN WATER 50 %
12.5 SYRINGE (ML) INTRAVENOUS ONCE
Refills: 0 | Status: DISCONTINUED | OUTPATIENT
Start: 2023-07-17 | End: 2023-07-28

## 2023-07-17 RX ORDER — DEXTROSE 50 % IN WATER 50 %
25 SYRINGE (ML) INTRAVENOUS ONCE
Refills: 0 | Status: DISCONTINUED | OUTPATIENT
Start: 2023-07-17 | End: 2023-07-28

## 2023-07-17 RX ORDER — SODIUM CHLORIDE 9 MG/ML
1000 INJECTION INTRAMUSCULAR; INTRAVENOUS; SUBCUTANEOUS ONCE
Refills: 0 | Status: COMPLETED | OUTPATIENT
Start: 2023-07-17 | End: 2023-07-17

## 2023-07-17 RX ORDER — APIXABAN 2.5 MG/1
5 TABLET, FILM COATED ORAL
Refills: 0 | Status: DISCONTINUED | OUTPATIENT
Start: 2023-07-17 | End: 2023-07-28

## 2023-07-17 RX ORDER — SODIUM CHLORIDE 9 MG/ML
1000 INJECTION, SOLUTION INTRAVENOUS
Refills: 0 | Status: DISCONTINUED | OUTPATIENT
Start: 2023-07-17 | End: 2023-07-28

## 2023-07-17 RX ORDER — ATORVASTATIN CALCIUM 80 MG/1
10 TABLET, FILM COATED ORAL AT BEDTIME
Refills: 0 | Status: DISCONTINUED | OUTPATIENT
Start: 2023-07-17 | End: 2023-07-28

## 2023-07-17 RX ORDER — ACETAMINOPHEN 500 MG
650 TABLET ORAL EVERY 6 HOURS
Refills: 0 | Status: DISCONTINUED | OUTPATIENT
Start: 2023-07-17 | End: 2023-07-28

## 2023-07-17 RX ORDER — GLUCAGON INJECTION, SOLUTION 0.5 MG/.1ML
1 INJECTION, SOLUTION SUBCUTANEOUS ONCE
Refills: 0 | Status: DISCONTINUED | OUTPATIENT
Start: 2023-07-17 | End: 2023-07-28

## 2023-07-17 RX ORDER — THIAMINE MONONITRATE (VIT B1) 100 MG
100 TABLET ORAL DAILY
Refills: 0 | Status: DISCONTINUED | OUTPATIENT
Start: 2023-07-18 | End: 2023-07-28

## 2023-07-17 RX ORDER — THIAMINE MONONITRATE (VIT B1) 100 MG
100 TABLET ORAL ONCE
Refills: 0 | Status: COMPLETED | OUTPATIENT
Start: 2023-07-17 | End: 2023-07-17

## 2023-07-17 RX ORDER — DEXTROSE 50 % IN WATER 50 %
15 SYRINGE (ML) INTRAVENOUS ONCE
Refills: 0 | Status: DISCONTINUED | OUTPATIENT
Start: 2023-07-17 | End: 2023-07-28

## 2023-07-17 RX ORDER — LANOLIN ALCOHOL/MO/W.PET/CERES
3 CREAM (GRAM) TOPICAL AT BEDTIME
Refills: 0 | Status: DISCONTINUED | OUTPATIENT
Start: 2023-07-17 | End: 2023-07-28

## 2023-07-17 RX ORDER — SENNA PLUS 8.6 MG/1
2 TABLET ORAL AT BEDTIME
Refills: 0 | Status: DISCONTINUED | OUTPATIENT
Start: 2023-07-17 | End: 2023-07-28

## 2023-07-17 RX ADMIN — SODIUM CHLORIDE 100 MILLILITER(S): 9 INJECTION, SOLUTION INTRAVENOUS at 22:15

## 2023-07-17 RX ADMIN — SODIUM CHLORIDE 1000 MILLILITER(S): 9 INJECTION INTRAMUSCULAR; INTRAVENOUS; SUBCUTANEOUS at 05:12

## 2023-07-17 RX ADMIN — Medication 1 MILLIGRAM(S): at 11:05

## 2023-07-17 RX ADMIN — Medication 100 MILLIGRAM(S): at 11:05

## 2023-07-17 RX ADMIN — SODIUM CHLORIDE 100 MILLILITER(S): 9 INJECTION, SOLUTION INTRAVENOUS at 10:22

## 2023-07-17 RX ADMIN — Medication 1 TABLET(S): at 11:05

## 2023-07-17 RX ADMIN — APIXABAN 5 MILLIGRAM(S): 2.5 TABLET, FILM COATED ORAL at 18:34

## 2023-07-17 RX ADMIN — ATORVASTATIN CALCIUM 10 MILLIGRAM(S): 80 TABLET, FILM COATED ORAL at 22:12

## 2023-07-17 NOTE — PATIENT PROFILE ADULT - FUNCTIONAL ASSESSMENT - BASIC MOBILITY 6.
2-calculated by average/Not able to assess (calculate score using [facility] averaging method)

## 2023-07-17 NOTE — PHYSICAL THERAPY INITIAL EVALUATION ADULT - GAIT DEVIATIONS NOTED, PT EVAL
Patient : Sharmila Valente Age: 80 year old Sex: female   MRN: [id number] Encounter Date: 8/11/2021      History     Chief Complaint   Patient presents with   • Head Pain   • Fall   • Altered Mental Status     HPI   8/11/2021  8:21 PM Sharmila Valente is a 80 year old female who presents to the ED for evaluation of a syncopal episode and AMS that occurred 45 minutes PTA. According to the pt's daughter, the pt stood up from the kitchen and walked into the living room. She notes that the pt said \"woah\" and they heard a fall. The It was suspected that she hit her head on the nearby chest and fell onto the floor. The pt was awake when they got to her, but per the daughter, the pt asks what happened \"every five minutes\" and cant recall the events of the fall. The pt's family put an ice pack on the back of her head as EMS was called. The daughter notes that the pt is not typically forgetful. She noticed no slurred speech or facial droop. no numbness or focal weakness. No chest pain, shortness of breath or abdominal pain prior to or since. According to the pt, she has a HA, but denies CP, neck pain, back pain, abdominal pain, numbness, weakness, or any other associated symptoms. There are no further complaints or modifying factors at this time. History of syncope in the past and has seen EP. She states she has not been drinking enough water today and her urine was \"very concentrated\".    8:21 PM I reviewed the patient's medications, allergies, and past medical and surgical history in Epic. In DEC-2018, the pt had a positive tilt-table test result with electrophysiology. She also had an echo performed in JUL-2021 with an EF of 65%.     PCP: Farzaneh Marquez MD          Allergies   Allergen Reactions   • Atorvastatin Other (See Comments)     Cramps in legs   • Morphine Base VOMITING   • Oxycodone NAUSEA             Prior to Admission Medications    Details   escitalopram (LEXAPRO) 10 MG tablet TAKE 1 TABLET BY MOUTH DAILY  Qty: 90  tablet, Refills: 0      omeprazole (PrilOSEC) 20 MG capsule TAKE ONE CAPSULE BY MOUTH DAILY  Qty: 90 capsule, Refills: 1      omeprazole (PrilOSEC) 20 MG capsule TAKE ONE CAPSULE BY MOUTH DAILY  Qty: 90 capsule, Refills: 1      diphenhydrAMINE (SOMINEX) 25 MG tablet Take 25 mg by mouth daily.      fluticasone-vilanterol (Breo Ellipta) 100-25 MCG/INH inhaler Inhale 1 puff into the lungs daily.  Qty: 60 each, Refills: 11      fluticasone (FLONASE) 50 MCG/ACT nasal spray SHAKE LIQUID AND USE 2 SPRAYS IN EACH NOSTRIL DAILY  Qty: 48 g, Refills: 3      tiotropium (Spiriva Respimat) 1.25 MCG/ACT inhaler Inhale 2 puffs into the lungs daily.  Qty: 3 Inhaler, Refills: 3      albuterol 108 (90 Base) MCG/ACT inhaler Inhale 2 puffs into the lungs every 4 hours as needed for Shortness of Breath or Wheezing.  Qty: 1 each, Refills: 3      amLODIPine (NORVASC) 5 MG tablet Take 1 tablet by mouth daily.  Qty: 90 tablet, Refills: 3      Biotin 5000 MCG Tab Take by mouth daily.      diphenhydrAMINE HCl (BENADRYL ALLERGY PO) Take by mouth nightly.      albuterol-ipratropium 2.5 mg/0.5 mg (DUONEB) 0.5-2.5 (3) MG/3ML nebulizer solution Take 3 mLs by nebulization every 6 hours as needed for Wheezing or Shortness of Breath. J45.80 mild persistent asthma  Qty: 360 mL, Refills: 6      tiZANidine (ZANAFLEX) 2 MG tablet Take 1 tablet by mouth every 6 hours as needed for Muscle spasms.  Qty: 30 tablet, Refills: 0      ipratropium (ATROVENT) 0.02 % nebulizer solution Take 0.5 mg by nebulization as needed.       aspirin 81 MG chewable tablet Chew 1 tablet by mouth daily.  Qty: 180 tablet, Refills: 4      polyethylene glycol (MIRALAX) powder Take 17 g by mouth daily.  Qty: 255 g, Refills: 0      CALCIUM CARBONATE PO Take 600 mg by mouth daily.      FAM Unable to Find Medication once. OTC stool softner             Past Medical History:   Diagnosis Date   • Anemia    • Arthritis    • Esophageal reflux    • Hiatal hernia    • Other and unspecified  hyperlipidemia    • PONV (postoperative nausea and vomiting)    • RAD (reactive airway disease)        Past Surgical History:   Procedure Laterality Date   • APPENDECTOMY     • BLADDER SUSPENSION      dr Wallace CORONARY ANGIOPLASTY WITH STENT PLACEMENT  2016   • JOINT REPLACEMENT     • TOTAL KNEE REPLACEMENT Left      dr andrade        Family History   Problem Relation Age of Onset   • Heart disease Mother    • High cholesterol Mother    • Asthma Mother    • Arthritis Mother    • Stroke Father    • Psychiatric Father    • Kidney disease Sister    • Arthritis Sister    • Heart disease Maternal Grandmother    • Heart disease Maternal Grandfather    • Cancer Brother         leukemia       Social History     Tobacco Use   • Smoking status: Former Smoker     Packs/day: 0.50     Types: Cigarettes     Quit date: 1967     Years since quittin.6   • Smokeless tobacco: Never Used   Substance Use Topics   • Alcohol use: Yes     Alcohol/week: 2.0 standard drinks     Types: 1 Glasses of wine, 1 Standard drinks or equivalent per week     Comment: social   • Drug use: Never       E-cigarette/Vaping     E-Cigarette/Vaping Substances & Devices       Review of Systems   Constitutional: Negative for appetite change, chills and fever.   HENT: Negative for sore throat and trouble swallowing.    Eyes: Negative for visual disturbance.   Respiratory: Negative for cough, chest tightness and shortness of breath.    Cardiovascular: Negative for chest pain and leg swelling.   Gastrointestinal: Negative for abdominal pain, blood in stool, diarrhea, nausea and vomiting.   Genitourinary: Negative for dysuria, vaginal bleeding and vaginal discharge.   Musculoskeletal: Negative for gait problem and neck pain.   Skin: Negative for rash.   Neurological: Positive for headaches. Negative for dizziness, syncope and light-headedness.       Physical Exam     ED Triage Vitals   ED Triage Vitals Group      Temp 21 99.8 °F (37.7 °C)       Heart Rate 08/11/21 2003 62      Resp 08/11/21 2003 18      BP 08/11/21 2003 (!) 193/88      SpO2 08/11/21 2003 98 %      EtCO2 mmHg --       Height 08/11/21 2016 5' 3\" (1.6 m)      Weight 08/11/21 2016 200 lb 9.9 oz (91 kg)      Weight Scale Used 08/11/21 2016 Scale in bed      BMI (Calculated) 08/11/21 2016 35.54      IBW/kg (Calculated) 08/11/21 2016 52.4       Physical Exam  Vitals and nursing note reviewed.   Constitutional:       General: She is not in acute distress.     Appearance: She is well-developed. She is not diaphoretic.   HENT:      Head: Normocephalic.      Comments: 4cm laceration to the posterior scalp, hemostatic  Eyes:      Pupils: Pupils are equal, round, and reactive to light.   Cardiovascular:      Rate and Rhythm: Normal rate and regular rhythm.      Heart sounds: No murmur heard.     Pulmonary:      Effort: Pulmonary effort is normal. No respiratory distress.      Breath sounds: No wheezing.   Abdominal:      General: There is no distension.      Palpations: Abdomen is soft.      Tenderness: There is no abdominal tenderness. There is no guarding or rebound.   Musculoskeletal:         General: No tenderness. Normal range of motion.      Cervical back: Normal range of motion.   Skin:     General: Skin is warm and dry.      Findings: Laceration present. No erythema.      Comments: 4cm laceration to the posterior scalp.    Neurological:      Mental Status: She is alert. She is confused.      Cranial Nerves: No cranial nerve deficit.      Comments: Oriented to person, place, situation, and month, but not to date.   Mild confusion.  No finger to nose dysmetria.  No pronator drift.  Normal sensation throughout all extremities.   Normal strength throughout all extremities.   No aphasia, no dysarthria.   Visual fields intact to confrontation.          ED Course     Laceration Repair    Date/Time: 8/11/2021 11:22 PM  Performed by: Lydia Muller PA-C  Authorized by: Allie Gutiérrez MD      Consent:     Consent obtained:  Verbal    Consent given by:  Patient    Risks discussed:  Infection, pain, poor cosmetic result and poor wound healing  Anesthesia (see MAR for exact dosages):     Anesthesia method:  Local infiltration    Local anesthetic:  Lidocaine 1% WITH epi  Laceration details:     Location:  Scalp    Scalp location:  L parietal    Length (cm):  4  Repair type:     Repair type:  Simple  Pre-procedure details:     Preparation:  Patient was prepped and draped in usual sterile fashion  Treatment:     Area cleansed with:  Saline    Amount of cleaning:  Standard    Irrigation solution:  Sterile saline    Irrigation method:  Syringe  Skin repair:     Repair method:  Sutures    Suture size:  4-0    Suture material:  Prolene    Suture technique:  Simple interrupted    Number of sutures:  7  Approximation:     Approximation:  Close  Post-procedure details:     Dressing:  Antibiotic ointment    Patient tolerance of procedure:  Tolerated well, no immediate complications        Lab Results     Results for orders placed or performed during the hospital encounter of 08/11/21   Comprehensive Metabolic Panel   Result Value Ref Range    Fasting Status      Sodium 136 135 - 145 mmol/L    Potassium 4.5 3.4 - 5.1 mmol/L    Chloride 105 98 - 107 mmol/L    Carbon Dioxide 27 21 - 32 mmol/L    Anion Gap 9 (L) 10 - 20 mmol/L    Glucose 100 (H) 65 - 99 mg/dL    BUN 26 (H) 6 - 20 mg/dL    Creatinine 0.93 0.51 - 0.95 mg/dL    Glomerular Filtration Rate 58 (L) >90 mL/min/1.73m2    BUN/ Creatinine Ratio 28 (H) 7 - 25    Calcium 8.4 8.4 - 10.2 mg/dL    Bilirubin, Total 0.5 0.2 - 1.0 mg/dL    GOT/AST 35 <=37 Units/L    GPT/ALT 29 <64 Units/L    Alkaline Phosphatase 109 45 - 117 Units/L    Albumin 3.6 3.6 - 5.1 g/dL    Protein, Total 7.4 6.4 - 8.2 g/dL    Globulin 3.8 2.0 - 4.0 g/dL    A/G Ratio 0.9 (L) 1.0 - 2.4   Prothrombin Time   Result Value Ref Range    Prothrombin Time 9.8 9.7 - 11.8 sec    INR 0.9     Partial  Thromboplastin Time   Result Value Ref Range    PTT 26 22 - 30 sec   Urinalysis & Reflex Microscopy With Culture If Indicated   Result Value Ref Range    COLOR, URINALYSIS Yellow     APPEARANCE, URINALYSIS Clear     GLUCOSE, URINALYSIS Negative Negative mg/dL    BILIRUBIN, URINALYSIS Negative Negative    KETONES, URINALYSIS Negative Negative mg/dL    SPECIFIC GRAVITY, URINALYSIS 1.012 1.005 - 1.030    OCCULT BLOOD, URINALYSIS Negative Negative    PH, URINALYSIS 6.0 5.0 - 7.0    PROTEIN, URINALYSIS Negative Negative mg/dL    UROBILINOGEN, URINALYSIS 0.2 0.2, 1.0 mg/dL    NITRITE, URINALYSIS Negative Negative    LEUKOCYTE ESTERASE, URINALYSIS Trace (A) Negative    SQUAMOUS EPITHELIAL, URINALYSIS 1 to 5 None Seen, 1 to 5 /hpf    ERYTHROCYTES, URINALYSIS 1 to 2 None Seen, 1 to 2 /hpf    LEUKOCYTES, URINALYSIS 1 to 5 None Seen, 1 to 5 /hpf    BACTERIA, URINALYSIS None Seen None Seen /hpf    HYALINE CASTS, URINALYSIS None Seen None Seen, 1 to 5 /lpf    MUCUS Present    Thyroid Stimulating Hormone Reflex   Result Value Ref Range    TSH 4.323 0.350 - 5.000 mcUnits/mL   CBC with Automated Differential (performable only)   Result Value Ref Range    WBC 6.0 4.2 - 11.0 K/mcL    RBC 4.12 4.00 - 5.20 mil/mcL    HGB 12.4 12.0 - 15.5 g/dL    HCT 36.4 36.0 - 46.5 %    MCV 88.3 78.0 - 100.0 fl    MCH 30.1 26.0 - 34.0 pg    MCHC 34.1 32.0 - 36.5 g/dL    RDW-CV 14.0 11.0 - 15.0 %    RDW-SD 45.5 39.0 - 50.0 fL     140 - 450 K/mcL    NRBC 0 <=0 /100 WBC    Neutrophil, Percent 51 %    Lymphocytes, Percent 34 %    Mono, Percent 10 %    Eosinophils, Percent 4 %    Basophils, Percent 1 %    Immature Granulocytes 0 %    Absolute Neutrophils 3.1 1.8 - 7.7 K/mcL    Absolute Lymphocytes 2.0 1.0 - 4.0 K/mcL    Absolute Monocytes 0.6 0.3 - 0.9 K/mcL    Absolute Eosinophils  0.2 0.0 - 0.5 K/mcL    Absolute Basophils 0.1 0.0 - 0.3 K/mcL    Absolute Immmature Granulocytes 0.0 0.0 - 0.2 K/mcL   ISTAT8 VENOUS  POINT OF CARE   Result Value Ref  Range    BUN - POINT OF CARE 37 (H) 6 - 20 mg/dL    SODIUM - POINT OF CARE 136 135 - 145 mmol/L    POTASSIUM - POINT OF CARE 5.6 (H) 3.4 - 5.1 mmol/L    CHLORIDE - POINT OF CARE 99 98 - 107 mmol/L    TCO2 - POINT OF CARE 27 (H) 19 - 24 mmol/L    ANION GAP - POINT OF CARE 17 10 - 20 mmol/L    HEMATOCRIT - POINT OF CARE 40.0 36.0 - 46.5 %    HEMOGLOBIN - POINT OF CARE 13.6 12.0 - 15.5 g/dL    GLUCOSE - POINT OF CARE 99 70 - 99 mg/dL    CALCIUM, IONIZED - POINT OF CARE 1.16 1.15 - 1.29 mmol/L    Creatinine 1.00 (H) 0.51 - 0.95 mg/dL    Glomerular Filtration Rate 53 (L) >90 mL/min/1.73m2   TROPONIN I  POINT OF CARE   Result Value Ref Range    TROPONIN I - POINT OF CARE <0.10 <0.10 ng/mL       EKG Results     Results for orders placed or performed during the hospital encounter of 08/11/21   Electrocardiogram 12-Lead   Result Value Ref Range    Ventricular Rate EKG/Min (BPM) 65     Atrial Rate (BPM) 65     MA-Interval (MSEC) 202     QRS-Interval (MSEC) 78     QT-Interval (MSEC) 424     QTc 441     P Axis (Degrees) 65     R Axis (Degrees) -11     T Axis (Degrees) 41     REPORT TEXT       Normal sinus rhythm  Inferior infarct  (cited on or before  12-JAN-2017)  Possible  Anterolateral infarct  (cited on or before  12-JAN-2017)  Abnormal ECG  When compared with ECG of  24-JUN-2020 15:54,  No significant change was found  Confirmed by PERI LAZARO MD ([id number]),  Lore Garnett ([id number]) on 8/11/2021 9:27:57 PM          Radiology Results     Imaging Results          XR Chest AP or PA (Final result)  Result time 08/11/21 21:21:00    Final result                 Impression:    IMPRESSION:  *  Negative single-view chest.             Narrative:    EXAM: XR CHEST AP OR PA    HISTORY: fall, ams    COMPARISON: CT chest 5/7/2021    FINDINGS:    Normal cardiomediastinal silhouette. Dilated left atrium. Clear lungs.  Normal pulmonary vasculature. No visible pleural effusion or pneumothorax.  Osseous structures reveal no acute  abnormality. The upper abdomen and soft  tissues are unremarkable.                               CT HEAD WO CONTRAST (Final result)  Result time 08/11/21 21:17:31    Final result                 Impression:    IMPRESSION:      1.  No acute intracranial abnormality.  2.  Moderate cerebral atrophy with ventricular prominence, likely ex vacuo.  Recommend correlation for signs/symptoms of normal pressure hydrocephalus.  3.  Small posterior left scalp hematoma.    I, Attending Radiologist Scot Roldan MD, have reviewed the images and  report and concur with these findings interpreted by Resident Radiologist,  Alex Bland MD.              Narrative:    CT HEAD WO CONTRAST    CLINICAL INDICATION:  80 years-old Female with presenting history of Head  trauma, minor (Age >= 65y).    COMPARISON:  None available.    TECHNIQUE:  Routine noncontrast head CT spanning cranial vertex through  foramen magnum. Coronal and sagittal reformats were generated and reviewed.    FINDINGS:    No acute intracranial hemorrhage, mass effect or abnormal extra-axial fluid  collection.  The sulci and ventricles are moderately prominent.  Mild  patchy supratentorial white matter hypoattenuation is nonspecific but  typically ascribed to chronic microvascular ischemic changes in a patient  of this age.  No CT evidence of an acute territorial vascular insult,  however if there is concern for acute ischemia MRI follow-up could be  considered.  The imaged paranasal sinuses are clear.  The mastoid air cells  are clear.  The osseous structures are unremarkable.  Small subcutaneous  scalp hematoma over the posterior left scalp.                    Preliminary result                 Impression:        1.  No acute intracranial abnormality.  2.  Moderate cerebral atrophy.  3.  Small posterior left scalp hematoma.               Narrative:    CT HEAD WO CONTRAST    CLINICAL INDICATION:  80 years-old Female with presenting history of Head  trauma, minor  (Age >= 65y).    COMPARISON:  None available.    TECHNIQUE:  Routine noncontrast head CT spanning cranial vertex through  foramen magnum. Coronal and sagittal reformats were generated and   reviewed.    FINDINGS:    No acute intracranial hemorrhage, mass effect or abnormal extra-axial   fluid  collection.  The sulci and ventricles are moderately prominent.  Mild  patchy supratentorial white matter hypoattenuation is nonspecific but  typically ascribed to chronic microvascular ischemic changes in a patient  of this age.  No CT evidence of an acute territorial vascular insult,  however if there is concern for acute ischemia MRI follow-up could be  considered.  The imaged paranasal sinuses are clear.  The mastoid air   cells  are clear.  The osseous structures are unremarkable.  Small subcutaneous  scalp hematoma over the posterior left scalp.                                  ED Medication Orders (From admission, onward)    Ordered Start     Status Ordering Provider    08/11/21 2025 08/11/21 2026  lidocaine-EPINEPHrine 1 %-1:[id number] injection 1 mL  ONCE      Last MAR action: Given by Other PERI LAZARO    Vitals  Vitals:    08/11/21 2200 08/11/21 2230 08/11/21 2300 08/11/21 2330   BP: (!) 188/83 (!) 164/79 (!) 168/96 (!) 165/88   BP Location: LUE - Left upper extremity LUE - Left upper extremity LUE - Left upper extremity LUE - Left upper extremity   Patient Position: Semi-Lopes's Semi-Lopes's Semi-Lopes's Semi-Lopes's   Pulse: (!) 51 60 (!) 58 (!) 52   Resp: 20 (!) 21 20 20   Temp:       TempSrc:       SpO2: 96% 96% 96% 95%   Weight:       Height:           ED Course  Initial Impression:  Sharmila Valente is a 80 year old female who presents with AMS. Upon physical examination, the patient exhibits no neurologic deficits but is a little forgetful regarding the events of tonight. I discussed with the patient the plan to evaluate their symptoms with a Head CT, labs and UA. The patient understands  and agrees with the plan of care. All additional questions and concerns have been addressed at this time.     8:27 PM I updated the pt's daughter on the status of the pt. The daughter noted that if the pt does not drink water during the day, there is an increased chance that the pt will experience a syncopal episode and this is what has happened in the past. The daughter is unaware if the pt consumed water today. I indicated to the daughter that the pt has a reassuring neurological exam. I then explained to the daughter that the pt will have blood work and a urinalysis performed, and is currently having a Head CT performed and I will update the two of them once I have the results. The patient's daughter understands the plan of care and agrees to the plan. There are no further questions or concerns at this time.      10:03 PM I rechecked the patient who is resting comfortably. The pt states that her HA has not resided, but the confusion has. Pt remembers falling incident, but is unknown of the cause of the fall. Per the pt's daughter, she appears \"more like herself.\" I discussed the results of her CT scan which shows slightly enlarged ventricles but no bleeding. Discussed the mildly enlarged ventricles but the patient denies gait instability, urinary incontinence or other symptoms. Instructed to follow up with her primary care doctor regarding this finding.  Pt expresses desire for follow up care at home and does not want to stay in the hospital for monitoring. The pt's daughter notes that the pt will be cared for by family for the time being and that she will stay with her mother tonight. I then explained that I will consult with Dr. Cardona, Electrophysiology, to agree on a plan of care.     10:25 PM  Laceration anesthetized. Discussed plan for wound cleaning and repair of laceration with sutures.     10:38 PM I spoke with Dr. Ferro, Electrophysiology, regarding the patient's presentation and the ED work up. I  discussed the pt's desire for follow up care at home. She recommended that the pt contact Dr. Cardona for a follow up appointment. Dr. Ferro agrees with the plan.     11:32 PM   Laceration repaired by JAYJAY Muller PA-C. See procedure note for details. Discussed wound care instructions. Pt advised to follow up with PMD for suture removal in 10-14  days.     11:33 PM I rechecked the patient who continues to feel well and whos alternation in mental status has improved. Discussed likely etiology of fall (likely syncope as patient does not remember fall) but that a cardiac arrhyhtmia can not completely be ruled out. Again would prefer to return home and given her reassuring workup, history of prior positive tilt table testing I feel this is reasonable. Will have family support. Importance of follow up, increased hydration and follow up discussed. Also encouraged to change positions carefully and slowly.  Questions answered.       MDM  Patient with fall at home, likely syncopal/presyncopal based on history. Well appearing, normal neurologic exam. No cp, abd pain, sob, or other concerning symptoms. Exam nl. Given history, likely related to orthostasis. No cp or evidence of acs. No cp/sob/hypoxia or other symptoms to suggest PE. Mild ventricle enlargement but no symptoms of normal pressure hydrocephalus . Does not want further cardiac monitoring in the hospital. Has good family support at home. Mental status normal -- discussed possible mild concussion. Importance of follow up and strict return precautions discussed.    Critical Care time spent on this patient outside of billable procedures:  None    Clinical Impression  ED Diagnoses        Final diagnoses    Syncope, unspecified syncope type          Laceration of scalp without foreign body, initial encounter                Follow Up:  Farzaneh Marquez MD  [address]  [address]  [facility] [address]  [phone]    Call in 1 day  to schedule a follow up appointment  and suture removal in about 10-14 days          Summary of your Discharge Medications      You have not been prescribed any medications.         Pt is discharged to home/self care in stable condition.      I have reviewed the information recorded by the scribe for accuracy and agree with its contents.    ____________________________________________________________________    Emelia Garnett acting as a scribe for Dr. Allie Gutiérrez  Dictation # [id number]  Scribe: Emelia Gutiérrez MD  08/12/21 0132     decreased clemente/decreased step length/decreased stride length

## 2023-07-17 NOTE — PATIENT PROFILE ADULT - NSPROMEDSADMININFO_GEN_A_NUR
[de-identified] : Patient is doing well following their s/p R revision TKR from 2018. Their residual symptoms are muscular in nature over the vastus medialis. There is no sign of mechanical failure or infection. There may be a component from her lower back due to lower back pain. An x-ray of their spine in 2021 showed degenerative changes in the lower lumbar vertebrae. I reviewed x-rays with them. I have reassured them that their implants are functioning well\par \par I suggested she return to PT mostly for modality treatment, and I prescribed Diclofenac 75 BID for 2 weeks.\par \par I will see them back in 6-8 weeks with x-ray of right knee, lumbosacral spine, and AP pelvis.
no concerns

## 2023-07-17 NOTE — PHYSICAL THERAPY INITIAL EVALUATION ADULT - GAIT TRAINING, PT EVAL
Pt will independently ambulate 100 feet with no device without loss of balance, by 2 weeks. Pt will independently negotiate 5 steps with both rails to enter and exit home, by 2 weeks

## 2023-07-17 NOTE — ED ADULT NURSE REASSESSMENT NOTE - NS ED NURSE REASSESS COMMENT FT1
Upon entering pt room, noticed he was more lethargic than he had been previously. Able to arouse patient to voice with some effort. Notified Dr. Cervantes of change in mental status. Upon assessment, patient was arousable to pain. Pt went to stat head CT, now returned via stretcher.

## 2023-07-17 NOTE — H&P ADULT - PROBLEM SELECTOR PLAN 1
baseline 0.8 to 1, now Cr 1.44  Likely in the setting of dehydration  IV fluid  Closely monitor renal function  Hold ARB

## 2023-07-17 NOTE — H&P ADULT - PROBLEM SELECTOR PLAN 3
patient gave unreliable history of continue alcohol use. Admission on 04/2023 with possible alcohol withdrawal. Patient  reported last alcohol use was 1 years ago, later then 6 months, then yesterday  Check serum alcohol level  Thiamine, folic acid, multivitamin  CIWA protocol with symptoms trigger ativan  Monitor for DT

## 2023-07-17 NOTE — ED ADULT NURSE REASSESSMENT NOTE - NS ED NURSE REASSESS COMMENT FT1
Pt placed in monitor room, SPO2 and cardiac monitoring initiated. VSS at this time. Pt is cheerful and states he has no complaints at this time. Will continue to monitor, awaiting further orders

## 2023-07-17 NOTE — ED ADULT NURSE NOTE - OBJECTIVE STATEMENT
Pt is AOx4 c/o weakness. Pt got out of bed and felt weak, lowered himself to ground and could not get back up. Pt states that his daughter made him come here and he feels fine. Denies pain, dizziness, weakness. Of note, EMS reported that patient's house was very hot and he was diaphoretic, noted he improved once he cooled down

## 2023-07-17 NOTE — ED PROVIDER NOTE - PROGRESS NOTE DETAILS
Patient noted to have LOUIS. Not ambulating in ER with waxing and waning mental status. Possibly delirium vs. metabolic encephalopathy. Admitted to medicine for further management. UA Patient noted to have LOUIS. Not ambulating in ER with waxing and waning mental status. Possibly delirium vs. metabolic encephalopathy. Admitted to medicine for further management. UA pending-signed out to hospitalist.

## 2023-07-17 NOTE — PATIENT PROFILE ADULT - FUNCTIONAL ASSESSMENT - DAILY ACTIVITY 2.
Patient with one or more new problems requiring additional work-up/treatment. 2 = A lot of assistance

## 2023-07-17 NOTE — PATIENT PROFILE ADULT - FALL HARM RISK - HARM RISK INTERVENTIONS
Assistance with ambulation/Assistance OOB with selected safe patient handling equipment/Communicate Risk of Fall with Harm to all staff/Monitor for mental status changes/Monitor gait and stability/Reinforce activity limits and safety measures with patient and family/Tailored Fall Risk Interventions/Toileting schedule using arm’s reach rule for commode and bathroom/Use of alarms - bed, chair and/or voice tab/Visual Cue: Yellow wristband and red socks/Bed in lowest position, wheels locked, appropriate side rails in place/Call bell, personal items and telephone in reach/Instruct patient to call for assistance before getting out of bed or chair/Non-slip footwear when patient is out of bed/[place] to call system/Physically safe environment - no spills, clutter or unnecessary equipment/Purposeful Proactive Rounding/Room/bathroom lighting operational, light cord in reach

## 2023-07-17 NOTE — ED ADULT TRIAGE NOTE - CHIEF COMPLAINT QUOTE
lives by himself hx dementia , pt has a life alert, c/o feeling week and rolled himself to the floor , neighbors were at the house, unknown loc, on ELIQUIS .Fs 183 on the scene. House was very hot.

## 2023-07-17 NOTE — ED ADULT NURSE REASSESSMENT NOTE - NS ED NURSE REASSESS COMMENT FT1
Placed straight cath for residual urine. Pt immediately woke up and began speaking. Pt is now fully alert and talkative. Labs and urine sent per order. No acute distress at this time

## 2023-07-17 NOTE — H&P ADULT - NSHPPHYSICALEXAM_GEN_ALL_CORE
CONSTITUTIONAL: alert and cooperative, no acute distress.  EYES: PERRL, no scleral icterus  ENT: Mucosa moist, tongue normal  NECK: Neck supple, trachea midline, non-tender  CARDIAC: Normal S1 and S2. Regular rate and rhythms. No Pedal edema. Peripheral pulses intact  LUNGS: Equal air entry both lungs. No rales, rhonchi, wheezing. Normal respiratory effort.   ABDOMEN: Soft, nondistended, nontender. No guarding or rebound tenderness. No hepatomegaly or splenomegaly. Bowel sound normal.   MUSCULOSKELETAL: Normocephalic, atraumatic. No significant deformity or joint abnormality  NEUROLOGICAL: No gross motor or sensory deficits. Mild tremors +  SKIN: no lesions or eruptions. Normal turgor  PSYCHIATRIC: A&O x 2-3, appropriate mood and affect.

## 2023-07-17 NOTE — ED PROVIDER NOTE - CLINICAL SUMMARY MEDICAL DECISION MAKING FREE TEXT BOX
82 years old male HTN, HLD, Afib on apixaban, DM, alcohol use, Dementia presenting to ER presenting with weakness.  Likely in the setting of dehydration with waxing & waning mental status  Plan for IVF, screening labs including CK, CTH, UA/CXR  EKG rate controlled Afib  Ultimately found to have LOUIS. Admitted for further hydration and mental status monitoring.

## 2023-07-17 NOTE — PHYSICAL THERAPY INITIAL EVALUATION ADULT - ADDITIONAL COMMENTS
As per pt, lives in a private house with daughter and son-in-law. +5 steps to enter with both rails. Pt states to be independent in mobility, transfers and ambulation. States to own a rolling walker but does not use it.

## 2023-07-17 NOTE — ED ADULT NURSE REASSESSMENT NOTE - NS ED NURSE REASSESS COMMENT FT1
Received patient A/O x1, patient denies any pain or acute distress. Poor historian. Received patient A/O x1, patient denies any pain or acute distress. Poor historian. Pt has 20 g IV on right lower arm and left lower arm.

## 2023-07-17 NOTE — ED PROVIDER NOTE - OBJECTIVE STATEMENT
82 years old male HTN, HLD, Afib on apixaban, DM, alcohol use, Dementia presenting to ER presenting with weakness. 82 years old male HTN, HLD, Afib on apixaban, DM, alcohol use, Dementia presenting to ER presenting with weakness.    As per documentation, patient lives alone and has life-alert. Patient felt weak today and lowered himself to floor. As per triage note, his neighbors found him and noted that house was very hot. Patient with waxing and waning mental status in ER. Denies any pain- AAOx1 to self. Unknown baseline mental status. Patient states he can ambulate independently at home.

## 2023-07-17 NOTE — H&P ADULT - HISTORY OF PRESENT ILLNESS
Talked to daughter Shiva Wilson ( [phone]) over the phone  82 years old male HTN, HLD, Afib on apixaban, DM, alcohol use, dementia present to ED with complain of fall and generalized weakness. Patient is a poor historian. Patient live with daughter and daughter left home for 2 days. Per document, patient felt weak and lower himself to the floor. Neighbor found him and noted the house was very hot. Denied any pain. Questionable history of continue alcohol use  Hemodynamically stable, afebrile, sat well at RA. No leukocytosis, plt 201, K 3.8, Cr 1.44, glucose 139, hsTnT 37, , Flu/COVID negative. CXR with nonspecific interstitial markings. Nonspecific 2 small densities overlie lateral chest. CT head with no acute pathology    SH: questionable continue vodka use

## 2023-07-17 NOTE — PHYSICAL THERAPY INITIAL EVALUATION ADULT - PERTINENT HX OF CURRENT PROBLEM, REHAB EVAL
Pt is an 82-y/o, male, admitted to [facility] s/p fall at home; pt was found by neighbor. As per pt he got weak and lowered himself on the floor. Notes indicate questionable ETOH use. Pt admitted for LOUIS and acute encephalopathy. Pt now referred to PT for functional evaluation.

## 2023-07-17 NOTE — ED PROVIDER NOTE - PHYSICAL EXAMINATION
GENERAL: no acute distress; well-developed  HEAD:  Atraumatic, Normocephalic  EYES: EOMI, PERRLA,   ENT: MMM; oropharynx clear  NECK: Supple, No JVD  CHEST/LUNG: Clear to auscultation bilaterally; No wheeze  HEART: irregularly irregular No murmurs, rubs, or gallops  ABDOMEN: Soft, Nontender, Nondistended; Bowel sounds present  EXTREMITIES:  2+ Peripheral Pulses, No clubbing, cyanosis, or edema  PSYCH: AAOx1 to self  NEUROLOGY: no focal motor or sensory deficits. 5/5 muscle strength in all extremities.   SKIN: No rashes or lesions

## 2023-07-17 NOTE — H&P ADULT - ASSESSMENT
Talked to daughter Shiva Wilson ( [phone]) over the phone  82 years old male HTN, HLD, Afib on apixaban, DM, alcohol use, dementia present to ED with complain of fall and generalized weakness. Patient is a poor historian. Patient live with daughter and daughter left home for 2 days. Per document, patient felt weak and lower himself to the floor. Neighbor found him and noted the house was very hot. Denied any pain. Questionable history of continue alcohol use  Hemodynamically stable, afebrile, sat well at RA. No leukocytosis, plt 201, K 3.8, Cr 1.44, glucose 139, hsTnT 37, , Flu/COVID negative. CXR with nonspecific interstitial markings. Nonspecific 2 small densities overlie lateral chest. CT head with no acute pathology

## 2023-07-17 NOTE — ED ADULT NURSE NOTE - NSFALLUNIVINTERV_ED_ALL_ED
Bed/Stretcher in lowest position, wheels locked, appropriate side rails in place/Call bell, personal items and telephone in reach/Instruct patient to call for assistance before getting out of bed/chair/stretcher/Non-slip footwear applied when patient is off stretcher/[place] to call system/Physically safe environment - no spills, clutter or unnecessary equipment/Purposeful proactive rounding/Room/bathroom lighting operational, light cord in reach

## 2023-07-18 LAB
A1C WITH ESTIMATED AVERAGE GLUCOSE RESULT: 6.8 % — HIGH (ref 4–5.6)
ALBUMIN SERPL ELPH-MCNC: 2.9 G/DL — LOW (ref 3.3–5)
ALP SERPL-CCNC: 84 U/L — SIGNIFICANT CHANGE UP (ref 40–120)
ALT FLD-CCNC: 21 U/L — SIGNIFICANT CHANGE UP (ref 12–78)
ANION GAP SERPL CALC-SCNC: 4 MMOL/L — LOW (ref 5–17)
AST SERPL-CCNC: 16 U/L — SIGNIFICANT CHANGE UP (ref 15–37)
BILIRUB SERPL-MCNC: 0.6 MG/DL — SIGNIFICANT CHANGE UP (ref 0.2–1.2)
BUN SERPL-MCNC: 14 MG/DL — SIGNIFICANT CHANGE UP (ref 7–23)
CALCIUM SERPL-MCNC: 8.7 MG/DL — SIGNIFICANT CHANGE UP (ref 8.5–10.1)
CHLORIDE SERPL-SCNC: 111 MMOL/L — HIGH (ref 96–108)
CO2 SERPL-SCNC: 29 MMOL/L — SIGNIFICANT CHANGE UP (ref 22–31)
CREAT SERPL-MCNC: 1.02 MG/DL — SIGNIFICANT CHANGE UP (ref 0.5–1.3)
EGFR: 73 ML/MIN/1.73M2 — SIGNIFICANT CHANGE UP
ESTIMATED AVERAGE GLUCOSE: 148 MG/DL — HIGH (ref 68–114)
GLUCOSE BLDC GLUCOMTR-MCNC: 112 MG/DL — HIGH (ref 70–99)
GLUCOSE BLDC GLUCOMTR-MCNC: 119 MG/DL — HIGH (ref 70–99)
GLUCOSE BLDC GLUCOMTR-MCNC: 131 MG/DL — HIGH (ref 70–99)
GLUCOSE BLDC GLUCOMTR-MCNC: 154 MG/DL — HIGH (ref 70–99)
GLUCOSE SERPL-MCNC: 130 MG/DL — HIGH (ref 70–99)
HCT VFR BLD CALC: 40.2 % — SIGNIFICANT CHANGE UP (ref 39–50)
HGB BLD-MCNC: 13.7 G/DL — SIGNIFICANT CHANGE UP (ref 13–17)
MAGNESIUM SERPL-MCNC: 1.9 MG/DL — SIGNIFICANT CHANGE UP (ref 1.6–2.6)
MCHC RBC-ENTMCNC: 34.1 G/DL — SIGNIFICANT CHANGE UP (ref 32–36)
MCHC RBC-ENTMCNC: 35.2 PG — HIGH (ref 27–34)
MCV RBC AUTO: 103.3 FL — HIGH (ref 80–100)
NRBC # BLD: 0 /100 WBCS — SIGNIFICANT CHANGE UP (ref 0–0)
PHOSPHATE SERPL-MCNC: 3.1 MG/DL — SIGNIFICANT CHANGE UP (ref 2.5–4.5)
PLATELET # BLD AUTO: 211 K/UL — SIGNIFICANT CHANGE UP (ref 150–400)
POTASSIUM SERPL-MCNC: 3.7 MMOL/L — SIGNIFICANT CHANGE UP (ref 3.5–5.3)
POTASSIUM SERPL-SCNC: 3.7 MMOL/L — SIGNIFICANT CHANGE UP (ref 3.5–5.3)
PROT SERPL-MCNC: 7.3 GM/DL — SIGNIFICANT CHANGE UP (ref 6–8.3)
RBC # BLD: 3.89 M/UL — LOW (ref 4.2–5.8)
RBC # FLD: 13.1 % — SIGNIFICANT CHANGE UP (ref 10.3–14.5)
SODIUM SERPL-SCNC: 144 MMOL/L — SIGNIFICANT CHANGE UP (ref 135–145)
WBC # BLD: 6.35 K/UL — SIGNIFICANT CHANGE UP (ref 3.8–10.5)
WBC # FLD AUTO: 6.35 K/UL — SIGNIFICANT CHANGE UP (ref 3.8–10.5)

## 2023-07-18 PROCEDURE — 99232 SBSQ HOSP IP/OBS MODERATE 35: CPT

## 2023-07-18 RX ADMIN — APIXABAN 5 MILLIGRAM(S): 2.5 TABLET, FILM COATED ORAL at 05:04

## 2023-07-18 RX ADMIN — SODIUM CHLORIDE 100 MILLILITER(S): 9 INJECTION, SOLUTION INTRAVENOUS at 05:05

## 2023-07-18 RX ADMIN — ATORVASTATIN CALCIUM 10 MILLIGRAM(S): 80 TABLET, FILM COATED ORAL at 21:17

## 2023-07-18 RX ADMIN — Medication 100 MILLIGRAM(S): at 12:41

## 2023-07-18 RX ADMIN — Medication 3 MILLIGRAM(S): at 21:18

## 2023-07-18 RX ADMIN — APIXABAN 5 MILLIGRAM(S): 2.5 TABLET, FILM COATED ORAL at 17:55

## 2023-07-18 RX ADMIN — Medication 1 MILLIGRAM(S): at 12:40

## 2023-07-18 RX ADMIN — Medication 1 TABLET(S): at 12:41

## 2023-07-18 NOTE — PROGRESS NOTE ADULT - SUBJECTIVE AND OBJECTIVE BOX
Patient is a 82y old  Male who presents with a chief complaint of LOUIS, generalized weakness (17 Jul 2023 10:17)    INTERVAL HPI/OVERNIGHT EVENTS: no acute events   SUBJECTIVE: no fever/chills/dyusria/pain/discomfort     MEDICATIONS  (STANDING):  apixaban 5 milliGRAM(s) Oral two times a day  atorvastatin 10 milliGRAM(s) Oral at bedtime  dextrose 5%. 1000 milliLiter(s) (50 mL/Hr) IV Continuous <Continuous>  dextrose 5%. 1000 milliLiter(s) (100 mL/Hr) IV Continuous <Continuous>  dextrose 50% Injectable 25 Gram(s) IV Push once  dextrose 50% Injectable 12.5 Gram(s) IV Push once  dextrose 50% Injectable 25 Gram(s) IV Push once  folic acid 1 milliGRAM(s) Oral daily  glucagon  Injectable 1 milliGRAM(s) IntraMuscular once  insulin lispro (ADMELOG) corrective regimen sliding scale   SubCutaneous at bedtime  insulin lispro (ADMELOG) corrective regimen sliding scale   SubCutaneous three times a day before meals  lactated ringers. 1000 milliLiter(s) (100 mL/Hr) IV Continuous <Continuous>  multivitamin 1 Tablet(s) Oral daily  senna 2 Tablet(s) Oral at bedtime  thiamine 100 milliGRAM(s) Oral daily    MEDICATIONS  (PRN):  acetaminophen     Tablet .. 650 milliGRAM(s) Oral every 6 hours PRN Mild Pain (1 - 3), Moderate Pain (4 - 6)  dextrose Oral Gel 15 Gram(s) Oral once PRN Blood Glucose LESS THAN 70 milliGRAM(s)/deciliter  LORazepam   Injectable 2 milliGRAM(s) IV Push every 1 hour PRN Symptom-triggered: each CIWA -Ar score 8 or GREATER  melatonin 3 milliGRAM(s) Oral at bedtime PRN Insomnia    Allergies    No Known Allergies    Intolerances      REVIEW OF SYSTEMS:  All other systems reviewed and are negative    Vital Signs Last 24 Hrs  T(C): 36.2 (18 Jul 2023 17:55), Max: 36.7 (18 Jul 2023 12:34)  T(F): 97.2 (18 Jul 2023 17:55), Max: 98 (18 Jul 2023 12:34)  HR: 74 (18 Jul 2023 18:23) (62 - 74)  BP: 152/84 (18 Jul 2023 18:23) (121/68 - 160/72)  BP(mean): --  RR: 18 (18 Jul 2023 18:23) (17 - 18)  SpO2: 96% (18 Jul 2023 18:23) (96% - 99%)    Parameters below as of 18 Jul 2023 18:23  Patient On (Oxygen Delivery Method): room air      Daily     Daily   I&O's Summary    17 Jul 2023 07:01  -  18 Jul 2023 07:00  --------------------------------------------------------  IN: 0 mL / OUT: 250 mL / NET: -250 mL      CAPILLARY BLOOD GLUCOSE      POCT Blood Glucose.: 119 mg/dL (18 Jul 2023 16:27)  POCT Blood Glucose.: 154 mg/dL (18 Jul 2023 11:32)  POCT Blood Glucose.: 112 mg/dL (18 Jul 2023 08:03)  POCT Blood Glucose.: 156 mg/dL (17 Jul 2023 21:17)    PHYSICAL EXAM:  GENERAL: NAD, well-groomed, well-developed  NERVOUS SYSTEM:  Alert & Oriented X4   CHEST/LUNG: Clear to percussion bilaterally; No rales, rhonchi, wheezing, or rubs  HEART: Regular rate and rhythm; No murmurs, rubs, or gallops  ABDOMEN: Soft, Nontender, Distended, no rebound, Bowel sounds present  EXTREMITIES:  no edema, no cyanosis    Labs                          13.7   6.35  )-----------( 211      ( 18 Jul 2023 05:30 )             40.2     07-18    144  |  111<H>  |  14  ----------------------------<  130<H>  3.7   |  29  |  1.02    Ca    8.7      18 Jul 2023 05:30  Phos  3.1     07-18  Mg     1.9     07-18    TPro  7.3  /  Alb  2.9<L>  /  TBili  0.6  /  DBili  x   /  AST  16  /  ALT  21  /  AlkPhos  84  07-18    PT/INR - ( 17 Jul 2023 05:15 )   PT: 15.0 sec;   INR: 1.26 ratio         PTT - ( 17 Jul 2023 05:15 )  PTT:31.5 sec  CARDIAC MARKERS ( 17 Jul 2023 05:15 )  x     / x     / 247 U/L / x     / x          Urinalysis Basic - ( 18 Jul 2023 05:30 )    Color: x / Appearance: x / SG: x / pH: x  Gluc: 130 mg/dL / Ketone: x  / Bili: x / Urobili: x   Blood: x / Protein: x / Nitrite: x   Leuk Esterase: x / RBC: x / WBC x   Sq Epi: x / Non Sq Epi: x / Bacteria: x        Culture - Blood (collected 17 Jul 2023 06:00)  Source: .Blood Blood-Venous  Preliminary Report (18 Jul 2023 15:02):    No growth at 24 hours    Culture - Blood (collected 17 Jul 2023 05:45)  Source: .Blood Blood-Peripheral  Preliminary Report (18 Jul 2023 15:01):    No growth at 24 hours

## 2023-07-18 NOTE — PROGRESS NOTE ADULT - ASSESSMENT
83 yo male w/ pmhx of HTN, HLD, afib on eliquis, Diabetes, etoh use, dementia admitted to [facility] for mechanical fall    GENERAL MEDICINE  # mechanical fall  # dementia  # etoh abuse?  - CT chest- 7/17- emphysema, right sided calcified granuloma  - CT brain- 7/17- no fx, no bleed  - CT cervical- 7/17- no fx  - felt weak and laid himself on the floor     NEPHROLOGY  # chantale  - creatinine 1.44->1.01, baseline ~ 1   - likely secondary to losartan + prerenal/dehydration  - trend daily    CARDIOLOGY  # HTN  # HLD  # afib on eliquis   - eliquis 5 mg po bid, lipitor 10 mg po daily     Discharge planning: daughter does not want her father to go back home. not safe discharge. she is requesting rehab placement. No acute medical problems. medically stable for discharge     PLAN  - c/w GMF  - monitor overnight  - consider restarting losartan in the am if creatinine still stable and sbp increasing   - discharge planning the am      83 yo male w/ pmhx of HTN, HLD, afib on eliquis, Diabetes, etoh use, dementia admitted to [facility] for mechanical fall    GENERAL MEDICINE  # mechanical fall  # dementia  # etoh abuse?  - CT chest- 7/17- emphysema, right sided calcified granuloma  - CT brain- 7/17- no fx, no bleed  - CT cervical- 7/17- no fx  - felt weak and laid himself on the floor     NEPHROLOGY  # chantale  - creatinine 1.44->1.01, baseline ~ 1   - likely secondary to losartan + prerenal/dehydration  - trend daily    ENDOCRINOLOGY  # diabetes  - RISS    CARDIOLOGY  # HTN  # HLD  # afib on eliquis   - eliquis 5 mg po bid, lipitor 10 mg po daily     Discharge planning: daughter does not want her father to go back home. not safe discharge. she is requesting rehab placement. No acute medical problems. medically stable for discharge     PLAN  - c/w GMF  - monitor overnight  - consider restarting losartan in the am if creatinine still stable and sbp increasing   - discharge planning the am

## 2023-07-19 LAB
ALBUMIN SERPL ELPH-MCNC: 3.2 G/DL — LOW (ref 3.3–5)
ALP SERPL-CCNC: 84 U/L — SIGNIFICANT CHANGE UP (ref 40–120)
ALT FLD-CCNC: 21 U/L — SIGNIFICANT CHANGE UP (ref 12–78)
ANION GAP SERPL CALC-SCNC: 5 MMOL/L — SIGNIFICANT CHANGE UP (ref 5–17)
AST SERPL-CCNC: 17 U/L — SIGNIFICANT CHANGE UP (ref 15–37)
BILIRUB SERPL-MCNC: 0.5 MG/DL — SIGNIFICANT CHANGE UP (ref 0.2–1.2)
BUN SERPL-MCNC: 14 MG/DL — SIGNIFICANT CHANGE UP (ref 7–23)
CALCIUM SERPL-MCNC: 8.5 MG/DL — SIGNIFICANT CHANGE UP (ref 8.5–10.1)
CHLORIDE SERPL-SCNC: 109 MMOL/L — HIGH (ref 96–108)
CO2 SERPL-SCNC: 28 MMOL/L — SIGNIFICANT CHANGE UP (ref 22–31)
CREAT SERPL-MCNC: 0.99 MG/DL — SIGNIFICANT CHANGE UP (ref 0.5–1.3)
EGFR: 76 ML/MIN/1.73M2 — SIGNIFICANT CHANGE UP
GLUCOSE BLDC GLUCOMTR-MCNC: 110 MG/DL — HIGH (ref 70–99)
GLUCOSE BLDC GLUCOMTR-MCNC: 114 MG/DL — HIGH (ref 70–99)
GLUCOSE BLDC GLUCOMTR-MCNC: 120 MG/DL — HIGH (ref 70–99)
GLUCOSE BLDC GLUCOMTR-MCNC: 169 MG/DL — HIGH (ref 70–99)
GLUCOSE SERPL-MCNC: 129 MG/DL — HIGH (ref 70–99)
HCT VFR BLD CALC: 39.2 % — SIGNIFICANT CHANGE UP (ref 39–50)
HGB BLD-MCNC: 13.3 G/DL — SIGNIFICANT CHANGE UP (ref 13–17)
MAGNESIUM SERPL-MCNC: 1.7 MG/DL — SIGNIFICANT CHANGE UP (ref 1.6–2.6)
MCHC RBC-ENTMCNC: 33.9 G/DL — SIGNIFICANT CHANGE UP (ref 32–36)
MCHC RBC-ENTMCNC: 34.5 PG — HIGH (ref 27–34)
MCV RBC AUTO: 101.8 FL — HIGH (ref 80–100)
NRBC # BLD: 0 /100 WBCS — SIGNIFICANT CHANGE UP (ref 0–0)
PHOSPHATE SERPL-MCNC: 3.3 MG/DL — SIGNIFICANT CHANGE UP (ref 2.5–4.5)
PLATELET # BLD AUTO: 233 K/UL — SIGNIFICANT CHANGE UP (ref 150–400)
POTASSIUM SERPL-MCNC: 3.2 MMOL/L — LOW (ref 3.5–5.3)
POTASSIUM SERPL-SCNC: 3.2 MMOL/L — LOW (ref 3.5–5.3)
PROCALCITONIN SERPL-MCNC: 0.04 NG/ML — SIGNIFICANT CHANGE UP (ref 0.02–0.1)
PROT SERPL-MCNC: 7.3 GM/DL — SIGNIFICANT CHANGE UP (ref 6–8.3)
RBC # BLD: 3.85 M/UL — LOW (ref 4.2–5.8)
RBC # FLD: 12.7 % — SIGNIFICANT CHANGE UP (ref 10.3–14.5)
SODIUM SERPL-SCNC: 142 MMOL/L — SIGNIFICANT CHANGE UP (ref 135–145)
WBC # BLD: 5.43 K/UL — SIGNIFICANT CHANGE UP (ref 3.8–10.5)
WBC # FLD AUTO: 5.43 K/UL — SIGNIFICANT CHANGE UP (ref 3.8–10.5)

## 2023-07-19 PROCEDURE — 99232 SBSQ HOSP IP/OBS MODERATE 35: CPT

## 2023-07-19 RX ORDER — LOSARTAN POTASSIUM 100 MG/1
100 TABLET, FILM COATED ORAL DAILY
Refills: 0 | Status: DISCONTINUED | OUTPATIENT
Start: 2023-07-19 | End: 2023-07-28

## 2023-07-19 RX ORDER — POTASSIUM CHLORIDE 20 MEQ
40 PACKET (EA) ORAL EVERY 4 HOURS
Refills: 0 | Status: COMPLETED | OUTPATIENT
Start: 2023-07-19 | End: 2023-07-19

## 2023-07-19 RX ADMIN — Medication 40 MILLIEQUIVALENT(S): at 21:32

## 2023-07-19 RX ADMIN — SENNA PLUS 2 TABLET(S): 8.6 TABLET ORAL at 05:29

## 2023-07-19 RX ADMIN — Medication 1: at 12:02

## 2023-07-19 RX ADMIN — Medication 40 MILLIEQUIVALENT(S): at 12:01

## 2023-07-19 RX ADMIN — Medication 40 MILLIEQUIVALENT(S): at 17:09

## 2023-07-19 RX ADMIN — Medication 1 MILLIGRAM(S): at 12:01

## 2023-07-19 RX ADMIN — ATORVASTATIN CALCIUM 10 MILLIGRAM(S): 80 TABLET, FILM COATED ORAL at 21:31

## 2023-07-19 RX ADMIN — Medication 1 TABLET(S): at 12:01

## 2023-07-19 RX ADMIN — Medication 100 MILLIGRAM(S): at 12:01

## 2023-07-19 RX ADMIN — APIXABAN 5 MILLIGRAM(S): 2.5 TABLET, FILM COATED ORAL at 05:35

## 2023-07-19 RX ADMIN — APIXABAN 5 MILLIGRAM(S): 2.5 TABLET, FILM COATED ORAL at 17:09

## 2023-07-19 NOTE — PROGRESS NOTE ADULT - ASSESSMENT
81 yo male w/ history of HTN, HLD, A. fib on Eliquis, DM II, Etoh use & dementia admitted to [facility] for mechanical fall.     Mechanical fall  - CT brain- 7/17- no fx, no bleed  - PT recommended Home PT     Hx of etoh abuse  - c/w Thiamine, folic acid, multivitamin  - watch for withdrawal     LOUIS  - resolved  - likely prerenal     Hypokalemia  - replace w/ KCl    DM II  - c/w ISS  - Hgb A1C is 6.8    HTN  - resume losartan     A. fib  - c/w Eliquis     HLD  - c/w Lipitor      Constipation  - c/w Senna    Prophylaxis:  DVT: Eliquis  GI: PO diet    Discharge planning: daughter does not want her father to go back home. not safe discharge. she is requesting rehab placement. No acute medical problems. medically stable for discharge       83 yo male w/ history of HTN, HLD, A. fib on Eliquis, DM II, Etoh use & dementia admitted to [facility] for mechanical fall.     Mechanical fall  - CT brain- 7/17- no fx, no bleed  - PT recommended Home PT     Hx of etoh abuse  - c/w Thiamine, folic acid, multivitamin  - watch for withdrawal     LOUIS  - resolved  - likely prerenal     Hypokalemia  - replace w/ KCl    DM II  - c/w ISS  - Hgb A1C is 6.8    HTN  - resume losartan & HCTZ    A. fib  - c/w Eliquis     HLD  - c/w Lipitor      Constipation  - c/w Senna    Prophylaxis:  DVT: Eliquis  GI: PO diet    Discharge planning: daughter does not want her father to go back home. not safe discharge. she is requesting rehab placement. No acute medical problems. medically stable for discharge

## 2023-07-19 NOTE — PROGRESS NOTE ADULT - SUBJECTIVE AND OBJECTIVE BOX
Patient is a 82y old  Male who presents with a chief complaint of LOUIS, generalized weakness (18 Jul 2023 20:28)      INTERVAL HPI/OVERNIGHT EVENTS:    MEDICATIONS  (STANDING):  apixaban 5 milliGRAM(s) Oral two times a day  atorvastatin 10 milliGRAM(s) Oral at bedtime  dextrose 5%. 1000 milliLiter(s) (100 mL/Hr) IV Continuous <Continuous>  dextrose 5%. 1000 milliLiter(s) (50 mL/Hr) IV Continuous <Continuous>  dextrose 50% Injectable 25 Gram(s) IV Push once  dextrose 50% Injectable 25 Gram(s) IV Push once  dextrose 50% Injectable 12.5 Gram(s) IV Push once  folic acid 1 milliGRAM(s) Oral daily  glucagon  Injectable 1 milliGRAM(s) IntraMuscular once  insulin lispro (ADMELOG) corrective regimen sliding scale   SubCutaneous at bedtime  insulin lispro (ADMELOG) corrective regimen sliding scale   SubCutaneous three times a day before meals  lactated ringers. 1000 milliLiter(s) (100 mL/Hr) IV Continuous <Continuous>  multivitamin 1 Tablet(s) Oral daily  senna 2 Tablet(s) Oral at bedtime  thiamine 100 milliGRAM(s) Oral daily    MEDICATIONS  (PRN):  acetaminophen     Tablet .. 650 milliGRAM(s) Oral every 6 hours PRN Mild Pain (1 - 3), Moderate Pain (4 - 6)  dextrose Oral Gel 15 Gram(s) Oral once PRN Blood Glucose LESS THAN 70 milliGRAM(s)/deciliter  LORazepam   Injectable 2 milliGRAM(s) IV Push every 1 hour PRN Symptom-triggered: each CIWA -Ar score 8 or GREATER  melatonin 3 milliGRAM(s) Oral at bedtime PRN Insomnia      Allergies    No Known Allergies    Intolerances        Vital Signs Last 24 Hrs  T(C): 36.8 (19 Jul 2023 17:15), Max: 36.8 (19 Jul 2023 17:15)  T(F): 98.3 (19 Jul 2023 17:15), Max: 98.3 (19 Jul 2023 17:15)  HR: 73 (19 Jul 2023 17:15) (62 - 78)  BP: 148/90 (19 Jul 2023 17:15) (132/87 - 154/90)  BP(mean): --  RR: 18 (19 Jul 2023 17:15) (18 - 18)  SpO2: 100% (19 Jul 2023 17:15) (97% - 100%)    Parameters below as of 19 Jul 2023 17:15  Patient On (Oxygen Delivery Method): room air        PHYSICAL EXAM:  GENERAL: NAD, well-groomed, well-developed  HEAD:  Atraumatic, Normocephalic  EYES: EOMI, PERRLA, conjunctiva and sclera clear  ENMT: No tonsillar erythema, exudates, or enlargement; Moist mucous membranes, Good dentition, No lesions  NECK: Supple, No JVD, Normal thyroid  NERVOUS SYSTEM:  Alert & Oriented X3, Good concentration; Motor Strength 5/5 B/L upper and lower extremities; DTRs 2+ intact and symmetric  CHEST/LUNG: Clear to auscultation bilaterally; No rales, rhonchi, wheezing, or rubs  HEART: Regular rate and rhythm; No murmurs, rubs, or gallops  ABDOMEN: Soft, Nontender, Nondistended; Bowel sounds present  EXTREMITIES:  2+ Peripheral Pulses, No clubbing, cyanosis, or edema  LYMPH: No lymphadenopathy noted  SKIN: No rashes or lesions    LABS:                        13.3   5.43  )-----------( 233      ( 19 Jul 2023 06:06 )             39.2     07-19    142  |  109<H>  |  14  ----------------------------<  129<H>  3.2<L>   |  28  |  0.99    Ca    8.5      19 Jul 2023 06:06  Phos  3.3     07-19  Mg     1.7     07-19    TPro  7.3  /  Alb  3.2<L>  /  TBili  0.5  /  DBili  x   /  AST  17  /  ALT  21  /  AlkPhos  84  07-19      Urinalysis Basic - ( 19 Jul 2023 06:06 )    Color: x / Appearance: x / SG: x / pH: x  Gluc: 129 mg/dL / Ketone: x  / Bili: x / Urobili: x   Blood: x / Protein: x / Nitrite: x   Leuk Esterase: x / RBC: x / WBC x   Sq Epi: x / Non Sq Epi: x / Bacteria: x      CAPILLARY BLOOD GLUCOSE      POCT Blood Glucose.: 110 mg/dL (19 Jul 2023 16:09)  POCT Blood Glucose.: 169 mg/dL (19 Jul 2023 11:33)  POCT Blood Glucose.: 114 mg/dL (19 Jul 2023 08:05)      Culture - Blood (collected 17 Jul 2023 06:00)  Source: .Blood Blood-Venous  Preliminary Report (19 Jul 2023 15:08):    No growth at 48 Hours    Culture - Blood (collected 17 Jul 2023 05:45)  Source: .Blood Blood-Peripheral  Preliminary Report (19 Jul 2023 15:08):    No growth at 48 Hours      RADIOLOGY & ADDITIONAL TESTS:    07-18-23 @ 07:01  -  07-19-23 @ 07:00  --------------------------------------------------------  IN:  Total IN: 0 mL    OUT:    Voided (mL): 400 mL  Total OUT: 400 mL    Total NET: -400 mL       81 yo male w/ history of HTN, HLD, A. fib on Eliquis, DM II, Etoh use & dementia admitted to [facility] for mechanical fall. He is lying in bed in NAD.    MEDICATIONS  (STANDING):  apixaban 5 milliGRAM(s) Oral two times a day  atorvastatin 10 milliGRAM(s) Oral at bedtime  dextrose 5%. 1000 milliLiter(s) (100 mL/Hr) IV Continuous <Continuous>  dextrose 5%. 1000 milliLiter(s) (50 mL/Hr) IV Continuous <Continuous>  dextrose 50% Injectable 25 Gram(s) IV Push once  dextrose 50% Injectable 25 Gram(s) IV Push once  dextrose 50% Injectable 12.5 Gram(s) IV Push once  folic acid 1 milliGRAM(s) Oral daily  glucagon  Injectable 1 milliGRAM(s) IntraMuscular once  insulin lispro (ADMELOG) corrective regimen sliding scale   SubCutaneous at bedtime  insulin lispro (ADMELOG) corrective regimen sliding scale   SubCutaneous three times a day before meals  lactated ringers. 1000 milliLiter(s) (100 mL/Hr) IV Continuous <Continuous>  multivitamin 1 Tablet(s) Oral daily  senna 2 Tablet(s) Oral at bedtime  thiamine 100 milliGRAM(s) Oral daily    MEDICATIONS  (PRN):  acetaminophen     Tablet .. 650 milliGRAM(s) Oral every 6 hours PRN Mild Pain (1 - 3), Moderate Pain (4 - 6)  dextrose Oral Gel 15 Gram(s) Oral once PRN Blood Glucose LESS THAN 70 milliGRAM(s)/deciliter  LORazepam   Injectable 2 milliGRAM(s) IV Push every 1 hour PRN Symptom-triggered: each CIWA -Ar score 8 or GREATER  melatonin 3 milliGRAM(s) Oral at bedtime PRN Insomnia      Allergies    No Known Allergies    Intolerances        Vital Signs Last 24 Hrs  T(C): 36.8 (19 Jul 2023 17:15), Max: 36.8 (19 Jul 2023 17:15)  T(F): 98.3 (19 Jul 2023 17:15), Max: 98.3 (19 Jul 2023 17:15)  HR: 73 (19 Jul 2023 17:15) (62 - 78)  BP: 148/90 (19 Jul 2023 17:15) (132/87 - 154/90)  BP(mean): --  RR: 18 (19 Jul 2023 17:15) (18 - 18)  SpO2: 100% (19 Jul 2023 17:15) (97% - 100%)    Parameters below as of 19 Jul 2023 17:15  Patient On (Oxygen Delivery Method): room air        PHYSICAL EXAM:  GENERAL: NAD   HEAD:  Atraumatic, Normocephalic  EYES: EOMI, PERRLA   NECK: Supple   NERVOUS SYSTEM:  Alert    CHEST/LUNG: Clear to auscultation bilaterally; No rales, rhonchi, wheezing, or rubs  HEART: Regular rate and rhythm; No murmurs, rubs, or gallops  ABDOMEN: Soft, Nontender, Nondistended; Bowel sounds present  EXTREMITIES:  No clubbing, cyanosis, or edema       LABS:                        13.3   5.43  )-----------( 233      ( 19 Jul 2023 06:06 )             39.2     07-19    142  |  109<H>  |  14  ----------------------------<  129<H>  3.2<L>   |  28  |  0.99    Ca    8.5      19 Jul 2023 06:06  Phos  3.3     07-19  Mg     1.7     07-19    TPro  7.3  /  Alb  3.2<L>  /  TBili  0.5  /  DBili  x   /  AST  17  /  ALT  21  /  AlkPhos  84  07-19      Urinalysis Basic - ( 19 Jul 2023 06:06 )    Color: x / Appearance: x / SG: x / pH: x  Gluc: 129 mg/dL / Ketone: x  / Bili: x / Urobili: x   Blood: x / Protein: x / Nitrite: x   Leuk Esterase: x / RBC: x / WBC x   Sq Epi: x / Non Sq Epi: x / Bacteria: x      CAPILLARY BLOOD GLUCOSE      POCT Blood Glucose.: 110 mg/dL (19 Jul 2023 16:09)  POCT Blood Glucose.: 169 mg/dL (19 Jul 2023 11:33)  POCT Blood Glucose.: 114 mg/dL (19 Jul 2023 08:05)      Culture - Blood (collected 17 Jul 2023 06:00)  Source: .Blood Blood-Venous  Preliminary Report (19 Jul 2023 15:08):    No growth at 48 Hours    Culture - Blood (collected 17 Jul 2023 05:45)  Source: .Blood Blood-Peripheral  Preliminary Report (19 Jul 2023 15:08):    No growth at 48 Hours      RADIOLOGY & ADDITIONAL TESTS:    07-18-23 @ 07:01  -  07-19-23 @ 07:00  --------------------------------------------------------  IN:  Total IN: 0 mL    OUT:    Voided (mL): 400 mL  Total OUT: 400 mL    Total NET: -400 mL

## 2023-07-20 LAB
ANION GAP SERPL CALC-SCNC: 7 MMOL/L — SIGNIFICANT CHANGE UP (ref 5–17)
BUN SERPL-MCNC: 15 MG/DL — SIGNIFICANT CHANGE UP (ref 7–23)
CALCIUM SERPL-MCNC: 9.1 MG/DL — SIGNIFICANT CHANGE UP (ref 8.5–10.1)
CHLORIDE SERPL-SCNC: 108 MMOL/L — SIGNIFICANT CHANGE UP (ref 96–108)
CO2 SERPL-SCNC: 23 MMOL/L — SIGNIFICANT CHANGE UP (ref 22–31)
CREAT SERPL-MCNC: 1.03 MG/DL — SIGNIFICANT CHANGE UP (ref 0.5–1.3)
EGFR: 73 ML/MIN/1.73M2 — SIGNIFICANT CHANGE UP
GLUCOSE BLDC GLUCOMTR-MCNC: 128 MG/DL — HIGH (ref 70–99)
GLUCOSE BLDC GLUCOMTR-MCNC: 138 MG/DL — HIGH (ref 70–99)
GLUCOSE BLDC GLUCOMTR-MCNC: 153 MG/DL — HIGH (ref 70–99)
GLUCOSE BLDC GLUCOMTR-MCNC: 177 MG/DL — HIGH (ref 70–99)
GLUCOSE SERPL-MCNC: 142 MG/DL — HIGH (ref 70–99)
MAGNESIUM SERPL-MCNC: 1.7 MG/DL — SIGNIFICANT CHANGE UP (ref 1.6–2.6)
PHOSPHATE SERPL-MCNC: 2.9 MG/DL — SIGNIFICANT CHANGE UP (ref 2.5–4.5)
POTASSIUM SERPL-MCNC: 3.7 MMOL/L — SIGNIFICANT CHANGE UP (ref 3.5–5.3)
POTASSIUM SERPL-SCNC: 3.7 MMOL/L — SIGNIFICANT CHANGE UP (ref 3.5–5.3)
SODIUM SERPL-SCNC: 138 MMOL/L — SIGNIFICANT CHANGE UP (ref 135–145)

## 2023-07-20 PROCEDURE — 99232 SBSQ HOSP IP/OBS MODERATE 35: CPT

## 2023-07-20 RX ADMIN — APIXABAN 5 MILLIGRAM(S): 2.5 TABLET, FILM COATED ORAL at 05:25

## 2023-07-20 RX ADMIN — Medication 1 MILLIGRAM(S): at 13:17

## 2023-07-20 RX ADMIN — Medication 1: at 11:31

## 2023-07-20 RX ADMIN — Medication 100 MILLIGRAM(S): at 13:17

## 2023-07-20 RX ADMIN — LOSARTAN POTASSIUM 100 MILLIGRAM(S): 100 TABLET, FILM COATED ORAL at 05:25

## 2023-07-20 RX ADMIN — ATORVASTATIN CALCIUM 10 MILLIGRAM(S): 80 TABLET, FILM COATED ORAL at 22:14

## 2023-07-20 RX ADMIN — Medication 1 TABLET(S): at 13:17

## 2023-07-20 RX ADMIN — SENNA PLUS 2 TABLET(S): 8.6 TABLET ORAL at 22:14

## 2023-07-20 NOTE — PROGRESS NOTE ADULT - SUBJECTIVE AND OBJECTIVE BOX
83 yo male w/ history of HTN, HLD, A. fib on Eliquis, DM II, Etoh use & dementia admitted to [facility] for mechanical fall. He is lying in bed in NAD.     MEDICATIONS  (STANDING):  apixaban 5 milliGRAM(s) Oral two times a day  atorvastatin 10 milliGRAM(s) Oral at bedtime  dextrose 5%. 1000 milliLiter(s) (100 mL/Hr) IV Continuous <Continuous>  dextrose 5%. 1000 milliLiter(s) (50 mL/Hr) IV Continuous <Continuous>  dextrose 50% Injectable 25 Gram(s) IV Push once  dextrose 50% Injectable 12.5 Gram(s) IV Push once  dextrose 50% Injectable 25 Gram(s) IV Push once  folic acid 1 milliGRAM(s) Oral daily  glucagon  Injectable 1 milliGRAM(s) IntraMuscular once  hydrochlorothiazide 25 milliGRAM(s) Oral daily  insulin lispro (ADMELOG) corrective regimen sliding scale   SubCutaneous three times a day before meals  insulin lispro (ADMELOG) corrective regimen sliding scale   SubCutaneous at bedtime  lactated ringers. 1000 milliLiter(s) (100 mL/Hr) IV Continuous <Continuous>  losartan 100 milliGRAM(s) Oral daily  multivitamin 1 Tablet(s) Oral daily  senna 2 Tablet(s) Oral at bedtime  thiamine 100 milliGRAM(s) Oral daily    MEDICATIONS  (PRN):  acetaminophen     Tablet .. 650 milliGRAM(s) Oral every 6 hours PRN Mild Pain (1 - 3), Moderate Pain (4 - 6)  dextrose Oral Gel 15 Gram(s) Oral once PRN Blood Glucose LESS THAN 70 milliGRAM(s)/deciliter  LORazepam   Injectable 2 milliGRAM(s) IV Push every 1 hour PRN Symptom-triggered: each CIWA -Ar score 8 or GREATER  melatonin 3 milliGRAM(s) Oral at bedtime PRN Insomnia      Allergies    No Known Allergies    Intolerances        Vital Signs Last 24 Hrs  T(C): 36.7 (20 Jul 2023 17:05), Max: 36.8 (19 Jul 2023 23:15)  T(F): 98 (20 Jul 2023 17:05), Max: 98.3 (19 Jul 2023 23:15)  HR: 68 (20 Jul 2023 17:05) (60 - 71)  BP: 120/71 (20 Jul 2023 17:05) (115/72 - 138/74)   RR: 18 (20 Jul 2023 17:05) (17 - 18)  SpO2: 97% (20 Jul 2023 17:05) (96% - 97%)    Parameters below as of 20 Jul 2023 10:55  Patient On (Oxygen Delivery Method): room air        PHYSICAL EXAM:  GENERAL: NAD   HEAD:  Atraumatic, Normocephalic  EYES: EOMI, PERRLA   NECK: Supple   NERVOUS SYSTEM:  Alert    CHEST/LUNG: Clear to auscultation bilaterally; No rales, rhonchi, wheezing, or rubs  HEART: Regular rate and rhythm; No murmurs, rubs, or gallops  ABDOMEN: Soft, Nontender, Nondistended; Bowel sounds present  EXTREMITIES:  No clubbing, cyanosis, or edema    LABS:                        13.3   5.43  )-----------( 233      ( 19 Jul 2023 06:06 )             39.2     07-20    138  |  108  |  15  ----------------------------<  142<H>  3.7   |  23  |  1.03    Ca    9.1      20 Jul 2023 07:13  Phos  2.9     07-20  Mg     1.7     07-20    TPro  7.3  /  Alb  3.2<L>  /  TBili  0.5  /  DBili  x   /  AST  17  /  ALT  21  /  AlkPhos  84  07-19      Urinalysis Basic - ( 20 Jul 2023 07:13 )    Color: x / Appearance: x / SG: x / pH: x  Gluc: 142 mg/dL / Ketone: x  / Bili: x / Urobili: x   Blood: x / Protein: x / Nitrite: x   Leuk Esterase: x / RBC: x / WBC x   Sq Epi: x / Non Sq Epi: x / Bacteria: x      CAPILLARY BLOOD GLUCOSE      POCT Blood Glucose.: 138 mg/dL (20 Jul 2023 16:29)  POCT Blood Glucose.: 177 mg/dL (20 Jul 2023 11:31)  POCT Blood Glucose.: 128 mg/dL (20 Jul 2023 07:38)  POCT Blood Glucose.: 120 mg/dL (19 Jul 2023 22:03)      Culture - Blood (collected 17 Jul 2023 06:00)  Source: .Blood Blood-Venous  Preliminary Report (20 Jul 2023 15:01):    No growth at 72 Hours    Culture - Blood (collected 17 Jul 2023 05:45)  Source: .Blood Blood-Peripheral  Preliminary Report (20 Jul 2023 15:01):    No growth at 72 Hours      RADIOLOGY & ADDITIONAL TESTS:

## 2023-07-20 NOTE — PROGRESS NOTE ADULT - ASSESSMENT
83 yo male w/ history of HTN, HLD, A. fib on Eliquis, DM II, Etoh use & dementia admitted to [facility] for mechanical fall.     Mechanical fall  - CT brain- 7/17- no fx, no bleed  - PT recommended Home PT     Hx of etoh abuse  - c/w Thiamine, folic acid, multivitamin  - watch for withdrawal     LOUIS  - resolved  - likely prerenal     DM II  - c/w ISS  - Hgb A1C is 6.8    HTN  - resume losartan & HCTZ    A. fib  - c/w Eliquis     HLD  - c/w Lipitor      Constipation  - c/w Senna    Prophylaxis:  DVT: Eliquis  GI: PO diet    Discharge planning: daughter does not want her father to go back home. not safe discharge. she is requesting rehab placement. No acute medical problems. medically stable for discharge

## 2023-07-21 LAB
GLUCOSE BLDC GLUCOMTR-MCNC: 113 MG/DL — HIGH (ref 70–99)
GLUCOSE BLDC GLUCOMTR-MCNC: 153 MG/DL — HIGH (ref 70–99)
GLUCOSE BLDC GLUCOMTR-MCNC: 163 MG/DL — HIGH (ref 70–99)
GLUCOSE BLDC GLUCOMTR-MCNC: 205 MG/DL — HIGH (ref 70–99)

## 2023-07-21 PROCEDURE — 99232 SBSQ HOSP IP/OBS MODERATE 35: CPT

## 2023-07-21 RX ADMIN — Medication 3 MILLIGRAM(S): at 22:12

## 2023-07-21 RX ADMIN — Medication 1: at 09:10

## 2023-07-21 RX ADMIN — Medication 1 MILLIGRAM(S): at 12:36

## 2023-07-21 RX ADMIN — Medication 2: at 11:40

## 2023-07-21 RX ADMIN — APIXABAN 5 MILLIGRAM(S): 2.5 TABLET, FILM COATED ORAL at 18:35

## 2023-07-21 RX ADMIN — Medication 1 TABLET(S): at 12:35

## 2023-07-21 RX ADMIN — Medication 100 MILLIGRAM(S): at 13:36

## 2023-07-21 RX ADMIN — SENNA PLUS 2 TABLET(S): 8.6 TABLET ORAL at 22:12

## 2023-07-21 RX ADMIN — ATORVASTATIN CALCIUM 10 MILLIGRAM(S): 80 TABLET, FILM COATED ORAL at 22:12

## 2023-07-21 RX ADMIN — LOSARTAN POTASSIUM 100 MILLIGRAM(S): 100 TABLET, FILM COATED ORAL at 06:00

## 2023-07-21 RX ADMIN — APIXABAN 5 MILLIGRAM(S): 2.5 TABLET, FILM COATED ORAL at 06:00

## 2023-07-21 NOTE — PROGRESS NOTE ADULT - ASSESSMENT
81 yo male w/ history of HTN, HLD, A. fib on Eliquis, DM II, Etoh use & dementia admitted to [facility] for mechanical fall.     Mechanical fall  - CT brain- 7/17- no fx, no bleed  - PT recommended Home PT     Hx of etoh abuse  - c/w Thiamine, folic acid, multivitamin  - watch for withdrawal     LOUIS  - resolved  - likely prerenal     DM II  - c/w ISS  - Hgb A1C is 6.8    HTN  - resume losartan & HCTZ    A. fib  - c/w Eliquis     HLD  - c/w Lipitor      Constipation  - c/w Senna    Prophylaxis:  DVT: Eliquis  GI: PO diet    Discharge planning: daughter does not want her father to go back home. not safe discharge. she is requesting rehab placement. No acute medical problems. maleedically stable for discharge

## 2023-07-21 NOTE — PROGRESS NOTE ADULT - SUBJECTIVE AND OBJECTIVE BOX
81 yo male w/ history of HTN, HLD, A. fib on Eliquis, DM II, Etoh use & dementia admitted to [facility] for mechanical fall. He is lying in bed in NAD.      MEDICATIONS  (STANDING):  apixaban 5 milliGRAM(s) Oral two times a day  atorvastatin 10 milliGRAM(s) Oral at bedtime  dextrose 5%. 1000 milliLiter(s) (100 mL/Hr) IV Continuous <Continuous>  dextrose 5%. 1000 milliLiter(s) (50 mL/Hr) IV Continuous <Continuous>  dextrose 50% Injectable 25 Gram(s) IV Push once  dextrose 50% Injectable 25 Gram(s) IV Push once  dextrose 50% Injectable 12.5 Gram(s) IV Push once  folic acid 1 milliGRAM(s) Oral daily  glucagon  Injectable 1 milliGRAM(s) IntraMuscular once  hydrochlorothiazide 25 milliGRAM(s) Oral daily  insulin lispro (ADMELOG) corrective regimen sliding scale   SubCutaneous three times a day before meals  insulin lispro (ADMELOG) corrective regimen sliding scale   SubCutaneous at bedtime  lactated ringers. 1000 milliLiter(s) (100 mL/Hr) IV Continuous <Continuous>  losartan 100 milliGRAM(s) Oral daily  multivitamin 1 Tablet(s) Oral daily  senna 2 Tablet(s) Oral at bedtime  thiamine 100 milliGRAM(s) Oral daily    MEDICATIONS  (PRN):  acetaminophen     Tablet .. 650 milliGRAM(s) Oral every 6 hours PRN Mild Pain (1 - 3), Moderate Pain (4 - 6)  dextrose Oral Gel 15 Gram(s) Oral once PRN Blood Glucose LESS THAN 70 milliGRAM(s)/deciliter  LORazepam   Injectable 2 milliGRAM(s) IV Push every 1 hour PRN Symptom-triggered: each CIWA -Ar score 8 or GREATER  melatonin 3 milliGRAM(s) Oral at bedtime PRN Insomnia      Allergies    No Known Allergies    Intolerances        Vital Signs Last 24 Hrs  T(C): 36.4 (21 Jul 2023 17:52), Max: 36.7 (21 Jul 2023 01:10)  T(F): 97.6 (21 Jul 2023 17:52), Max: 98.1 (21 Jul 2023 01:10)  HR: 89 (21 Jul 2023 17:52) (60 - 89)  BP: 112/74 (21 Jul 2023 17:52) (106/66 - 142/90)   RR: 19 (21 Jul 2023 17:52) (18 - 19)  SpO2: 97% (21 Jul 2023 17:52) (96% - 98%)        PHYSICAL EXAM:  GENERAL: NAD   HEAD:  Atraumatic, Normocephalic  EYES: EOMI, PERRLA   NECK: Supple   NERVOUS SYSTEM:  Alert    CHEST/LUNG: Clear to auscultation bilaterally; No rales, rhonchi, wheezing, or rubs  HEART: Regular rate and rhythm; No murmurs, rubs, or gallops  ABDOMEN: Soft, Nontender, Nondistended; Bowel sounds present  EXTREMITIES:  No clubbing, cyanosis, or edema    LABS:    07-20    138  |  108  |  15  ----------------------------<  142<H>  3.7   |  23  |  1.03    Ca    9.1      20 Jul 2023 07:13  Phos  2.9     07-20  Mg     1.7     07-20        Urinalysis Basic - ( 20 Jul 2023 07:13 )    Color: x / Appearance: x / SG: x / pH: x  Gluc: 142 mg/dL / Ketone: x  / Bili: x / Urobili: x   Blood: x / Protein: x / Nitrite: x   Leuk Esterase: x / RBC: x / WBC x   Sq Epi: x / Non Sq Epi: x / Bacteria: x      CAPILLARY BLOOD GLUCOSE      POCT Blood Glucose.: 113 mg/dL (21 Jul 2023 16:38)  POCT Blood Glucose.: 205 mg/dL (21 Jul 2023 11:25)  POCT Blood Glucose.: 153 mg/dL (21 Jul 2023 08:15)  POCT Blood Glucose.: 153 mg/dL (20 Jul 2023 21:54)      Culture - Blood (collected 17 Jul 2023 06:00)  Source: .Blood Blood-Venous  Preliminary Report (21 Jul 2023 15:01):    No growth at 4 days    Culture - Blood (collected 17 Jul 2023 05:45)  Source: .Blood Blood-Peripheral  Preliminary Report (21 Jul 2023 15:01):    No growth at 4 days      RADIOLOGY & ADDITIONAL TESTS:    07-20-23 @ 07:01  -  07-21-23 @ 07:00  --------------------------------------------------------  IN:  Total IN: 0 mL    OUT:    Voided (mL): 300 mL  Total OUT: 300 mL    Total NET: -300 mL

## 2023-07-22 LAB
CULTURE RESULTS: SIGNIFICANT CHANGE UP
CULTURE RESULTS: SIGNIFICANT CHANGE UP
GLUCOSE BLDC GLUCOMTR-MCNC: 132 MG/DL — HIGH (ref 70–99)
GLUCOSE BLDC GLUCOMTR-MCNC: 143 MG/DL — HIGH (ref 70–99)
GLUCOSE BLDC GLUCOMTR-MCNC: 163 MG/DL — HIGH (ref 70–99)
GLUCOSE BLDC GLUCOMTR-MCNC: 228 MG/DL — HIGH (ref 70–99)
SPECIMEN SOURCE: SIGNIFICANT CHANGE UP
SPECIMEN SOURCE: SIGNIFICANT CHANGE UP

## 2023-07-22 PROCEDURE — 99232 SBSQ HOSP IP/OBS MODERATE 35: CPT

## 2023-07-22 RX ADMIN — Medication 2: at 11:29

## 2023-07-22 RX ADMIN — Medication 100 MILLIGRAM(S): at 12:31

## 2023-07-22 RX ADMIN — ATORVASTATIN CALCIUM 10 MILLIGRAM(S): 80 TABLET, FILM COATED ORAL at 22:06

## 2023-07-22 RX ADMIN — APIXABAN 5 MILLIGRAM(S): 2.5 TABLET, FILM COATED ORAL at 18:18

## 2023-07-22 RX ADMIN — Medication 1 TABLET(S): at 12:31

## 2023-07-22 RX ADMIN — Medication 1 MILLIGRAM(S): at 12:30

## 2023-07-22 RX ADMIN — LOSARTAN POTASSIUM 100 MILLIGRAM(S): 100 TABLET, FILM COATED ORAL at 05:53

## 2023-07-22 RX ADMIN — SENNA PLUS 2 TABLET(S): 8.6 TABLET ORAL at 22:10

## 2023-07-22 RX ADMIN — APIXABAN 5 MILLIGRAM(S): 2.5 TABLET, FILM COATED ORAL at 05:53

## 2023-07-22 NOTE — PROGRESS NOTE ADULT - ASSESSMENT
83 yo male w/ history of HTN, HLD, A. fib on Eliquis, DM II, Etoh use & dementia admitted to [facility] for mechanical fall.     Mechanical fall  - CT brain- 7/17- no fx, no bleed  - PT recommended Home PT     Hx of etoh abuse  - c/w Thiamine, folic acid, multivitamin  - watch for withdrawal     LOUIS  - resolved  - likely prerenal     DM II  - c/w ISS  - Hgb A1C is 6.8    HTN  - resume losartan & HCTZ    A. fib  - c/w Eliquis     HLD  - c/w Lipitor      Constipation  - c/w Senna    Prophylaxis:  DVT: Eliquis  GI: PO diet    Discharge planning: daughter does not want her father to go back home. not safe discharge. she is requesting rehab placement. No acute medical problems. medically stable for discharge

## 2023-07-22 NOTE — PROGRESS NOTE ADULT - SUBJECTIVE AND OBJECTIVE BOX
83 yo male w/ history of HTN, HLD, A. fib on Eliquis, DM II, Etoh use & dementia admitted to [facility] for mechanical fall. He is lying in bed in NAD.      MEDICATIONS  (STANDING):  apixaban 5 milliGRAM(s) Oral two times a day  atorvastatin 10 milliGRAM(s) Oral at bedtime  dextrose 5%. 1000 milliLiter(s) (100 mL/Hr) IV Continuous <Continuous>  dextrose 5%. 1000 milliLiter(s) (50 mL/Hr) IV Continuous <Continuous>  dextrose 50% Injectable 25 Gram(s) IV Push once  dextrose 50% Injectable 12.5 Gram(s) IV Push once  dextrose 50% Injectable 25 Gram(s) IV Push once  folic acid 1 milliGRAM(s) Oral daily  glucagon  Injectable 1 milliGRAM(s) IntraMuscular once  hydrochlorothiazide 25 milliGRAM(s) Oral daily  insulin lispro (ADMELOG) corrective regimen sliding scale   SubCutaneous three times a day before meals  insulin lispro (ADMELOG) corrective regimen sliding scale   SubCutaneous at bedtime  lactated ringers. 1000 milliLiter(s) (100 mL/Hr) IV Continuous <Continuous>  losartan 100 milliGRAM(s) Oral daily  multivitamin 1 Tablet(s) Oral daily  senna 2 Tablet(s) Oral at bedtime  thiamine 100 milliGRAM(s) Oral daily    MEDICATIONS  (PRN):  acetaminophen     Tablet .. 650 milliGRAM(s) Oral every 6 hours PRN Mild Pain (1 - 3), Moderate Pain (4 - 6)  dextrose Oral Gel 15 Gram(s) Oral once PRN Blood Glucose LESS THAN 70 milliGRAM(s)/deciliter  LORazepam   Injectable 2 milliGRAM(s) IV Push every 1 hour PRN Symptom-triggered: each CIWA -Ar score 8 or GREATER  melatonin 3 milliGRAM(s) Oral at bedtime PRN Insomnia      Allergies    No Known Allergies    Intolerances        Vital Signs Last 24 Hrs  T(C): 36.5 (22 Jul 2023 18:14), Max: 36.5 (21 Jul 2023 23:33)  T(F): 97.7 (22 Jul 2023 18:14), Max: 97.7 (21 Jul 2023 23:33)  HR: 96 (22 Jul 2023 18:14) (54 - 96)  BP: 125/84 (22 Jul 2023 18:14) (108/66 - 125/84)   RR: 17 (22 Jul 2023 18:14) (16 - 18)  SpO2: 97% (22 Jul 2023 18:14) (97% - 98%)    Parameters below as of 22 Jul 2023 05:53  Patient On (Oxygen Delivery Method): room air        PHYSICAL EXAM:  GENERAL: NAD   HEAD:  Atraumatic, Normocephalic  EYES: EOMI, PERRLA   NECK: Supple   NERVOUS SYSTEM:  Alert    CHEST/LUNG: Clear to auscultation bilaterally; No rales, rhonchi, wheezing, or rubs  HEART: Regular rate and rhythm; No murmurs, rubs, or gallops  ABDOMEN: Soft, Nontender, Nondistended; Bowel sounds present  EXTREMITIES:  No clubbing, cyanosis, or edema      LABS:     POCT Blood Glucose.: 143 mg/dL (22 Jul 2023 16:43)  POCT Blood Glucose.: 228 mg/dL (22 Jul 2023 11:20)  POCT Blood Glucose.: 132 mg/dL (22 Jul 2023 08:54)  POCT Blood Glucose.: 163 mg/dL (21 Jul 2023 21:51)      RADIOLOGY & ADDITIONAL TESTS:    07-21-23 @ 07:01  -  07-22-23 @ 07:00  --------------------------------------------------------  IN:    Oral Fluid: 740 mL  Total IN: 740 mL    OUT:    Voided (mL): 700 mL  Total OUT: 700 mL    Total NET: 40 mL

## 2023-07-23 LAB
GLUCOSE BLDC GLUCOMTR-MCNC: 120 MG/DL — HIGH (ref 70–99)
GLUCOSE BLDC GLUCOMTR-MCNC: 150 MG/DL — HIGH (ref 70–99)
GLUCOSE BLDC GLUCOMTR-MCNC: 209 MG/DL — HIGH (ref 70–99)
GLUCOSE BLDC GLUCOMTR-MCNC: 99 MG/DL — SIGNIFICANT CHANGE UP (ref 70–99)

## 2023-07-23 PROCEDURE — 99232 SBSQ HOSP IP/OBS MODERATE 35: CPT

## 2023-07-23 RX ADMIN — Medication 100 MILLIGRAM(S): at 13:00

## 2023-07-23 RX ADMIN — Medication 2: at 13:01

## 2023-07-23 RX ADMIN — APIXABAN 5 MILLIGRAM(S): 2.5 TABLET, FILM COATED ORAL at 17:48

## 2023-07-23 RX ADMIN — LOSARTAN POTASSIUM 100 MILLIGRAM(S): 100 TABLET, FILM COATED ORAL at 06:21

## 2023-07-23 RX ADMIN — Medication 1 MILLIGRAM(S): at 13:00

## 2023-07-23 RX ADMIN — ATORVASTATIN CALCIUM 10 MILLIGRAM(S): 80 TABLET, FILM COATED ORAL at 21:32

## 2023-07-23 RX ADMIN — Medication 1 TABLET(S): at 13:00

## 2023-07-23 RX ADMIN — APIXABAN 5 MILLIGRAM(S): 2.5 TABLET, FILM COATED ORAL at 06:22

## 2023-07-23 NOTE — PROGRESS NOTE ADULT - SUBJECTIVE AND OBJECTIVE BOX
81 yo male w/ history of HTN, HLD, A. fib on Eliquis, DM II, Etoh use & dementia admitted to [facility] for mechanical fall. He is lying in bed in NAD.       MEDICATIONS  (STANDING):  apixaban 5 milliGRAM(s) Oral two times a day  atorvastatin 10 milliGRAM(s) Oral at bedtime  dextrose 5%. 1000 milliLiter(s) (100 mL/Hr) IV Continuous <Continuous>  dextrose 5%. 1000 milliLiter(s) (50 mL/Hr) IV Continuous <Continuous>  dextrose 50% Injectable 25 Gram(s) IV Push once  dextrose 50% Injectable 12.5 Gram(s) IV Push once  dextrose 50% Injectable 25 Gram(s) IV Push once  folic acid 1 milliGRAM(s) Oral daily  glucagon  Injectable 1 milliGRAM(s) IntraMuscular once  hydrochlorothiazide 25 milliGRAM(s) Oral daily  insulin lispro (ADMELOG) corrective regimen sliding scale   SubCutaneous three times a day before meals  insulin lispro (ADMELOG) corrective regimen sliding scale   SubCutaneous at bedtime  lactated ringers. 1000 milliLiter(s) (100 mL/Hr) IV Continuous <Continuous>  losartan 100 milliGRAM(s) Oral daily  multivitamin 1 Tablet(s) Oral daily  senna 2 Tablet(s) Oral at bedtime  thiamine 100 milliGRAM(s) Oral daily    MEDICATIONS  (PRN):  acetaminophen     Tablet .. 650 milliGRAM(s) Oral every 6 hours PRN Mild Pain (1 - 3), Moderate Pain (4 - 6)  dextrose Oral Gel 15 Gram(s) Oral once PRN Blood Glucose LESS THAN 70 milliGRAM(s)/deciliter  LORazepam   Injectable 2 milliGRAM(s) IV Push every 1 hour PRN Symptom-triggered: each CIWA -Ar score 8 or GREATER  melatonin 3 milliGRAM(s) Oral at bedtime PRN Insomnia      Allergies    No Known Allergies    Intolerances        Vital Signs Last 24 Hrs  T(C): 36.7 (23 Jul 2023 17:59), Max: 36.7 (23 Jul 2023 06:16)  T(F): 98 (23 Jul 2023 17:59), Max: 98 (23 Jul 2023 06:16)  HR: 72 (23 Jul 2023 17:59) (72 - 96)  BP: 120/73 (23 Jul 2023 17:59) (112/80 - 128/88)   RR: 16 (23 Jul 2023 17:59) (16 - 18)  SpO2: 96% (23 Jul 2023 17:59) (95% - 98%)    Parameters below as of 23 Jul 2023 06:16  Patient On (Oxygen Delivery Method): room air        PHYSICAL EXAM:  GENERAL: NAD   HEAD:  Atraumatic, Normocephalic  EYES: EOMI, PERRLA   NECK: Supple   NERVOUS SYSTEM:  Alert    CHEST/LUNG: Clear to auscultation bilaterally; No rales, rhonchi, wheezing, or rubs  HEART: Regular rate and rhythm; No murmurs, rubs, or gallops  ABDOMEN: Soft, Nontender, Nondistended; Bowel sounds present  EXTREMITIES:  No clubbing, cyanosis, or edema    LABS:              CAPILLARY BLOOD GLUCOSE      POCT Blood Glucose.: 99 mg/dL (23 Jul 2023 16:33)  POCT Blood Glucose.: 209 mg/dL (23 Jul 2023 12:24)  POCT Blood Glucose.: 120 mg/dL (23 Jul 2023 08:06)  POCT Blood Glucose.: 163 mg/dL (22 Jul 2023 21:13)      RADIOLOGY & ADDITIONAL TESTS:    07-22-23 @ 07:01  -  07-23-23 @ 07:00  --------------------------------------------------------  IN:    Oral Fluid: 620 mL  Total IN: 620 mL    OUT:    Voided (mL): 600 mL  Total OUT: 600 mL    Total NET: 20 mL      07-23-23 @ 07:01  -  07-23-23 @ 19:00  --------------------------------------------------------  IN:    Oral Fluid: 220 mL  Total IN: 220 mL    OUT:  Total OUT: 0 mL    Total NET: 220 mL

## 2023-07-23 NOTE — PROGRESS NOTE ADULT - ASSESSMENT
83 yo male w/ history of HTN, HLD, A. fib on Eliquis, DM II, Etoh use & dementia admitted to [facility] for mechanical fall.     Mechanical fall  - CT brain- 7/17- no fx, no bleed  - PT recommended Home PT     Hx of etoh abuse  - c/w Thiamine, folic acid, multivitamin  - watch for withdrawal     LOUIS  - resolved  - likely prerenal     DM II  - c/w ISS  - Hgb A1C is 6.8    HTN  - resume losartan & HCTZ    A. fib  - c/w Eliquis     HLD  - c/w Lipitor      Constipation  - c/w Senna    Prophylaxis:  DVT: Eliquis  GI: PO diet    Discharge planning: daughter does not want her father to go back home. not safe discharge. she is requesting rehab placement. No acute medical problems. Medically stable for discharge

## 2023-07-24 LAB
GLUCOSE BLDC GLUCOMTR-MCNC: 130 MG/DL — HIGH (ref 70–99)
GLUCOSE BLDC GLUCOMTR-MCNC: 135 MG/DL — HIGH (ref 70–99)
GLUCOSE BLDC GLUCOMTR-MCNC: 200 MG/DL — HIGH (ref 70–99)
GLUCOSE BLDC GLUCOMTR-MCNC: 246 MG/DL — HIGH (ref 70–99)

## 2023-07-24 PROCEDURE — 99232 SBSQ HOSP IP/OBS MODERATE 35: CPT

## 2023-07-24 RX ADMIN — APIXABAN 5 MILLIGRAM(S): 2.5 TABLET, FILM COATED ORAL at 05:47

## 2023-07-24 RX ADMIN — Medication 1: at 11:49

## 2023-07-24 RX ADMIN — APIXABAN 5 MILLIGRAM(S): 2.5 TABLET, FILM COATED ORAL at 19:27

## 2023-07-24 RX ADMIN — Medication 100 MILLIGRAM(S): at 11:50

## 2023-07-24 RX ADMIN — ATORVASTATIN CALCIUM 10 MILLIGRAM(S): 80 TABLET, FILM COATED ORAL at 21:36

## 2023-07-24 RX ADMIN — Medication 1 TABLET(S): at 11:51

## 2023-07-24 RX ADMIN — LOSARTAN POTASSIUM 100 MILLIGRAM(S): 100 TABLET, FILM COATED ORAL at 05:47

## 2023-07-24 RX ADMIN — Medication 3 MILLIGRAM(S): at 21:36

## 2023-07-24 RX ADMIN — Medication 1 MILLIGRAM(S): at 11:51

## 2023-07-24 RX ADMIN — SENNA PLUS 2 TABLET(S): 8.6 TABLET ORAL at 21:36

## 2023-07-24 NOTE — PROGRESS NOTE ADULT - SUBJECTIVE AND OBJECTIVE BOX
81 yo male w/ history of HTN, HLD, A. fib on Eliquis, DM II, Etoh use & dementia admitted to [facility] for mechanical fall. He is lying in bed in NAD.      MEDICATIONS  (STANDING):  apixaban 5 milliGRAM(s) Oral two times a day  atorvastatin 10 milliGRAM(s) Oral at bedtime  dextrose 5%. 1000 milliLiter(s) (100 mL/Hr) IV Continuous <Continuous>  dextrose 5%. 1000 milliLiter(s) (50 mL/Hr) IV Continuous <Continuous>  dextrose 50% Injectable 25 Gram(s) IV Push once  dextrose 50% Injectable 12.5 Gram(s) IV Push once  dextrose 50% Injectable 25 Gram(s) IV Push once  folic acid 1 milliGRAM(s) Oral daily  glucagon  Injectable 1 milliGRAM(s) IntraMuscular once  hydrochlorothiazide 25 milliGRAM(s) Oral daily  insulin lispro (ADMELOG) corrective regimen sliding scale   SubCutaneous three times a day before meals  insulin lispro (ADMELOG) corrective regimen sliding scale   SubCutaneous at bedtime  lactated ringers. 1000 milliLiter(s) (100 mL/Hr) IV Continuous <Continuous>  losartan 100 milliGRAM(s) Oral daily  multivitamin 1 Tablet(s) Oral daily  senna 2 Tablet(s) Oral at bedtime  thiamine 100 milliGRAM(s) Oral daily    MEDICATIONS  (PRN):  acetaminophen     Tablet .. 650 milliGRAM(s) Oral every 6 hours PRN Mild Pain (1 - 3), Moderate Pain (4 - 6)  dextrose Oral Gel 15 Gram(s) Oral once PRN Blood Glucose LESS THAN 70 milliGRAM(s)/deciliter  LORazepam   Injectable 2 milliGRAM(s) IV Push every 1 hour PRN Symptom-triggered: each CIWA -Ar score 8 or GREATER  melatonin 3 milliGRAM(s) Oral at bedtime PRN Insomnia      Allergies    No Known Allergies    Intolerances        Vital Signs Last 24 Hrs  T(C): 36.7 (24 Jul 2023 16:42), Max: 36.8 (24 Jul 2023 05:43)  T(F): 98.1 (24 Jul 2023 16:42), Max: 98.3 (24 Jul 2023 05:43)  HR: 99 (24 Jul 2023 16:42) (60 - 99)  BP: 129/82 (24 Jul 2023 16:42) (106/71 - 129/82)   RR: 18 (24 Jul 2023 16:42) (17 - 18)  SpO2: 97% (24 Jul 2023 16:42) (96% - 99%)    Parameters below as of 24 Jul 2023 16:42  Patient On (Oxygen Delivery Method): room air        PHYSICAL EXAM:  GENERAL: NAD   HEAD:  Atraumatic, Normocephalic  EYES: EOMI, PERRLA   NECK: Supple   NERVOUS SYSTEM:  Alert    CHEST/LUNG: Clear to auscultation bilaterally; No rales, rhonchi, wheezing, or rubs  HEART: Regular rate and rhythm; No murmurs, rubs, or gallops  ABDOMEN: Soft, Nontender, Nondistended; Bowel sounds present  EXTREMITIES:  No clubbing, cyanosis, or edema    LABS:       POCT Blood Glucose.: 130 mg/dL (24 Jul 2023 16:07)  POCT Blood Glucose.: 200 mg/dL (24 Jul 2023 11:07)  POCT Blood Glucose.: 135 mg/dL (24 Jul 2023 07:46)  POCT Blood Glucose.: 150 mg/dL (23 Jul 2023 21:19)      RADIOLOGY & ADDITIONAL TESTS:    07-23-23 @ 07:01  -  07-24-23 @ 07:00  --------------------------------------------------------  IN:    Oral Fluid: 220 mL  Total IN: 220 mL    OUT:    Voided (mL): 650 mL  Total OUT: 650 mL    Total NET: -430 mL      07-24-23 @ 07:01  -  07-24-23 @ 19:56  --------------------------------------------------------  IN:    Oral Fluid: 240 mL  Total IN: 240 mL    OUT:  Total OUT: 0 mL    Total NET: 240 mL

## 2023-07-24 NOTE — PROGRESS NOTE ADULT - ASSESSMENT
83 yo male w/ history of HTN, HLD, A. fib on Eliquis, DM II, Etoh use & dementia admitted to [facility] for mechanical fall.     Mechanical fall  - CT brain- 7/17- no fx, no bleed  - PT recommended Home PT     Hx of etoh abuse  - c/w Thiamine, folic acid, multivitamin  - watch for withdrawal     LOUIS  - resolved  - likely prerenal     DM II  - c/w ISS  - Hgb A1C is 6.8    HTN  - resume losartan & HCTZ    A. fib  - c/w Eliquis     HLD  - c/w Lipitor      Constipation  - c/w Senna    Prophylaxis:  DVT: Eliquis  GI: PO diet    Discharge planning: daughter does not want her father to go back home. not safe discharge. she is requesting rehab placement. No acute medical problems. Medically stable for discharge

## 2023-07-24 NOTE — CHART NOTE - NSCHARTNOTEFT_GEN_A_CORE
Patient seen for length of stay nutrition risk rescreening. Patient has been screened for and presents negative for:    -Pressure ulcers stage 2 or greater  -Enteral or Parenteral Nutrition  -BMI <18  -UpabaydgaaZ8G >8  -Chewing/Swallowing Difficulty  -Decreased appetite  -Unintentional Weight Loss  -Inadequate diet (i.e. NPO/Clear Liquids)    Pt admitted s/p mechanical fall  Pt eating well; % most meals  Pt is medically stable for d/c  Not safe for pt to return home; daughter to meet c SANIA Tuesday, 7/25 to complete paperwork for senior living d/c to LTC facility    No intervention required at this time. RD remains available.

## 2023-07-25 LAB
ANION GAP SERPL CALC-SCNC: 6 MMOL/L — SIGNIFICANT CHANGE UP (ref 5–17)
BUN SERPL-MCNC: 20 MG/DL — SIGNIFICANT CHANGE UP (ref 7–23)
CALCIUM SERPL-MCNC: 9.3 MG/DL — SIGNIFICANT CHANGE UP (ref 8.5–10.1)
CHLORIDE SERPL-SCNC: 105 MMOL/L — SIGNIFICANT CHANGE UP (ref 96–108)
CO2 SERPL-SCNC: 28 MMOL/L — SIGNIFICANT CHANGE UP (ref 22–31)
CREAT SERPL-MCNC: 1.18 MG/DL — SIGNIFICANT CHANGE UP (ref 0.5–1.3)
EGFR: 62 ML/MIN/1.73M2 — SIGNIFICANT CHANGE UP
GLUCOSE BLDC GLUCOMTR-MCNC: 139 MG/DL — HIGH (ref 70–99)
GLUCOSE BLDC GLUCOMTR-MCNC: 142 MG/DL — HIGH (ref 70–99)
GLUCOSE BLDC GLUCOMTR-MCNC: 153 MG/DL — HIGH (ref 70–99)
GLUCOSE BLDC GLUCOMTR-MCNC: 301 MG/DL — HIGH (ref 70–99)
GLUCOSE SERPL-MCNC: 132 MG/DL — HIGH (ref 70–99)
HCT VFR BLD CALC: 45.1 % — SIGNIFICANT CHANGE UP (ref 39–50)
HGB BLD-MCNC: 14.9 G/DL — SIGNIFICANT CHANGE UP (ref 13–17)
MAGNESIUM SERPL-MCNC: 1.8 MG/DL — SIGNIFICANT CHANGE UP (ref 1.6–2.6)
MCHC RBC-ENTMCNC: 33 G/DL — SIGNIFICANT CHANGE UP (ref 32–36)
MCHC RBC-ENTMCNC: 33.3 PG — SIGNIFICANT CHANGE UP (ref 27–34)
MCV RBC AUTO: 100.9 FL — HIGH (ref 80–100)
NRBC # BLD: 0 /100 WBCS — SIGNIFICANT CHANGE UP (ref 0–0)
PHOSPHATE SERPL-MCNC: 3.4 MG/DL — SIGNIFICANT CHANGE UP (ref 2.5–4.5)
PLATELET # BLD AUTO: 251 K/UL — SIGNIFICANT CHANGE UP (ref 150–400)
POTASSIUM SERPL-MCNC: 3.8 MMOL/L — SIGNIFICANT CHANGE UP (ref 3.5–5.3)
POTASSIUM SERPL-SCNC: 3.8 MMOL/L — SIGNIFICANT CHANGE UP (ref 3.5–5.3)
RBC # BLD: 4.47 M/UL — SIGNIFICANT CHANGE UP (ref 4.2–5.8)
RBC # FLD: 12.8 % — SIGNIFICANT CHANGE UP (ref 10.3–14.5)
SODIUM SERPL-SCNC: 139 MMOL/L — SIGNIFICANT CHANGE UP (ref 135–145)
WBC # BLD: 4.82 K/UL — SIGNIFICANT CHANGE UP (ref 3.8–10.5)
WBC # FLD AUTO: 4.82 K/UL — SIGNIFICANT CHANGE UP (ref 3.8–10.5)

## 2023-07-25 PROCEDURE — 99232 SBSQ HOSP IP/OBS MODERATE 35: CPT

## 2023-07-25 RX ADMIN — APIXABAN 5 MILLIGRAM(S): 2.5 TABLET, FILM COATED ORAL at 05:09

## 2023-07-25 RX ADMIN — Medication 1: at 12:52

## 2023-07-25 RX ADMIN — Medication 4: at 17:07

## 2023-07-25 RX ADMIN — APIXABAN 5 MILLIGRAM(S): 2.5 TABLET, FILM COATED ORAL at 17:09

## 2023-07-25 RX ADMIN — Medication 100 MILLIGRAM(S): at 13:52

## 2023-07-25 RX ADMIN — Medication 1 TABLET(S): at 15:50

## 2023-07-25 RX ADMIN — ATORVASTATIN CALCIUM 10 MILLIGRAM(S): 80 TABLET, FILM COATED ORAL at 21:39

## 2023-07-25 RX ADMIN — Medication 1 MILLIGRAM(S): at 13:51

## 2023-07-25 RX ADMIN — LOSARTAN POTASSIUM 100 MILLIGRAM(S): 100 TABLET, FILM COATED ORAL at 05:09

## 2023-07-25 NOTE — PROGRESS NOTE ADULT - ASSESSMENT
81 yo male w/ history of HTN, HLD, A. fib on Eliquis, DM II, Etoh use & dementia admitted to [facility] for mechanical fall.     Mechanical fall  - CT brain- 7/17- no fx, no bleed  - PT recommended Home PT     Hx of etoh abuse  - c/w Thiamine, folic acid, multivitamin  - watch for withdrawal     LOUIS  - resolved  - likely prerenal     DM II  - c/w ISS  - Hgb A1C is 6.8    HTN  - resume losartan & HCTZ    A. fib  - c/w Eliquis     HLD  - c/w Lipitor      Constipation  - c/w Senna    Prophylaxis:  DVT: Eliquis  GI: PO diet    Discharge planning: daughter does not want her father to go back home. not safe discharge. she is requesting rehab placement. No acute medical problems. Medically stable for discharge

## 2023-07-25 NOTE — PROGRESS NOTE ADULT - SUBJECTIVE AND OBJECTIVE BOX
81 yo male w/ history of HTN, HLD, A. fib on Eliquis, DM II, Etoh use & dementia admitted to [facility] for mechanical fall. He is lying in bed in NAD.       MEDICATIONS  (STANDING):  apixaban 5 milliGRAM(s) Oral two times a day  atorvastatin 10 milliGRAM(s) Oral at bedtime  dextrose 5%. 1000 milliLiter(s) (100 mL/Hr) IV Continuous <Continuous>  dextrose 5%. 1000 milliLiter(s) (50 mL/Hr) IV Continuous <Continuous>  dextrose 50% Injectable 25 Gram(s) IV Push once  dextrose 50% Injectable 12.5 Gram(s) IV Push once  dextrose 50% Injectable 25 Gram(s) IV Push once  folic acid 1 milliGRAM(s) Oral daily  glucagon  Injectable 1 milliGRAM(s) IntraMuscular once  hydrochlorothiazide 25 milliGRAM(s) Oral daily  insulin lispro (ADMELOG) corrective regimen sliding scale   SubCutaneous three times a day before meals  insulin lispro (ADMELOG) corrective regimen sliding scale   SubCutaneous at bedtime  lactated ringers. 1000 milliLiter(s) (100 mL/Hr) IV Continuous <Continuous>  losartan 100 milliGRAM(s) Oral daily  multivitamin 1 Tablet(s) Oral daily  senna 2 Tablet(s) Oral at bedtime  thiamine 100 milliGRAM(s) Oral daily    MEDICATIONS  (PRN):  acetaminophen     Tablet .. 650 milliGRAM(s) Oral every 6 hours PRN Mild Pain (1 - 3), Moderate Pain (4 - 6)  dextrose Oral Gel 15 Gram(s) Oral once PRN Blood Glucose LESS THAN 70 milliGRAM(s)/deciliter  melatonin 3 milliGRAM(s) Oral at bedtime PRN Insomnia      Allergies    No Known Allergies    Intolerances        Vital Signs Last 24 Hrs  T(C): 36.8 (25 Jul 2023 16:41), Max: 36.8 (25 Jul 2023 16:41)  T(F): 98.2 (25 Jul 2023 16:41), Max: 98.2 (25 Jul 2023 16:41)  HR: 75 (25 Jul 2023 16:41) (72 - 93)  BP: 109/72 (25 Jul 2023 16:41) (100/62 - 122/71)   RR: 18 (25 Jul 2023 16:41) (17 - 18)  SpO2: 100% (25 Jul 2023 16:41) (95% - 100%)    Parameters below as of 25 Jul 2023 16:41  Patient On (Oxygen Delivery Method): room air        PHYSICAL EXAM:  GENERAL: NAD   HEAD:  Atraumatic, Normocephalic  EYES: EOMI, PERRLA   NECK: Supple   NERVOUS SYSTEM:  Alert    CHEST/LUNG: Clear to auscultation bilaterally; No rales, rhonchi, wheezing, or rubs  HEART: Regular rate and rhythm; No murmurs, rubs, or gallops  ABDOMEN: Soft, Nontender, Nondistended; Bowel sounds present  EXTREMITIES:  No clubbing, cyanosis, or edema    LABS:                        14.9   4.82  )-----------( 251      ( 25 Jul 2023 07:00 )             45.1     07-25    139  |  105  |  20  ----------------------------<  132<H>  3.8   |  28  |  1.18    Ca    9.3      25 Jul 2023 07:00  Phos  3.4     07-25  Mg     1.8     07-25        Urinalysis Basic - ( 25 Jul 2023 07:00 )    Color: x / Appearance: x / SG: x / pH: x  Gluc: 132 mg/dL / Ketone: x  / Bili: x / Urobili: x   Blood: x / Protein: x / Nitrite: x   Leuk Esterase: x / RBC: x / WBC x   Sq Epi: x / Non Sq Epi: x / Bacteria: x      CAPILLARY BLOOD GLUCOSE      POCT Blood Glucose.: 301 mg/dL (25 Jul 2023 16:22)  POCT Blood Glucose.: 153 mg/dL (25 Jul 2023 11:55)  POCT Blood Glucose.: 139 mg/dL (25 Jul 2023 08:26)  POCT Blood Glucose.: 246 mg/dL (24 Jul 2023 21:08)      RADIOLOGY & ADDITIONAL TESTS:    07-24-23 @ 07:01  -  07-25-23 @ 07:00  --------------------------------------------------------  IN:    Oral Fluid: 540 mL  Total IN: 540 mL    OUT:    Voided (mL): 300 mL  Total OUT: 300 mL    Total NET: 240 mL      07-25-23 @ 07:01  -  07-25-23 @ 20:47  --------------------------------------------------------  IN:    Oral Fluid: 240 mL  Total IN: 240 mL    OUT:  Total OUT: 0 mL    Total NET: 240 mL

## 2023-07-26 LAB
GLUCOSE BLDC GLUCOMTR-MCNC: 142 MG/DL — HIGH (ref 70–99)
GLUCOSE BLDC GLUCOMTR-MCNC: 147 MG/DL — HIGH (ref 70–99)
GLUCOSE BLDC GLUCOMTR-MCNC: 174 MG/DL — HIGH (ref 70–99)
GLUCOSE BLDC GLUCOMTR-MCNC: 242 MG/DL — HIGH (ref 70–99)

## 2023-07-26 PROCEDURE — 99232 SBSQ HOSP IP/OBS MODERATE 35: CPT

## 2023-07-26 RX ADMIN — LOSARTAN POTASSIUM 100 MILLIGRAM(S): 100 TABLET, FILM COATED ORAL at 05:21

## 2023-07-26 RX ADMIN — ATORVASTATIN CALCIUM 10 MILLIGRAM(S): 80 TABLET, FILM COATED ORAL at 21:43

## 2023-07-26 RX ADMIN — Medication 100 MILLIGRAM(S): at 12:02

## 2023-07-26 RX ADMIN — SENNA PLUS 2 TABLET(S): 8.6 TABLET ORAL at 21:43

## 2023-07-26 RX ADMIN — APIXABAN 5 MILLIGRAM(S): 2.5 TABLET, FILM COATED ORAL at 17:58

## 2023-07-26 RX ADMIN — Medication 1 MILLIGRAM(S): at 12:02

## 2023-07-26 RX ADMIN — Medication 2: at 12:02

## 2023-07-26 RX ADMIN — Medication 1 TABLET(S): at 12:03

## 2023-07-26 RX ADMIN — APIXABAN 5 MILLIGRAM(S): 2.5 TABLET, FILM COATED ORAL at 05:21

## 2023-07-26 NOTE — PROGRESS NOTE ADULT - SUBJECTIVE AND OBJECTIVE BOX
patient seen and examined  no acute overnight events  patient pleasant in positive  Review of Systems:  General:denies fever chills, headache, weakness  HEENT: denies blurry vision,diffculty swallowing, difficulty hearing, tinnitus  Cardiovascular: denies chest pain  ,palpitations  Pulmonary:denies shortness of breath, cough, wheezing, hemoptysis  Gastrointestinal: denies abdominal pain, constipation, diarrhea,nausea , vomiting, hematochezia  : denies hematuria, dysuria, or incontinence  Neurological: denies weakness, numbness , tingling, dizziness, tremors  MSK: denies muscle pain, difficulty ambulating, swelling, back pain  skin: denies skin rash, itching, burning, or  skin lesions  Psychiatrical: denies mood disturbances, anxierty, feeling depressed, depression , or difficulty sleeping    Objective:  Vitals  T(C): 36.7 (07-26-23 @ 05:18), Max: 36.8 (07-25-23 @ 16:41)  HR: 65 (07-26-23 @ 05:18) (65 - 75)  BP: 118/75 (07-26-23 @ 05:18) (100/60 - 118/75)  RR: 18 (07-26-23 @ 05:18) (17 - 18)  SpO2: 98% (07-26-23 @ 05:18) (96% - 100%)    Physical Exam:  General: comfortable, no acute distress  HEENT: Atraumatic, no LAD, trachea midline, PERRLA  Cardiovascular: normal s1s2, no murmurs, gallops or fricition rubs  Pulmonary: clear to ausculation Bilaterally, no wheezing , rhonchi  Gastrointestinal: soft non tender non distended, no masses felt, no organomegally  Muscloskeletal: no lower extremity edema, intact bilateral lower extremity pulses  Neurological: CN II-12 intact. No focal weakness  Psychiatrical: normal mood, cooperative  SKIN: no rash, lesions or ulcers    Labs:                          14.9   4.82  )-----------( 251      ( 25 Jul 2023 07:00 )             45.1     07-25    139  |  105  |  20  ----------------------------<  132<H>  3.8   |  28  |  1.18    Ca    9.3      25 Jul 2023 07:00  Phos  3.4     07-25  Mg     1.8     07-25              Active Medications  MEDICATIONS  (STANDING):  apixaban 5 milliGRAM(s) Oral two times a day  atorvastatin 10 milliGRAM(s) Oral at bedtime  dextrose 5%. 1000 milliLiter(s) (100 mL/Hr) IV Continuous <Continuous>  dextrose 5%. 1000 milliLiter(s) (50 mL/Hr) IV Continuous <Continuous>  dextrose 50% Injectable 25 Gram(s) IV Push once  dextrose 50% Injectable 25 Gram(s) IV Push once  dextrose 50% Injectable 12.5 Gram(s) IV Push once  folic acid 1 milliGRAM(s) Oral daily  glucagon  Injectable 1 milliGRAM(s) IntraMuscular once  hydrochlorothiazide 25 milliGRAM(s) Oral daily  insulin lispro (ADMELOG) corrective regimen sliding scale   SubCutaneous three times a day before meals  insulin lispro (ADMELOG) corrective regimen sliding scale   SubCutaneous at bedtime  lactated ringers. 1000 milliLiter(s) (100 mL/Hr) IV Continuous <Continuous>  losartan 100 milliGRAM(s) Oral daily  multivitamin 1 Tablet(s) Oral daily  senna 2 Tablet(s) Oral at bedtime  thiamine 100 milliGRAM(s) Oral daily    MEDICATIONS  (PRN):  acetaminophen     Tablet .. 650 milliGRAM(s) Oral every 6 hours PRN Mild Pain (1 - 3), Moderate Pain (4 - 6)  dextrose Oral Gel 15 Gram(s) Oral once PRN Blood Glucose LESS THAN 70 milliGRAM(s)/deciliter  melatonin 3 milliGRAM(s) Oral at bedtime PRN Insomnia

## 2023-07-26 NOTE — PROGRESS NOTE ADULT - ASSESSMENT
81 yo male w/ history of HTN, HLD, A. fib on Eliquis, DM II, Etoh use & dementia admitted to [facility] for mechanical fall.     Mechanical fall  - CT brain- 7/17- no fx, no bleed  - PT recommended Home PT     Hx of etoh abuse  - c/w Thiamine, folic acid, multivitamin  - watch for withdrawal     LOUIS  - resolved  - likely prerenal     DM II  - c/w ISS  - Hgb A1C is 6.8    HTN  - resume losartan & HCTZ    A. fib  - c/w Eliquis     HLD  - c/w Lipitor      Constipation  - c/w Senna    Prophylaxis:  DVT: Eliquis  GI: PO diet    Discharge planning: Patient is NOT SAFE DC HOME: PATIENT REQUIRES LONG TERM CARE

## 2023-07-27 LAB
ANION GAP SERPL CALC-SCNC: 8 MMOL/L — SIGNIFICANT CHANGE UP (ref 5–17)
BASOPHILS # BLD AUTO: 0.07 K/UL — SIGNIFICANT CHANGE UP (ref 0–0.2)
BASOPHILS NFR BLD AUTO: 0.8 % — SIGNIFICANT CHANGE UP (ref 0–2)
BUN SERPL-MCNC: 30 MG/DL — HIGH (ref 7–23)
CALCIUM SERPL-MCNC: 9.7 MG/DL — SIGNIFICANT CHANGE UP (ref 8.5–10.1)
CHLORIDE SERPL-SCNC: 103 MMOL/L — SIGNIFICANT CHANGE UP (ref 96–108)
CO2 SERPL-SCNC: 24 MMOL/L — SIGNIFICANT CHANGE UP (ref 22–31)
CREAT SERPL-MCNC: 1.35 MG/DL — HIGH (ref 0.5–1.3)
EGFR: 52 ML/MIN/1.73M2 — LOW
EOSINOPHIL # BLD AUTO: 0.25 K/UL — SIGNIFICANT CHANGE UP (ref 0–0.5)
EOSINOPHIL NFR BLD AUTO: 3 % — SIGNIFICANT CHANGE UP (ref 0–6)
GLUCOSE BLDC GLUCOMTR-MCNC: 144 MG/DL — HIGH (ref 70–99)
GLUCOSE BLDC GLUCOMTR-MCNC: 178 MG/DL — HIGH (ref 70–99)
GLUCOSE BLDC GLUCOMTR-MCNC: 312 MG/DL — HIGH (ref 70–99)
GLUCOSE SERPL-MCNC: 180 MG/DL — HIGH (ref 70–99)
HCT VFR BLD CALC: 49.9 % — SIGNIFICANT CHANGE UP (ref 39–50)
HGB BLD-MCNC: 16.9 G/DL — SIGNIFICANT CHANGE UP (ref 13–17)
IMM GRANULOCYTES NFR BLD AUTO: 0.2 % — SIGNIFICANT CHANGE UP (ref 0–0.9)
LYMPHOCYTES # BLD AUTO: 3.7 K/UL — HIGH (ref 1–3.3)
LYMPHOCYTES # BLD AUTO: 44.4 % — HIGH (ref 13–44)
MCHC RBC-ENTMCNC: 33.9 G/DL — SIGNIFICANT CHANGE UP (ref 32–36)
MCHC RBC-ENTMCNC: 33.9 PG — SIGNIFICANT CHANGE UP (ref 27–34)
MCV RBC AUTO: 100.2 FL — HIGH (ref 80–100)
MONOCYTES # BLD AUTO: 0.79 K/UL — SIGNIFICANT CHANGE UP (ref 0–0.9)
MONOCYTES NFR BLD AUTO: 9.5 % — SIGNIFICANT CHANGE UP (ref 2–14)
NEUTROPHILS # BLD AUTO: 3.51 K/UL — SIGNIFICANT CHANGE UP (ref 1.8–7.4)
NEUTROPHILS NFR BLD AUTO: 42.1 % — LOW (ref 43–77)
NRBC # BLD: 0 /100 WBCS — SIGNIFICANT CHANGE UP (ref 0–0)
PLATELET # BLD AUTO: 255 K/UL — SIGNIFICANT CHANGE UP (ref 150–400)
POTASSIUM SERPL-MCNC: 3.5 MMOL/L — SIGNIFICANT CHANGE UP (ref 3.5–5.3)
POTASSIUM SERPL-SCNC: 3.5 MMOL/L — SIGNIFICANT CHANGE UP (ref 3.5–5.3)
RBC # BLD: 4.98 M/UL — SIGNIFICANT CHANGE UP (ref 4.2–5.8)
RBC # FLD: 12.6 % — SIGNIFICANT CHANGE UP (ref 10.3–14.5)
SODIUM SERPL-SCNC: 135 MMOL/L — SIGNIFICANT CHANGE UP (ref 135–145)
WBC # BLD: 8.34 K/UL — SIGNIFICANT CHANGE UP (ref 3.8–10.5)
WBC # FLD AUTO: 8.34 K/UL — SIGNIFICANT CHANGE UP (ref 3.8–10.5)

## 2023-07-27 PROCEDURE — 74176 CT ABD & PELVIS W/O CONTRAST: CPT | Mod: 26

## 2023-07-27 PROCEDURE — 99232 SBSQ HOSP IP/OBS MODERATE 35: CPT

## 2023-07-27 RX ORDER — PANTOPRAZOLE SODIUM 20 MG/1
40 TABLET, DELAYED RELEASE ORAL EVERY 12 HOURS
Refills: 0 | Status: DISCONTINUED | OUTPATIENT
Start: 2023-07-27 | End: 2023-07-28

## 2023-07-27 RX ORDER — SUCRALFATE 1 G
1 TABLET ORAL EVERY 6 HOURS
Refills: 0 | Status: DISCONTINUED | OUTPATIENT
Start: 2023-07-27 | End: 2023-07-28

## 2023-07-27 RX ORDER — ONDANSETRON 8 MG/1
4 TABLET, FILM COATED ORAL EVERY 6 HOURS
Refills: 0 | Status: DISCONTINUED | OUTPATIENT
Start: 2023-07-27 | End: 2023-07-28

## 2023-07-27 RX ADMIN — APIXABAN 5 MILLIGRAM(S): 2.5 TABLET, FILM COATED ORAL at 17:59

## 2023-07-27 RX ADMIN — Medication 1 GRAM(S): at 23:14

## 2023-07-27 RX ADMIN — PANTOPRAZOLE SODIUM 40 MILLIGRAM(S): 20 TABLET, DELAYED RELEASE ORAL at 17:59

## 2023-07-27 RX ADMIN — APIXABAN 5 MILLIGRAM(S): 2.5 TABLET, FILM COATED ORAL at 06:02

## 2023-07-27 RX ADMIN — Medication 100 MILLIGRAM(S): at 13:20

## 2023-07-27 RX ADMIN — ATORVASTATIN CALCIUM 10 MILLIGRAM(S): 80 TABLET, FILM COATED ORAL at 22:01

## 2023-07-27 RX ADMIN — Medication 1 TABLET(S): at 13:21

## 2023-07-27 RX ADMIN — ONDANSETRON 4 MILLIGRAM(S): 8 TABLET, FILM COATED ORAL at 18:00

## 2023-07-27 RX ADMIN — Medication 1 GRAM(S): at 17:59

## 2023-07-27 RX ADMIN — Medication 1 MILLIGRAM(S): at 13:20

## 2023-07-27 RX ADMIN — Medication 2: at 22:00

## 2023-07-27 RX ADMIN — LOSARTAN POTASSIUM 100 MILLIGRAM(S): 100 TABLET, FILM COATED ORAL at 06:03

## 2023-07-27 RX ADMIN — Medication 1: at 09:17

## 2023-07-27 NOTE — PROGRESS NOTE ADULT - SUBJECTIVE AND OBJECTIVE BOX
Patient seen and examined  reports vomited once  DOES NOT like cranberry juice  denies fever chills  had BM  Review of Systems:  General:denies fever chills, headache, weakness  HEENT: denies blurry vision,diffculty swallowing, difficulty hearing, tinnitus  Cardiovascular: denies chest pain  ,palpitations  Pulmonary:denies shortness of breath, cough, wheezing, hemoptysis  Gastrointestinal: denies abdominal pain, constipation, diarrhea,nausea , vomiting, hematochezia  : denies hematuria, dysuria, or incontinence  Neurological: denies weakness, numbness , tingling, dizziness, tremors  MSK: denies muscle pain, difficulty ambulating, swelling, back pain  skin: denies skin rash, itching, burning, or  skin lesions  Psychiatrical: denies mood disturbances, anxierty, feeling depressed, depression , or difficulty sleeping    Objective:  Vitals  T(C): 36.3 (07-27-23 @ 11:13), Max: 37.2 (07-26-23 @ 17:39)  HR: 83 (07-27-23 @ 11:13) (66 - 83)  BP: 98/63 (07-27-23 @ 14:06) (95/60 - 123/83)  RR: 18 (07-27-23 @ 05:46) (17 - 18)  SpO2: 98% (07-27-23 @ 11:13) (95% - 99%)    Physical Exam:  General: comfortable, no acute distress  HEENT: Atraumatic, no LAD, trachea midline, PERRLA  Cardiovascular: normal s1s2, no murmurs, gallops or fricition rubs  Pulmonary: clear to ausculation Bilaterally, no wheezing , rhonchi  Gastrointestinal: soft non tender non distended, no masses felt, no organomegally  Muscloskeletal: no lower extremity edema, intact bilateral lower extremity pulses  Neurological: CN II-12 intact. No focal weakness  Psychiatrical: normal mood, cooperative  SKIN: no rash, lesions or ulcers    Labs:                          16.9   8.34  )-----------( 255      ( 27 Jul 2023 07:05 )             49.9     07-27    135  |  103  |  30<H>  ----------------------------<  180<H>  3.5   |  24  |  1.35<H>    Ca    9.7      27 Jul 2023 07:05              Active Medications  MEDICATIONS  (STANDING):  apixaban 5 milliGRAM(s) Oral two times a day  atorvastatin 10 milliGRAM(s) Oral at bedtime  dextrose 5%. 1000 milliLiter(s) (50 mL/Hr) IV Continuous <Continuous>  dextrose 5%. 1000 milliLiter(s) (100 mL/Hr) IV Continuous <Continuous>  dextrose 50% Injectable 25 Gram(s) IV Push once  dextrose 50% Injectable 12.5 Gram(s) IV Push once  dextrose 50% Injectable 25 Gram(s) IV Push once  folic acid 1 milliGRAM(s) Oral daily  glucagon  Injectable 1 milliGRAM(s) IntraMuscular once  hydrochlorothiazide 25 milliGRAM(s) Oral daily  insulin lispro (ADMELOG) corrective regimen sliding scale   SubCutaneous three times a day before meals  insulin lispro (ADMELOG) corrective regimen sliding scale   SubCutaneous at bedtime  lactated ringers. 1000 milliLiter(s) (100 mL/Hr) IV Continuous <Continuous>  losartan 100 milliGRAM(s) Oral daily  multivitamin 1 Tablet(s) Oral daily  pantoprazole    Tablet 40 milliGRAM(s) Oral every 12 hours  senna 2 Tablet(s) Oral at bedtime  sucralfate 1 Gram(s) Oral every 6 hours  thiamine 100 milliGRAM(s) Oral daily    MEDICATIONS  (PRN):  acetaminophen     Tablet .. 650 milliGRAM(s) Oral every 6 hours PRN Mild Pain (1 - 3), Moderate Pain (4 - 6)  dextrose Oral Gel 15 Gram(s) Oral once PRN Blood Glucose LESS THAN 70 milliGRAM(s)/deciliter  melatonin 3 milliGRAM(s) Oral at bedtime PRN Insomnia  ondansetron Injectable 4 milliGRAM(s) IV Push every 6 hours PRN Nausea

## 2023-07-27 NOTE — PROGRESS NOTE ADULT - ASSESSMENT
83 yo male w/ history of HTN, HLD, A. fib on Eliquis, DM II, Etoh use & dementia admitted to [facility] for mechanical fall.     Mechanical fall  - CT brain- 7/17- no fx, no bleed  - PT recommended Home PT     Hx of etoh abuse  - c/w Thiamine, folic acid, multivitamin  - watch for withdrawal     LOUIS  - resolved  - likely prerenal     DM II  - c/w ISS  - Hgb A1C is 6.8    HTN  - resume losartan & HCTZ    A. fib  - c/w Eliquis     HLD  - c/w Lipitor      Constipation  - c/w Senna    Prophylaxis:  DVT: Eliquis  GI: PO diet    Discharge planning: Patient is NOT SAFE DC HOME: PATIENT REQUIRES LONG TERM CARE

## 2023-07-27 NOTE — PROGRESS NOTE ADULT - REASON FOR ADMISSION
LOUIS, generalized weakness

## 2023-07-27 NOTE — PROGRESS NOTE ADULT - PROVIDER SPECIALTY LIST ADULT
Hospitalist
Hospitalist
Internal Medicine
Internal Medicine
Hospitalist
Internal Medicine

## 2023-07-28 ENCOUNTER — TRANSCRIPTION ENCOUNTER (OUTPATIENT)
Age: 83
End: 2023-07-28

## 2023-07-28 VITALS
TEMPERATURE: 98 F | DIASTOLIC BLOOD PRESSURE: 70 MMHG | OXYGEN SATURATION: 97 % | HEART RATE: 83 BPM | RESPIRATION RATE: 16 BRPM | SYSTOLIC BLOOD PRESSURE: 116 MMHG

## 2023-07-28 LAB
GLUCOSE BLDC GLUCOMTR-MCNC: 140 MG/DL — HIGH (ref 70–99)
GLUCOSE BLDC GLUCOMTR-MCNC: 170 MG/DL — HIGH (ref 70–99)

## 2023-07-28 PROCEDURE — 99239 HOSP IP/OBS DSCHRG MGMT >30: CPT

## 2023-07-28 RX ORDER — HYDROCHLOROTHIAZIDE 25 MG
1 TABLET ORAL
Qty: 0 | Refills: 0 | DISCHARGE
Start: 2023-07-28

## 2023-07-28 RX ORDER — THIAMINE MONONITRATE (VIT B1) 100 MG
1 TABLET ORAL
Qty: 0 | Refills: 0 | DISCHARGE
Start: 2023-07-28

## 2023-07-28 RX ORDER — FOLIC ACID 0.8 MG
1 TABLET ORAL
Qty: 0 | Refills: 0 | DISCHARGE
Start: 2023-07-28

## 2023-07-28 RX ORDER — LOSARTAN POTASSIUM 100 MG/1
1 TABLET, FILM COATED ORAL
Qty: 0 | Refills: 0 | DISCHARGE
Start: 2023-07-28

## 2023-07-28 RX ORDER — PANTOPRAZOLE SODIUM 20 MG/1
1 TABLET, DELAYED RELEASE ORAL
Qty: 0 | Refills: 0 | DISCHARGE
Start: 2023-07-28

## 2023-07-28 RX ORDER — LOSARTAN/HYDROCHLOROTHIAZIDE 100MG-25MG
1 TABLET ORAL
Qty: 0 | Refills: 0 | DISCHARGE

## 2023-07-28 RX ORDER — SUCRALFATE 1 G
1 TABLET ORAL
Qty: 0 | Refills: 0 | DISCHARGE
Start: 2023-07-28

## 2023-07-28 RX ADMIN — Medication 1 MILLIGRAM(S): at 12:11

## 2023-07-28 RX ADMIN — PANTOPRAZOLE SODIUM 40 MILLIGRAM(S): 20 TABLET, DELAYED RELEASE ORAL at 05:30

## 2023-07-28 RX ADMIN — Medication 100 MILLIGRAM(S): at 12:11

## 2023-07-28 RX ADMIN — APIXABAN 5 MILLIGRAM(S): 2.5 TABLET, FILM COATED ORAL at 05:30

## 2023-07-28 RX ADMIN — Medication 1 GRAM(S): at 05:29

## 2023-07-28 RX ADMIN — Medication 1 GRAM(S): at 12:10

## 2023-07-28 RX ADMIN — LOSARTAN POTASSIUM 100 MILLIGRAM(S): 100 TABLET, FILM COATED ORAL at 05:30

## 2023-07-28 RX ADMIN — Medication 1: at 12:07

## 2023-07-28 RX ADMIN — Medication 1 TABLET(S): at 12:10

## 2023-07-28 NOTE — DISCHARGE NOTE PROVIDER - NSDCCPCAREPLAN_GEN_ALL_CORE_FT
PRINCIPAL DISCHARGE DIAGNOSIS  Diagnosis: LOUIS (acute kidney injury)  Assessment and Plan of Treatment: 83 yo male w/ history of HTN, HLD, A. fib on Eliquis, DM II, Etoh use & dementia admitted to [facility] for mechanical fall.   Mechanical fall  - CT brain- 7/17- no fx, no bleed  - PT recommended Home PT     Hx of etoh abuse  - c/w Thiamine, folic acid, multivitamin  - watch for withdrawal   LOUIS  - resolved  - likely prerenal   DM II  - c/w ISS  - Hgb A1C is 6.8  HTN  - resume losartan & HCTZ  A. fib  - c/w Eliquis   HLD  - c/w Lipitor    Constipation / reflux  - PlEASE c/w Senna  - patient had one vomting episode 7/27. resolved  CT abdomen with constipation  -PLEASE continue protonix and carafate  Prophylaxis:  DVT: Eliquis  GI: PO diet  Discharge planning: Patient is NOT SAFE DC HOME: PATIENT REQUIRES LONG TERM CARE           SECONDARY DISCHARGE DIAGNOSES  Diagnosis: Acute encephalopathy  Assessment and Plan of Treatment:

## 2023-07-28 NOTE — DISCHARGE NOTE NURSING/CASE MANAGEMENT/SOCIAL WORK - NSDCPEFALRISK_GEN_ALL_CORE
For information on Fall & Injury Prevention, visit: [email or web address] OR  [email or web address] OR  https://www.cdc.gov/steadi/patient.html

## 2023-07-28 NOTE — DISCHARGE NOTE NURSING/CASE MANAGEMENT/SOCIAL WORK - PATIENT PORTAL LINK FT
You can access the FollowMyHealth Patient Portal offered by [facility] by registering at the following website: [email or web address]. By joining [employer]s FollowMyHealth portal, you will also be able to view your health information using other applications (apps) compatible with our system.

## 2023-07-28 NOTE — DISCHARGE NOTE PROVIDER - NSDCMRMEDTOKEN_GEN_ALL_CORE_FT
apixaban 5 mg oral tablet: 1 tab(s) orally 2 times a day  atorvastatin 10 mg oral tablet: 1 tab(s) orally once a day (at bedtime)  folic acid 1 mg oral tablet: 1 tab(s) orally once a day  hydroCHLOROthiazide 25 mg oral tablet: 1 tab(s) orally once a day  losartan 100 mg oral tablet: 1 tab(s) orally once a day  metFORMIN 500 mg oral tablet: 1 tab(s) orally once a day  Multiple Vitamins oral tablet: 1 tab(s) orally once a day  pantoprazole 40 mg oral delayed release tablet: 1 tab(s) orally every 12 hours  senna (sennosides) 8.6 mg oral tablet: 2 tab(s) orally once a day (at bedtime)  sucralfate 1 g oral tablet: 1 tab(s) orally every 6 hours  thiamine 100 mg oral tablet: 1 tab(s) orally once a day

## 2023-07-28 NOTE — DISCHARGE NOTE PROVIDER - HOSPITAL COURSE
83 yo male w/ history of HTN, HLD, A. fib on Eliquis, DM II, Etoh use & dementia admitted to [facility] for mechanical fall.     Mechanical fall  - CT brain- 7/17- no fx, no bleed  - PT recommended Home PT     Hx of etoh abuse  - c/w Thiamine, folic acid, multivitamin  - watch for withdrawal     LOUIS  - resolved  - likely prerenal     DM II  - c/w ISS  - Hgb A1C is 6.8    HTN  - resume losartan & HCTZ    A. fib  - c/w Eliquis     HLD  - c/w Lipitor      Constipation  - c/w Senna    Prophylaxis:  DVT: Eliquis  GI: PO diet    Discharge planning: Patient is NOT SAFE DC HOME: PATIENT REQUIRES LONG TERM CARE

## 2023-08-10 DIAGNOSIS — E86.0 DEHYDRATION: ICD-10-CM

## 2023-08-10 DIAGNOSIS — L89.92 PRESSURE ULCER OF UNSPECIFIED SITE, STAGE 2: ICD-10-CM

## 2023-08-10 DIAGNOSIS — E87.1 HYPO-OSMOLALITY AND HYPONATREMIA: ICD-10-CM

## 2023-08-10 DIAGNOSIS — K59.00 CONSTIPATION, UNSPECIFIED: ICD-10-CM

## 2023-08-10 DIAGNOSIS — F03.90 UNSPECIFIED DEMENTIA, UNSPECIFIED SEVERITY, WITHOUT BEHAVIORAL DISTURBANCE, PSYCHOTIC DISTURBANCE, MOOD DISTURBANCE, AND ANXIETY: ICD-10-CM

## 2023-08-10 DIAGNOSIS — I48.91 UNSPECIFIED ATRIAL FIBRILLATION: ICD-10-CM

## 2023-08-10 DIAGNOSIS — F10.10 ALCOHOL ABUSE, UNCOMPLICATED: ICD-10-CM

## 2023-08-10 DIAGNOSIS — R53.1 WEAKNESS: ICD-10-CM

## 2023-08-10 DIAGNOSIS — E11.9 TYPE 2 DIABETES MELLITUS WITHOUT COMPLICATIONS: ICD-10-CM

## 2023-08-10 DIAGNOSIS — Z79.01 LONG TERM (CURRENT) USE OF ANTICOAGULANTS: ICD-10-CM

## 2023-08-10 DIAGNOSIS — E78.5 HYPERLIPIDEMIA, UNSPECIFIED: ICD-10-CM

## 2023-08-10 DIAGNOSIS — N17.9 ACUTE KIDNEY FAILURE, UNSPECIFIED: ICD-10-CM

## 2023-08-10 DIAGNOSIS — I10 ESSENTIAL (PRIMARY) HYPERTENSION: ICD-10-CM

## 2023-08-17 NOTE — ED ADULT TRIAGE NOTE - BSA (M2)
2.03 H Plasty Text: Given the location of the defect, shape of the defect and the proximity to free margins a H-plasty was deemed most appropriate for repair.  Using a sterile surgical marker, the appropriate advancement arms of the H-plasty were drawn incorporating the defect and placing the expected incisions within the relaxed skin tension lines where possible. The area thus outlined was incised deep to adipose tissue with a #15 scalpel blade. The skin margins were undermined to an appropriate distance in all directions utilizing iris scissors.  The opposing advancement arms were then advanced into place in opposite direction and anchored with interrupted buried subcutaneous sutures.

## 2025-02-27 NOTE — PHYSICAL THERAPY INITIAL EVALUATION ADULT - ORIENTATION, REHAB EVAL
Expected Patient Referral to ED  3:15 PM    Referring Clinic/Provider:  [facility]    Reason for referral/Clinical facts:  History of black out vision, no resolved, no just headache. History of small strokes on MRI, currently on anticoagulation. Eye exam is normal. Coming back to ER for concern for new stroke vs giant cell arteritis.      Recommendations provided:  Send to ED for further evaluation    Caller was informed that this institution does possess the capabilities and/or resources to provide for patient and should be transferred to our facility.    Discussed that if direct admit is sought and any hurdles are encountered, this ED would be happy to see the patient and evaluate.    Informed caller that recommendations provided are recommendations based only on the facts provided and that they responsible to accept or reject the advice, or to seek a formal in person consultation as needed and that this ED will see/treat patient should they arrive.      MASOUD NEELY MD  [facility] EMERGENCY ROOM  [address] [address]  [phone]       Masoud Neely MD  02/27/25 [id number]    
oriented to person, place, time and situation

## 2025-05-27 NOTE — PHYSICAL THERAPY INITIAL EVALUATION ADULT - GAIT DISTANCE, PT EVAL
Hgb dropped to 6.3 on 5/18 and was confirmed by repeat lab  at 6.7 so was transfused with one unit of PRBCs after discussion with Oncology  Spoke with wife over the phone and consent obtained  On 5/19/25, hemoglobin was up to 8.0 = appropriate response to transfusion  Hgb 6.8 with recheck 7.6 = d/w H-O and they wanted him to get a unit of blood leukoreduced and irradiated (d/w Dr Renteria who discussed with Dr Roberson).    Today 5/28/25, hemoglobin was 8.6   150 feet

## (undated) DEVICE — BITE BLOCK ADULT 20 X 27MM (GREEN)

## (undated) DEVICE — KIT ENDO PROCEDURE CUST W/VLV

## (undated) DEVICE — GLV 7.5 PROTEXIS (WHITE)

## (undated) DEVICE — STERIS DEFENDO 3-PIECE KIT (AIR/WATER, SUCTION & BIOPSY VALVES)

## (undated) DEVICE — FORCEP RADIAL JAW 4 W NDL 2.4MM 2.8MM 240CM ORANGE DISP